# Patient Record
Sex: FEMALE | Race: WHITE | Employment: UNEMPLOYED | URBAN - METROPOLITAN AREA
[De-identification: names, ages, dates, MRNs, and addresses within clinical notes are randomized per-mention and may not be internally consistent; named-entity substitution may affect disease eponyms.]

---

## 2018-07-19 ENCOUNTER — HOSPITAL ENCOUNTER (OUTPATIENT)
Dept: MRI IMAGING | Age: 58
Discharge: HOME | End: 2018-07-19
Payer: COMMERCIAL

## 2018-07-19 DIAGNOSIS — M53.3 PAIN IN THE COCCYX: ICD-10-CM

## 2018-07-19 DIAGNOSIS — M54.42 ACUTE BACK PAIN WITH SCIATICA, LEFT: ICD-10-CM

## 2018-07-19 PROCEDURE — 72148 MRI LUMBAR SPINE W/O DYE: CPT

## 2018-07-23 ENCOUNTER — HOSPITAL ENCOUNTER (OUTPATIENT)
Dept: NUCLEAR MEDICINE | Age: 58
Discharge: HOME | End: 2018-07-23
Attending: INTERNAL MEDICINE
Payer: COMMERCIAL

## 2018-07-23 DIAGNOSIS — C79.51 METASTASIS TO BONE (HCC): ICD-10-CM

## 2018-07-23 DIAGNOSIS — Z85.3 HX: BREAST CANCER: ICD-10-CM

## 2018-07-23 PROCEDURE — A9503 TC99M MEDRONATE: HCPCS

## 2018-08-06 ENCOUNTER — APPOINTMENT (OUTPATIENT)
Dept: GENERAL RADIOLOGY | Age: 58
End: 2018-08-06
Attending: EMERGENCY MEDICINE
Payer: COMMERCIAL

## 2018-08-06 ENCOUNTER — ANESTHESIA EVENT (OUTPATIENT)
Dept: CT IMAGING | Age: 58
End: 2018-08-06
Payer: COMMERCIAL

## 2018-08-06 ENCOUNTER — APPOINTMENT (OUTPATIENT)
Dept: CT IMAGING | Age: 58
End: 2018-08-06
Attending: EMERGENCY MEDICINE
Payer: COMMERCIAL

## 2018-08-06 ENCOUNTER — HOSPITAL ENCOUNTER (EMERGENCY)
Age: 58
Discharge: HOME | End: 2018-08-06
Attending: EMERGENCY MEDICINE
Payer: COMMERCIAL

## 2018-08-06 ENCOUNTER — ANESTHESIA (OUTPATIENT)
Dept: CT IMAGING | Age: 58
End: 2018-08-06
Payer: COMMERCIAL

## 2018-08-06 ENCOUNTER — HOSPITAL ENCOUNTER (OUTPATIENT)
Dept: CT IMAGING | Age: 58
Discharge: HOME | End: 2018-08-06
Attending: INTERNAL MEDICINE
Payer: COMMERCIAL

## 2018-08-06 VITALS
RESPIRATION RATE: 11 BRPM | OXYGEN SATURATION: 100 % | HEIGHT: 63 IN | HEART RATE: 65 BPM | DIASTOLIC BLOOD PRESSURE: 69 MMHG | TEMPERATURE: 98.4 F | SYSTOLIC BLOOD PRESSURE: 124 MMHG | WEIGHT: 145 LBS | BODY MASS INDEX: 25.69 KG/M2

## 2018-08-06 VITALS
SYSTOLIC BLOOD PRESSURE: 125 MMHG | WEIGHT: 140 LBS | TEMPERATURE: 98 F | HEART RATE: 74 BPM | RESPIRATION RATE: 16 BRPM | OXYGEN SATURATION: 100 % | BODY MASS INDEX: 24.8 KG/M2 | DIASTOLIC BLOOD PRESSURE: 70 MMHG | HEIGHT: 63 IN

## 2018-08-06 DIAGNOSIS — M79.652 ACUTE THIGH PAIN, LEFT: ICD-10-CM

## 2018-08-06 DIAGNOSIS — R10.2 PELVIC PAIN: Primary | ICD-10-CM

## 2018-08-06 DIAGNOSIS — Z85.3 PERSONAL HISTORY OF MALIGNANT NEOPLASM OF BREAST: ICD-10-CM

## 2018-08-06 LAB
ALBUMIN SERPL-MCNC: 3.7 G/DL (ref 3.5–5)
ALBUMIN/GLOB SERPL: 1.1 {RATIO} (ref 1.1–2.2)
ALP SERPL-CCNC: 106 U/L (ref 45–117)
ALT SERPL-CCNC: 32 U/L (ref 12–78)
ANION GAP SERPL CALC-SCNC: 14 MMOL/L (ref 5–15)
AST SERPL-CCNC: 52 U/L (ref 15–37)
BASOPHILS # BLD: 0.1 K/UL (ref 0–0.1)
BASOPHILS NFR BLD: 1 % (ref 0–1)
BILIRUB SERPL-MCNC: 0.4 MG/DL (ref 0.2–1)
BUN SERPL-MCNC: 18 MG/DL (ref 6–20)
BUN/CREAT SERPL: 18 (ref 12–20)
CALCIUM SERPL-MCNC: 8.7 MG/DL (ref 8.5–10.1)
CHLORIDE SERPL-SCNC: 104 MMOL/L (ref 97–108)
CO2 SERPL-SCNC: 21 MMOL/L (ref 21–32)
CREAT SERPL-MCNC: 0.99 MG/DL (ref 0.55–1.02)
DIFFERENTIAL METHOD BLD: ABNORMAL
EOSINOPHIL # BLD: 0 K/UL (ref 0–0.4)
EOSINOPHIL NFR BLD: 0 % (ref 0–7)
ERYTHROCYTE [DISTWIDTH] IN BLOOD BY AUTOMATED COUNT: 13 % (ref 11.5–14.5)
GLOBULIN SER CALC-MCNC: 3.3 G/DL (ref 2–4)
GLUCOSE SERPL-MCNC: 154 MG/DL (ref 65–100)
HCT VFR BLD AUTO: 40.8 % (ref 35–47)
HGB BLD-MCNC: 13 G/DL (ref 11.5–16)
IMM GRANULOCYTES # BLD: 0.1 K/UL (ref 0–0.04)
IMM GRANULOCYTES NFR BLD AUTO: 1 % (ref 0–0.5)
LYMPHOCYTES # BLD: 1.7 K/UL (ref 0.8–3.5)
LYMPHOCYTES NFR BLD: 14 % (ref 12–49)
MCH RBC QN AUTO: 29.5 PG (ref 26–34)
MCHC RBC AUTO-ENTMCNC: 31.9 G/DL (ref 30–36.5)
MCV RBC AUTO: 92.5 FL (ref 80–99)
MONOCYTES # BLD: 0.8 K/UL (ref 0–1)
MONOCYTES NFR BLD: 7 % (ref 5–13)
NEUTS SEG # BLD: 9.4 K/UL (ref 1.8–8)
NEUTS SEG NFR BLD: 77 % (ref 32–75)
NRBC # BLD: 0 K/UL (ref 0–0.01)
NRBC BLD-RTO: 0 PER 100 WBC
PLATELET # BLD AUTO: 262 K/UL (ref 150–400)
PMV BLD AUTO: 10.1 FL (ref 8.9–12.9)
POTASSIUM SERPL-SCNC: 4 MMOL/L (ref 3.5–5.1)
PROT SERPL-MCNC: 7 G/DL (ref 6.4–8.2)
RBC # BLD AUTO: 4.41 M/UL (ref 3.8–5.2)
RBC MORPH BLD: ABNORMAL
SODIUM SERPL-SCNC: 139 MMOL/L (ref 136–145)
WBC # BLD AUTO: 12.1 K/UL (ref 3.6–11)

## 2018-08-06 PROCEDURE — 74011250636 HC RX REV CODE- 250/636: Performed by: STUDENT IN AN ORGANIZED HEALTH CARE EDUCATION/TRAINING PROGRAM

## 2018-08-06 PROCEDURE — 88311 DECALCIFY TISSUE: CPT | Performed by: INTERNAL MEDICINE

## 2018-08-06 PROCEDURE — 77030003503 HC NDL BIOP TISS BD -B

## 2018-08-06 PROCEDURE — 88342 IMHCHEM/IMCYTCHM 1ST ANTB: CPT | Performed by: INTERNAL MEDICINE

## 2018-08-06 PROCEDURE — 76060000032 HC ANESTHESIA 0.5 TO 1 HR

## 2018-08-06 PROCEDURE — 73502 X-RAY EXAM HIP UNI 2-3 VIEWS: CPT

## 2018-08-06 PROCEDURE — 36415 COLL VENOUS BLD VENIPUNCTURE: CPT | Performed by: EMERGENCY MEDICINE

## 2018-08-06 PROCEDURE — 88173 CYTOPATH EVAL FNA REPORT: CPT | Performed by: INTERNAL MEDICINE

## 2018-08-06 PROCEDURE — 88305 TISSUE EXAM BY PATHOLOGIST: CPT | Performed by: INTERNAL MEDICINE

## 2018-08-06 PROCEDURE — 77030028872 HC BN BIOP NDL ON CNTRL TY TELE -C

## 2018-08-06 PROCEDURE — 72192 CT PELVIS W/O DYE: CPT

## 2018-08-06 PROCEDURE — 96372 THER/PROPH/DIAG INJ SC/IM: CPT

## 2018-08-06 PROCEDURE — 88341 IMHCHEM/IMCYTCHM EA ADD ANTB: CPT | Performed by: INTERNAL MEDICINE

## 2018-08-06 PROCEDURE — 74011250637 HC RX REV CODE- 250/637: Performed by: EMERGENCY MEDICINE

## 2018-08-06 PROCEDURE — 85025 COMPLETE CBC W/AUTO DIFF WBC: CPT | Performed by: EMERGENCY MEDICINE

## 2018-08-06 PROCEDURE — 20220 BONE BIOPSY TROCAR/NDL SUPFC: CPT

## 2018-08-06 PROCEDURE — 74011250636 HC RX REV CODE- 250/636

## 2018-08-06 PROCEDURE — 88360 TUMOR IMMUNOHISTOCHEM/MANUAL: CPT | Performed by: INTERNAL MEDICINE

## 2018-08-06 PROCEDURE — 80053 COMPREHEN METABOLIC PANEL: CPT | Performed by: EMERGENCY MEDICINE

## 2018-08-06 PROCEDURE — 99284 EMERGENCY DEPT VISIT MOD MDM: CPT

## 2018-08-06 PROCEDURE — 88307 TISSUE EXAM BY PATHOLOGIST: CPT | Performed by: INTERNAL MEDICINE

## 2018-08-06 PROCEDURE — 74011250636 HC RX REV CODE- 250/636: Performed by: EMERGENCY MEDICINE

## 2018-08-06 PROCEDURE — 74011000250 HC RX REV CODE- 250

## 2018-08-06 RX ORDER — KETOROLAC TROMETHAMINE 30 MG/ML
30 INJECTION, SOLUTION INTRAMUSCULAR; INTRAVENOUS
Status: COMPLETED | OUTPATIENT
Start: 2018-08-06 | End: 2018-08-06

## 2018-08-06 RX ORDER — CHROMIUM PICOLINATE 200 MCG
TABLET ORAL
COMMUNITY
End: 2019-10-24

## 2018-08-06 RX ORDER — ONDANSETRON 4 MG/1
4 TABLET, ORALLY DISINTEGRATING ORAL
Qty: 10 TAB | Refills: 0 | Status: SHIPPED | OUTPATIENT
Start: 2018-08-06

## 2018-08-06 RX ORDER — HYDROCODONE BITARTRATE AND ACETAMINOPHEN 7.5; 325 MG/1; MG/1
1 TABLET ORAL
Qty: 12 TAB | Refills: 0 | Status: SHIPPED | OUTPATIENT
Start: 2018-08-06 | End: 2019-10-24

## 2018-08-06 RX ORDER — ONDANSETRON 4 MG/1
4 TABLET, ORALLY DISINTEGRATING ORAL
Status: COMPLETED | OUTPATIENT
Start: 2018-08-06 | End: 2018-08-06

## 2018-08-06 RX ORDER — LIDOCAINE HYDROCHLORIDE 10 MG/ML
INJECTION, SOLUTION EPIDURAL; INFILTRATION; INTRACAUDAL; PERINEURAL
Status: COMPLETED
Start: 2018-08-06 | End: 2018-08-06

## 2018-08-06 RX ORDER — SODIUM CHLORIDE 0.9 % (FLUSH) 0.9 %
10 SYRINGE (ML) INJECTION
Status: DISPENSED | OUTPATIENT
Start: 2018-08-06 | End: 2018-08-06

## 2018-08-06 RX ORDER — LIDOCAINE HYDROCHLORIDE 20 MG/ML
INJECTION, SOLUTION EPIDURAL; INFILTRATION; INTRACAUDAL; PERINEURAL AS NEEDED
Status: DISCONTINUED | OUTPATIENT
Start: 2018-08-06 | End: 2018-08-06 | Stop reason: HOSPADM

## 2018-08-06 RX ORDER — KETOROLAC TROMETHAMINE 30 MG/ML
30 INJECTION, SOLUTION INTRAMUSCULAR; INTRAVENOUS AS NEEDED
Status: DISCONTINUED | OUTPATIENT
Start: 2018-08-06 | End: 2018-08-10 | Stop reason: HOSPADM

## 2018-08-06 RX ORDER — LEVOTHYROXINE SODIUM 88 UG/1
88 TABLET ORAL
COMMUNITY
End: 2022-01-01 | Stop reason: ALTCHOICE

## 2018-08-06 RX ORDER — ONDANSETRON 2 MG/ML
INJECTION INTRAMUSCULAR; INTRAVENOUS AS NEEDED
Status: DISCONTINUED | OUTPATIENT
Start: 2018-08-06 | End: 2018-08-06 | Stop reason: HOSPADM

## 2018-08-06 RX ORDER — MIDAZOLAM HYDROCHLORIDE 1 MG/ML
INJECTION, SOLUTION INTRAMUSCULAR; INTRAVENOUS AS NEEDED
Status: DISCONTINUED | OUTPATIENT
Start: 2018-08-06 | End: 2018-08-06 | Stop reason: HOSPADM

## 2018-08-06 RX ORDER — DEXMEDETOMIDINE HYDROCHLORIDE 4 UG/ML
INJECTION, SOLUTION INTRAVENOUS AS NEEDED
Status: DISCONTINUED | OUTPATIENT
Start: 2018-08-06 | End: 2018-08-06 | Stop reason: HOSPADM

## 2018-08-06 RX ORDER — CYCLOBENZAPRINE HCL 10 MG
10 TABLET ORAL
Status: COMPLETED | OUTPATIENT
Start: 2018-08-06 | End: 2018-08-06

## 2018-08-06 RX ORDER — RANITIDINE 150 MG/1
150 TABLET, FILM COATED ORAL DAILY
COMMUNITY
End: 2019-10-24

## 2018-08-06 RX ORDER — KETOROLAC TROMETHAMINE 30 MG/ML
30 INJECTION, SOLUTION INTRAMUSCULAR; INTRAVENOUS
Status: DISCONTINUED | OUTPATIENT
Start: 2018-08-06 | End: 2018-08-06

## 2018-08-06 RX ORDER — HYDROCODONE BITARTRATE AND ACETAMINOPHEN 7.5; 325 MG/1; MG/1
1 TABLET ORAL
Status: COMPLETED | OUTPATIENT
Start: 2018-08-06 | End: 2018-08-06

## 2018-08-06 RX ORDER — SODIUM CHLORIDE 9 MG/ML
INJECTION, SOLUTION INTRAVENOUS
Status: DISCONTINUED | OUTPATIENT
Start: 2018-08-06 | End: 2018-08-06 | Stop reason: HOSPADM

## 2018-08-06 RX ORDER — IBUPROFEN 200 MG
600 TABLET ORAL
COMMUNITY
End: 2019-10-24

## 2018-08-06 RX ORDER — HYDROCHLOROTHIAZIDE 25 MG/1
25 TABLET ORAL DAILY
COMMUNITY
End: 2022-01-01 | Stop reason: ALTCHOICE

## 2018-08-06 RX ORDER — PROPOFOL 10 MG/ML
INJECTION, EMULSION INTRAVENOUS
Status: DISCONTINUED | OUTPATIENT
Start: 2018-08-06 | End: 2018-08-06 | Stop reason: HOSPADM

## 2018-08-06 RX ORDER — CYCLOBENZAPRINE HCL 10 MG
10 TABLET ORAL
Qty: 10 TAB | Refills: 0 | Status: SHIPPED | OUTPATIENT
Start: 2018-08-06 | End: 2019-10-24

## 2018-08-06 RX ORDER — PROPOFOL 10 MG/ML
INJECTION, EMULSION INTRAVENOUS AS NEEDED
Status: DISCONTINUED | OUTPATIENT
Start: 2018-08-06 | End: 2018-08-06 | Stop reason: HOSPADM

## 2018-08-06 RX ORDER — SODIUM CHLORIDE 9 MG/ML
25 INJECTION, SOLUTION INTRAVENOUS ONCE
Status: COMPLETED | OUTPATIENT
Start: 2018-08-06 | End: 2018-08-06

## 2018-08-06 RX ADMIN — ONDANSETRON 4 MG: 2 INJECTION INTRAMUSCULAR; INTRAVENOUS at 12:47

## 2018-08-06 RX ADMIN — PROPOFOL 20 MG: 10 INJECTION, EMULSION INTRAVENOUS at 12:14

## 2018-08-06 RX ADMIN — PROPOFOL 75 MCG/KG/MIN: 10 INJECTION, EMULSION INTRAVENOUS at 12:14

## 2018-08-06 RX ADMIN — HYDROCODONE BITARTRATE AND ACETAMINOPHEN 1 TABLET: 7.5; 325 TABLET ORAL at 21:39

## 2018-08-06 RX ADMIN — ONDANSETRON 4 MG: 4 TABLET, ORALLY DISINTEGRATING ORAL at 21:39

## 2018-08-06 RX ADMIN — KETOROLAC TROMETHAMINE 30 MG: 30 INJECTION, SOLUTION INTRAMUSCULAR at 13:25

## 2018-08-06 RX ADMIN — SODIUM CHLORIDE 25 ML/HR: 900 INJECTION, SOLUTION INTRAVENOUS at 10:56

## 2018-08-06 RX ADMIN — LIDOCAINE HYDROCHLORIDE 50 MG: 20 INJECTION, SOLUTION EPIDURAL; INFILTRATION; INTRACAUDAL; PERINEURAL at 12:14

## 2018-08-06 RX ADMIN — DEXMEDETOMIDINE HYDROCHLORIDE 4 MCG: 4 INJECTION, SOLUTION INTRAVENOUS at 12:25

## 2018-08-06 RX ADMIN — KETOROLAC TROMETHAMINE 30 MG: 30 INJECTION, SOLUTION INTRAMUSCULAR at 19:36

## 2018-08-06 RX ADMIN — PROPOFOL 30 MG: 10 INJECTION, EMULSION INTRAVENOUS at 12:25

## 2018-08-06 RX ADMIN — SODIUM CHLORIDE: 9 INJECTION, SOLUTION INTRAVENOUS at 12:01

## 2018-08-06 RX ADMIN — CYCLOBENZAPRINE HYDROCHLORIDE 10 MG: 10 TABLET, FILM COATED ORAL at 19:39

## 2018-08-06 RX ADMIN — DEXMEDETOMIDINE HYDROCHLORIDE 4 MCG: 4 INJECTION, SOLUTION INTRAVENOUS at 12:29

## 2018-08-06 RX ADMIN — LIDOCAINE HYDROCHLORIDE 10 ML: 10 INJECTION, SOLUTION EPIDURAL; INFILTRATION; INTRACAUDAL; PERINEURAL at 12:33

## 2018-08-06 RX ADMIN — DEXMEDETOMIDINE HYDROCHLORIDE 4 MCG: 4 INJECTION, SOLUTION INTRAVENOUS at 12:33

## 2018-08-06 RX ADMIN — MIDAZOLAM HYDROCHLORIDE 2 MG: 1 INJECTION, SOLUTION INTRAMUSCULAR; INTRAVENOUS at 12:12

## 2018-08-06 RX ADMIN — DEXMEDETOMIDINE HYDROCHLORIDE 4 MCG: 4 INJECTION, SOLUTION INTRAVENOUS at 12:20

## 2018-08-06 NOTE — ED PROVIDER NOTES
HPI Comments: 66 y.o. female with past medical history significant for Breast Cancer and Hypertension who presents via EMS with chief complaint of hip pain. Patient underwent bone biopsy this morning at [facility] IR department, and upon awaking from surgery onset of left hip pain. Patient reports constant 8/10 left hip pain that radiates down to the posterior knee described as \"muscular cramping. \" Patient reports aggravation of left hip pain with positional movement and weight bearing. Per medical records, patient's procedure was CT guided left sacral bx with no complications. Patient denies recent injury to the area or GLF. Patient reports previous bilateral mastectomy \"awhile ago. \" Pt denies fever, chills, cough, congestion, shortness of breath, chest pain, abdominal pain, nausea, vomiting, diarrhea, difficulty with urination or dysuria. There are no other acute medical concerns at this time. PCP: Dinora Romeo NP    Note written by Pam Ceja, as dictated by Fabienne Suggs MD 4:55 PM      The history is provided by the patient and the spouse. Past Medical History:   Diagnosis Date    Cancer ([address]     breast, mets to bone    Hypertension        Past Surgical History:   Procedure Laterality Date    HX MASTECTOMY Bilateral          History reviewed. No pertinent family history. Social History     Social History    Marital status:      Spouse name: N/A    Number of children: N/A    Years of education: N/A     Occupational History    Not on file. Social History Main Topics    Smoking status: Never Smoker    Smokeless tobacco: Never Used    Alcohol use Yes      Comment: ocasionally    Drug use: Not on file    Sexual activity: Not on file     Other Topics Concern    Not on file     Social History Narrative         ALLERGIES: Codeine and Erythromycin    Review of Systems   Constitutional: Negative for appetite change, chills and fever.    HENT: Negative for congestion, rhinorrhea, sore throat and trouble swallowing. Eyes: Negative for photophobia. Respiratory: Negative for cough and shortness of breath. Cardiovascular: Negative for chest pain and palpitations. Gastrointestinal: Negative for abdominal pain, diarrhea, nausea and vomiting. Genitourinary: Negative for difficulty urinating, dysuria, frequency and hematuria. Musculoskeletal: Negative for arthralgias. Hip pain   Neurological: Negative for dizziness, syncope and weakness. Psychiatric/Behavioral: Negative for behavioral problems. The patient is not nervous/anxious. All other systems reviewed and are negative. Vitals:    08/06/18 1632   BP: 130/72   Pulse: 81   Resp: 16   Temp: 98.2 °F (36.8 °C)   SpO2: 100%   Weight: 63.5 kg (140 lb)   Height: 5' 3\" (1.6 m)            Physical Exam   Constitutional: She appears well-developed and well-nourished. Laying in right lateral decubitus fetal position   HENT:   Head: Normocephalic and atraumatic. Mouth/Throat: Oropharynx is clear and moist.   Eyes: EOM are normal. Pupils are equal, round, and reactive to light. Neck: Normal range of motion. Neck supple. Cardiovascular: Normal rate, regular rhythm, normal heart sounds and intact distal pulses. Exam reveals no gallop and no friction rub. No murmur heard. Pulmonary/Chest: Effort normal. No respiratory distress. She has no wheezes. She has no rales. Abdominal: Soft. There is no tenderness. There is no rebound. Musculoskeletal: Normal range of motion. She exhibits no tenderness. Neurological: She is alert. No cranial nerve deficit. Motor; symmetric   Skin: No erythema. Bandaid over left posterior pelvis adjacent to sacrum. Psychiatric: She has a normal mood and affect. Her behavior is normal.   Nursing note and vitals reviewed.   Note written by Pam Gordon, as dictated by Sarah Juares MD 5:07 PM      [facility]      ED Course       Procedures  PROGRESS NOTE:  5:29 PM Patient had Toradol and Ibuprofen which provided slight relief; however, patient does not like to take Tylenol because it \"does not work\" and does not like taking opiates because it makes her vomit. Patient does not want pain medication at this time. CONSULT NOTE:  8:26 PM Alexey Smith MD spoke with Dr. Andrey Yadav for Interventional Radiology. Discussed available diagnostic tests and clinical findings. Dr. Kingsley Kamara recommends to get CT and admit for possible radiation treatment and OT/PT. PROGRESS NOTE:  8:29 PM Provider with patient at bedside reviewing care plan with patient. Patient expressed understanding and agrees with care plan.

## 2018-08-06 NOTE — ED TRIAGE NOTES
Triage note: EMS states pt had a bone biopsy this morning and taken from the left hip/pevis region and this afternoon she got up from the couch to walk and could not walk due to the pain. Rates pain a 7/10. States she cannot put any pressure on the left leg.

## 2018-08-06 NOTE — ED NOTES
Bedside and Verbal shift change report given to [facility] (oncoming nurse) by Mango Edwards (offgoing nurse). Report included the following information SBAR, ED Summary, Intake/Output, MAR, Recent Results.

## 2018-08-06 NOTE — DISCHARGE INSTRUCTIONS
BONE  BIOPSY/ASPIRATION DISCHARGE INSTRUCTIONS  A bone marrow aspiration is a procedure that takes out a small amount of bone marrow fluid through a needle. A bone marrow biopsy uses a needle to take out a small amount of bone with the marrow inside it. These samples are then checked under a microscope. The hip bone is the most commonly used area for these procedures. Home Care Instructions: You may resume your regular diet and medication regimen. Do not drink alcohol, drive, or make any important legal decisions in the next 24 hours. Do not lift anything heavier than a gallon of milk until the soreness goes away. You may use over the counter acetaminophen or ibuprofen for the soreness. You may apply an ice pack to the affected area for 20-30 minutes at time for the first 24 hours. After that, you may apply a heat pack. You will have a bandaid over the area where the doctor put the needle. Keep this area clean and dry for the next 24 hours. Change the bandaid daily, or if it becomes wet, until the area has healed. Call if you have any bleeding other than a small spot on your bandaid. Call if you have any signs or symptoms of infection: fever, redness, or drainage from the puncture site or fever. Should you experience any significant changes, please call [phone] between the hours of 7:30 am and 10 pm or [phone] after hours.  After hours, ask the  to page the [address] [place] [address] Technologist, and describe the problem to the technologist.

## 2018-08-06 NOTE — ED NOTES
Attempted to ambulate patient with walker. Pt able to take 2-3 steps before yelling at this RN, \"It hurts, I can't do it. \" Pt assisted back into bed with 2 person assist. Pt agreeable to try pain medication. Dr. Karyle Hailey notified. 7:25 PM  Dr. Karyle Hailey at bedside.

## 2018-08-06 NOTE — ANESTHESIA PREPROCEDURE EVALUATION
Anesthetic History   No history of anesthetic complications            Review of Systems / Medical History  Patient summary reviewed, nursing notes reviewed and pertinent labs reviewed    Pulmonary  Within defined limits                 Neuro/Psych   Within defined limits           Cardiovascular  Within defined limits                     GI/Hepatic/Renal  Within defined limits              Endo/Other        Cancer     Other Findings            Physical Exam    Airway  Mallampati: II  TM Distance: > 6 cm  Neck ROM: normal range of motion   Mouth opening: Normal     Cardiovascular  Regular rate and rhythm,  S1 and S2 normal,  no murmur, click, rub, or gallop             Dental  No notable dental hx       Pulmonary  Breath sounds clear to auscultation               Abdominal  GI exam deferred       Other Findings            Anesthetic Plan    ASA: 2  Anesthesia type: MAC          Induction: Intravenous  Anesthetic plan and risks discussed with: Patient

## 2018-08-06 NOTE — ROUTINE PROCESS
Pt. Discharged to home and transported to d/c lot via w/c. Pt. And  verbalized understanding of d/c instructions.

## 2018-08-06 NOTE — PROGRESS NOTES
Received into main CT for CT guided left sacral bx. Anesthesia to assist with sedation and vital sign management d.t.  Poor reaction of nausea and vomiting \" for several days\" in the past

## 2018-08-06 NOTE — ANESTHESIA POSTPROCEDURE EVALUATION
Post-Anesthesia Evaluation and Assessment    Patient: Joel Wynn MRN: [id number]  SSN: [id number]    YOB: 1960  Age: 66 y.o. Sex: female       Cardiovascular Function/Vital Signs  Visit Vitals    /57 (BP 1 Location: Right arm, BP Patient Position: At rest)    Pulse (!) 58    Temp 36.9 °C (98.4 °F)    Resp 11    Ht 5' 3\" (1.6 m)    Wt 65.8 kg (145 lb)    SpO2 100%    BMI 25.69 kg/m2       Patient is status post MAC anesthesia for * No procedures listed *. Nausea/Vomiting: None    Postoperative hydration reviewed and adequate. Pain:  Pain Scale 1: Numeric (0 - 10) (08/06/18 1011)  Pain Intensity 1: 7 (08/06/18 1345)   Managed    Neurological Status: At baseline    Mental Status and Level of Consciousness: Arousable    Pulmonary Status:   O2 Device: Room air (08/06/18 1345)   Adequate oxygenation and airway patent    Complications related to anesthesia: None    Post-anesthesia assessment completed.  No concerns    Signed By: Halle Manley MD     August 6, 2018

## 2018-09-20 ENCOUNTER — HOSPITAL ENCOUNTER (OUTPATIENT)
Dept: NON INVASIVE DIAGNOSTICS | Age: 58
Discharge: HOME | End: 2018-09-20
Payer: COMMERCIAL

## 2018-09-20 DIAGNOSIS — Z85.3 HX: BREAST CANCER: ICD-10-CM

## 2018-09-20 PROCEDURE — 93005 ELECTROCARDIOGRAM TRACING: CPT

## 2018-09-21 LAB
ATRIAL RATE: 84 BPM
CALCULATED P AXIS, ECG09: 66 DEGREES
CALCULATED R AXIS, ECG10: 18 DEGREES
CALCULATED T AXIS, ECG11: 38 DEGREES
DIAGNOSIS, 93000: NORMAL
P-R INTERVAL, ECG05: 206 MS
Q-T INTERVAL, ECG07: 370 MS
QRS DURATION, ECG06: 82 MS
QTC CALCULATION (BEZET), ECG08: 437 MS
VENTRICULAR RATE, ECG03: 84 BPM

## 2019-05-22 ENCOUNTER — HOSPITAL ENCOUNTER (OUTPATIENT)
Dept: MRI IMAGING | Age: 59
Discharge: HOME | End: 2019-05-22
Attending: INTERNAL MEDICINE
Payer: COMMERCIAL

## 2019-05-22 VITALS — BODY MASS INDEX: 26.57 KG/M2 | WEIGHT: 150 LBS

## 2019-05-22 DIAGNOSIS — C50.612 MALIGNANT NEOPLASM OF AXILLARY TAIL OF LEFT FEMALE BREAST (HCC): ICD-10-CM

## 2019-05-22 DIAGNOSIS — G44.209 TENSION HEADACHE: ICD-10-CM

## 2019-05-22 PROCEDURE — 74011250636 HC RX REV CODE- 250/636: Performed by: INTERNAL MEDICINE

## 2019-05-22 PROCEDURE — A9575 INJ GADOTERATE MEGLUMI 0.1ML: HCPCS | Performed by: INTERNAL MEDICINE

## 2019-05-22 PROCEDURE — 70553 MRI BRAIN STEM W/O & W/DYE: CPT

## 2019-05-22 RX ORDER — GADOTERATE MEGLUMINE 376.9 MG/ML
13 INJECTION INTRAVENOUS
Status: COMPLETED | OUTPATIENT
Start: 2019-05-22 | End: 2019-05-22

## 2019-05-22 RX ADMIN — GADOTERATE MEGLUMINE 13 ML: 376.9 INJECTION INTRAVENOUS at 10:14

## 2019-10-24 ENCOUNTER — APPOINTMENT (OUTPATIENT)
Dept: CT IMAGING | Age: 59
DRG: 392 | End: 2019-10-24
Attending: EMERGENCY MEDICINE
Payer: COMMERCIAL

## 2019-10-24 ENCOUNTER — HOSPITAL ENCOUNTER (INPATIENT)
Age: 59
LOS: 4 days | Discharge: HOME | DRG: 392 | End: 2019-10-28
Attending: EMERGENCY MEDICINE | Admitting: INTERNAL MEDICINE
Payer: COMMERCIAL

## 2019-10-24 DIAGNOSIS — E86.0 DEHYDRATION: ICD-10-CM

## 2019-10-24 DIAGNOSIS — E87.6 HYPOKALEMIA: ICD-10-CM

## 2019-10-24 DIAGNOSIS — K52.9 NONINFECTIOUS GASTROENTERITIS, UNSPECIFIED TYPE: ICD-10-CM

## 2019-10-24 DIAGNOSIS — R11.2 NAUSEA AND VOMITING, INTRACTABILITY OF VOMITING NOT SPECIFIED, UNSPECIFIED VOMITING TYPE: ICD-10-CM

## 2019-10-24 DIAGNOSIS — R19.7 BLOODY DIARRHEA: Primary | ICD-10-CM

## 2019-10-24 LAB
ALBUMIN SERPL-MCNC: 4.2 G/DL (ref 3.5–5)
ALBUMIN/GLOB SERPL: 1.2 {RATIO} (ref 1.1–2.2)
ALP SERPL-CCNC: 65 U/L (ref 45–117)
ALT SERPL-CCNC: 30 U/L (ref 12–78)
ANION GAP SERPL CALC-SCNC: 12 MMOL/L (ref 5–15)
APPEARANCE UR: CLEAR
AST SERPL-CCNC: 22 U/L (ref 15–37)
BACTERIA URNS QL MICRO: NEGATIVE /HPF
BASOPHILS # BLD: 0.1 K/UL (ref 0–0.1)
BASOPHILS NFR BLD: 1 % (ref 0–1)
BILIRUB SERPL-MCNC: 0.5 MG/DL (ref 0.2–1)
BILIRUB UR QL: NEGATIVE
BUN SERPL-MCNC: 19 MG/DL (ref 6–20)
BUN/CREAT SERPL: 16 (ref 12–20)
CALCIUM SERPL-MCNC: 9.7 MG/DL (ref 8.5–10.1)
CHLORIDE SERPL-SCNC: 94 MMOL/L (ref 97–108)
CO2 SERPL-SCNC: 28 MMOL/L (ref 21–32)
COLOR UR: ABNORMAL
CREAT SERPL-MCNC: 1.21 MG/DL (ref 0.55–1.02)
DIFFERENTIAL METHOD BLD: ABNORMAL
EOSINOPHIL # BLD: 0 K/UL (ref 0–0.4)
EOSINOPHIL NFR BLD: 0 % (ref 0–7)
EPITH CASTS URNS QL MICRO: ABNORMAL /LPF
ERYTHROCYTE [DISTWIDTH] IN BLOOD BY AUTOMATED COUNT: 14.3 % (ref 11.5–14.5)
GLOBULIN SER CALC-MCNC: 3.5 G/DL (ref 2–4)
GLUCOSE SERPL-MCNC: 182 MG/DL (ref 65–100)
GLUCOSE UR STRIP.AUTO-MCNC: NEGATIVE MG/DL
HCT VFR BLD AUTO: 43.1 % (ref 35–47)
HEMOCCULT STL QL: POSITIVE
HGB BLD-MCNC: 14 G/DL (ref 11.5–16)
HGB UR QL STRIP: NEGATIVE
IMM GRANULOCYTES # BLD AUTO: 0.1 K/UL (ref 0–0.04)
IMM GRANULOCYTES NFR BLD AUTO: 1 % (ref 0–0.5)
KETONES UR QL STRIP.AUTO: >80 MG/DL
LEUKOCYTE ESTERASE UR QL STRIP.AUTO: NEGATIVE
LIPASE SERPL-CCNC: 132 U/L (ref 73–393)
LYMPHOCYTES # BLD: 1.6 K/UL (ref 0.8–3.5)
LYMPHOCYTES NFR BLD: 8 % (ref 12–49)
MAGNESIUM SERPL-MCNC: 2.1 MG/DL (ref 1.6–2.4)
MCH RBC QN AUTO: 30.5 PG (ref 26–34)
MCHC RBC AUTO-ENTMCNC: 32.5 G/DL (ref 30–36.5)
MCV RBC AUTO: 93.9 FL (ref 80–99)
MONOCYTES # BLD: 0.6 K/UL (ref 0–1)
MONOCYTES NFR BLD: 3 % (ref 5–13)
NEUTS SEG # BLD: 16.1 K/UL (ref 1.8–8)
NEUTS SEG NFR BLD: 87 % (ref 32–75)
NITRITE UR QL STRIP.AUTO: NEGATIVE
NRBC # BLD: 0 K/UL (ref 0–0.01)
NRBC BLD-RTO: 0 PER 100 WBC
PH UR STRIP: 5 [PH] (ref 5–8)
PLATELET # BLD AUTO: 272 K/UL (ref 150–400)
PMV BLD AUTO: 9.4 FL (ref 8.9–12.9)
POTASSIUM SERPL-SCNC: 2.5 MMOL/L (ref 3.5–5.1)
PROT SERPL-MCNC: 7.7 G/DL (ref 6.4–8.2)
PROT UR STRIP-MCNC: NEGATIVE MG/DL
RBC # BLD AUTO: 4.59 M/UL (ref 3.8–5.2)
RBC #/AREA URNS HPF: ABNORMAL /HPF (ref 0–5)
SODIUM SERPL-SCNC: 134 MMOL/L (ref 136–145)
SP GR UR REFRACTOMETRY: 1.02 (ref 1–1.03)
UA: UC IF INDICATED,UAUC: ABNORMAL
UROBILINOGEN UR QL STRIP.AUTO: 0.2 EU/DL (ref 0.2–1)
WBC # BLD AUTO: 18.5 K/UL (ref 3.6–11)
WBC URNS QL MICRO: ABNORMAL /HPF (ref 0–4)
YEAST BUDDING URNS QL: PRESENT

## 2019-10-24 PROCEDURE — 74177 CT ABD & PELVIS W/CONTRAST: CPT

## 2019-10-24 PROCEDURE — 94640 AIRWAY INHALATION TREATMENT: CPT

## 2019-10-24 PROCEDURE — 83735 ASSAY OF MAGNESIUM: CPT

## 2019-10-24 PROCEDURE — 74011000258 HC RX REV CODE- 258: Performed by: EMERGENCY MEDICINE

## 2019-10-24 PROCEDURE — 99285 EMERGENCY DEPT VISIT HI MDM: CPT

## 2019-10-24 PROCEDURE — 93005 ELECTROCARDIOGRAM TRACING: CPT

## 2019-10-24 PROCEDURE — 74011000250 HC RX REV CODE- 250: Performed by: EMERGENCY MEDICINE

## 2019-10-24 PROCEDURE — 74011250636 HC RX REV CODE- 250/636: Performed by: EMERGENCY MEDICINE

## 2019-10-24 PROCEDURE — 81001 URINALYSIS AUTO W/SCOPE: CPT

## 2019-10-24 PROCEDURE — 82272 OCCULT BLD FECES 1-3 TESTS: CPT

## 2019-10-24 PROCEDURE — 74011636320 HC RX REV CODE- 636/320: Performed by: EMERGENCY MEDICINE

## 2019-10-24 PROCEDURE — 83690 ASSAY OF LIPASE: CPT

## 2019-10-24 PROCEDURE — 65660000000 HC RM CCU STEPDOWN

## 2019-10-24 PROCEDURE — 96375 TX/PRO/DX INJ NEW DRUG ADDON: CPT

## 2019-10-24 PROCEDURE — 80053 COMPREHEN METABOLIC PANEL: CPT

## 2019-10-24 PROCEDURE — 36415 COLL VENOUS BLD VENIPUNCTURE: CPT

## 2019-10-24 PROCEDURE — 96361 HYDRATE IV INFUSION ADD-ON: CPT

## 2019-10-24 PROCEDURE — 85025 COMPLETE CBC W/AUTO DIFF WBC: CPT

## 2019-10-24 PROCEDURE — 96365 THER/PROPH/DIAG IV INF INIT: CPT

## 2019-10-24 RX ORDER — POTASSIUM CHLORIDE 750 MG/1
30 TABLET, FILM COATED, EXTENDED RELEASE ORAL
Status: DISCONTINUED | OUTPATIENT
Start: 2019-10-24 | End: 2019-10-24

## 2019-10-24 RX ORDER — POTASSIUM CHLORIDE 14.9 MG/ML
10 INJECTION INTRAVENOUS
Status: COMPLETED | OUTPATIENT
Start: 2019-10-24 | End: 2019-10-25

## 2019-10-24 RX ORDER — PROCHLORPERAZINE MALEATE 5 MG
5 TABLET ORAL
Status: ON HOLD | COMMUNITY
End: 2019-10-28 | Stop reason: SDUPTHER

## 2019-10-24 RX ORDER — FAMOTIDINE 10 MG/1
20 TABLET ORAL DAILY
COMMUNITY
Start: 2022-01-01 | End: 2022-01-01 | Stop reason: ALTCHOICE

## 2019-10-24 RX ORDER — SODIUM CHLORIDE, SODIUM LACTATE, POTASSIUM CHLORIDE, CALCIUM CHLORIDE 600; 310; 30; 20 MG/100ML; MG/100ML; MG/100ML; MG/100ML
125 INJECTION, SOLUTION INTRAVENOUS CONTINUOUS
Status: DISCONTINUED | OUTPATIENT
Start: 2019-10-24 | End: 2019-10-25

## 2019-10-24 RX ORDER — SODIUM CHLORIDE 9 MG/ML
50 INJECTION, SOLUTION INTRAVENOUS
Status: COMPLETED | OUTPATIENT
Start: 2019-10-24 | End: 2019-10-25

## 2019-10-24 RX ORDER — LORATADINE 10 MG/1
10 TABLET ORAL DAILY
COMMUNITY
End: 2022-01-01 | Stop reason: ALTCHOICE

## 2019-10-24 RX ORDER — NALOXONE HYDROCHLORIDE 0.4 MG/ML
0.4 INJECTION, SOLUTION INTRAMUSCULAR; INTRAVENOUS; SUBCUTANEOUS AS NEEDED
Status: DISCONTINUED | OUTPATIENT
Start: 2019-10-24 | End: 2019-10-28 | Stop reason: HOSPADM

## 2019-10-24 RX ORDER — POTASSIUM CHLORIDE 14.9 MG/ML
10 INJECTION INTRAVENOUS
Status: COMPLETED | OUTPATIENT
Start: 2019-10-24 | End: 2019-10-24

## 2019-10-24 RX ORDER — ACETAMINOPHEN 325 MG/1
650 TABLET ORAL
Status: DISCONTINUED | OUTPATIENT
Start: 2019-10-24 | End: 2019-10-28 | Stop reason: HOSPADM

## 2019-10-24 RX ORDER — SODIUM CHLORIDE 9 MG/ML
10 INJECTION INTRAMUSCULAR; INTRAVENOUS; SUBCUTANEOUS ONCE
Status: COMPLETED | OUTPATIENT
Start: 2019-10-24 | End: 2019-10-24

## 2019-10-24 RX ORDER — SODIUM CHLORIDE 0.9 % (FLUSH) 0.9 %
5-40 SYRINGE (ML) INJECTION AS NEEDED
Status: DISCONTINUED | OUTPATIENT
Start: 2019-10-24 | End: 2019-10-28 | Stop reason: HOSPADM

## 2019-10-24 RX ORDER — METFORMIN HYDROCHLORIDE 500 MG/1
500 TABLET ORAL DAILY
COMMUNITY
End: 2022-01-01 | Stop reason: ALTCHOICE

## 2019-10-24 RX ORDER — SODIUM CHLORIDE 0.9 % (FLUSH) 0.9 %
5-40 SYRINGE (ML) INJECTION EVERY 8 HOURS
Status: DISCONTINUED | OUTPATIENT
Start: 2019-10-24 | End: 2019-10-28 | Stop reason: HOSPADM

## 2019-10-24 RX ORDER — ONDANSETRON 2 MG/ML
4 INJECTION INTRAMUSCULAR; INTRAVENOUS
Status: COMPLETED | OUTPATIENT
Start: 2019-10-24 | End: 2019-10-24

## 2019-10-24 RX ORDER — MORPHINE SULFATE 2 MG/ML
2 INJECTION, SOLUTION INTRAMUSCULAR; INTRAVENOUS
Status: DISCONTINUED | OUTPATIENT
Start: 2019-10-24 | End: 2019-10-28 | Stop reason: HOSPADM

## 2019-10-24 RX ADMIN — SODIUM CHLORIDE 10 MG: 9 INJECTION INTRAMUSCULAR; INTRAVENOUS; SUBCUTANEOUS at 22:16

## 2019-10-24 RX ADMIN — SODIUM CHLORIDE 3.38 G: 900 INJECTION, SOLUTION INTRAVENOUS at 23:14

## 2019-10-24 RX ADMIN — SODIUM CHLORIDE 10 ML: 9 INJECTION INTRAMUSCULAR; INTRAVENOUS; SUBCUTANEOUS at 22:08

## 2019-10-24 RX ADMIN — IOPAMIDOL 100 ML: 755 INJECTION, SOLUTION INTRAVENOUS at 22:08

## 2019-10-24 RX ADMIN — SODIUM CHLORIDE 1000 ML: 900 INJECTION, SOLUTION INTRAVENOUS at 22:16

## 2019-10-24 RX ADMIN — SODIUM CHLORIDE 50 ML/HR: 900 INJECTION, SOLUTION INTRAVENOUS at 22:08

## 2019-10-24 RX ADMIN — ONDANSETRON 4 MG: 2 INJECTION INTRAMUSCULAR; INTRAVENOUS at 21:07

## 2019-10-24 RX ADMIN — POTASSIUM CHLORIDE 10 MEQ: 200 INJECTION, SOLUTION INTRAVENOUS at 22:23

## 2019-10-24 RX ADMIN — SODIUM CHLORIDE 1000 ML: 900 INJECTION, SOLUTION INTRAVENOUS at 21:07

## 2019-10-24 RX ADMIN — POTASSIUM CHLORIDE 10 MEQ: 200 INJECTION, SOLUTION INTRAVENOUS at 23:38

## 2019-10-25 ENCOUNTER — APPOINTMENT (OUTPATIENT)
Dept: GENERAL RADIOLOGY | Age: 59
DRG: 392 | End: 2019-10-25
Attending: INTERNAL MEDICINE
Payer: COMMERCIAL

## 2019-10-25 LAB
ALBUMIN SERPL-MCNC: 3.7 G/DL (ref 3.5–5)
ALBUMIN/GLOB SERPL: 1.1 {RATIO} (ref 1.1–2.2)
ALP SERPL-CCNC: 56 U/L (ref 45–117)
ALT SERPL-CCNC: 26 U/L (ref 12–78)
ANION GAP SERPL CALC-SCNC: 9 MMOL/L (ref 5–15)
APPEARANCE UR: CLEAR
AST SERPL-CCNC: 17 U/L (ref 15–37)
ATRIAL RATE: 101 BPM
ATRIAL RATE: 94 BPM
BACTERIA URNS QL MICRO: NEGATIVE /HPF
BASOPHILS # BLD: 0 K/UL (ref 0–0.1)
BASOPHILS NFR BLD: 0 % (ref 0–1)
BILIRUB SERPL-MCNC: 0.6 MG/DL (ref 0.2–1)
BILIRUB UR QL: NEGATIVE
BUN SERPL-MCNC: 10 MG/DL (ref 6–20)
BUN/CREAT SERPL: 10 (ref 12–20)
CALCIUM SERPL-MCNC: 8.7 MG/DL (ref 8.5–10.1)
CALCULATED P AXIS, ECG09: 60 DEGREES
CALCULATED P AXIS, ECG09: 67 DEGREES
CALCULATED R AXIS, ECG10: 1 DEGREES
CALCULATED R AXIS, ECG10: 21 DEGREES
CALCULATED T AXIS, ECG11: 24 DEGREES
CALCULATED T AXIS, ECG11: 48 DEGREES
CHLORIDE SERPL-SCNC: 99 MMOL/L (ref 97–108)
CHOLEST SERPL-MCNC: 259 MG/DL
CO2 SERPL-SCNC: 28 MMOL/L (ref 21–32)
COLOR UR: ABNORMAL
CREAT SERPL-MCNC: 0.97 MG/DL (ref 0.55–1.02)
DIAGNOSIS, 93000: NORMAL
DIAGNOSIS, 93000: NORMAL
DIFFERENTIAL METHOD BLD: ABNORMAL
EOSINOPHIL # BLD: 0 K/UL (ref 0–0.4)
EOSINOPHIL NFR BLD: 0 % (ref 0–7)
EPITH CASTS URNS QL MICRO: ABNORMAL /LPF
ERYTHROCYTE [DISTWIDTH] IN BLOOD BY AUTOMATED COUNT: 14.3 % (ref 11.5–14.5)
EST. AVERAGE GLUCOSE BLD GHB EST-MCNC: 171 MG/DL
GLOBULIN SER CALC-MCNC: 3.4 G/DL (ref 2–4)
GLUCOSE BLD STRIP.AUTO-MCNC: 113 MG/DL (ref 65–100)
GLUCOSE BLD STRIP.AUTO-MCNC: 134 MG/DL (ref 65–100)
GLUCOSE BLD STRIP.AUTO-MCNC: 146 MG/DL (ref 65–100)
GLUCOSE BLD STRIP.AUTO-MCNC: 155 MG/DL (ref 65–100)
GLUCOSE SERPL-MCNC: 154 MG/DL (ref 65–100)
GLUCOSE UR STRIP.AUTO-MCNC: NEGATIVE MG/DL
HBA1C MFR BLD: 7.6 % (ref 4.2–6.3)
HCT VFR BLD AUTO: 41.9 % (ref 35–47)
HDLC SERPL-MCNC: 99 MG/DL
HDLC SERPL: 2.6 {RATIO} (ref 0–5)
HGB BLD-MCNC: 13.3 G/DL (ref 11.5–16)
HGB UR QL STRIP: ABNORMAL
HYALINE CASTS URNS QL MICRO: ABNORMAL /LPF (ref 0–5)
IMM GRANULOCYTES # BLD AUTO: 0.2 K/UL (ref 0–0.04)
IMM GRANULOCYTES NFR BLD AUTO: 1 % (ref 0–0.5)
KETONES UR QL STRIP.AUTO: 15 MG/DL
LDLC SERPL CALC-MCNC: 145.6 MG/DL (ref 0–100)
LEUKOCYTE ESTERASE UR QL STRIP.AUTO: NEGATIVE
LIPASE SERPL-CCNC: 99 U/L (ref 73–393)
LIPID PROFILE,FLP: ABNORMAL
LYMPHOCYTES # BLD: 0.8 K/UL (ref 0.8–3.5)
LYMPHOCYTES NFR BLD: 5 % (ref 12–49)
MAGNESIUM SERPL-MCNC: 2 MG/DL (ref 1.6–2.4)
MCH RBC QN AUTO: 30.4 PG (ref 26–34)
MCHC RBC AUTO-ENTMCNC: 31.7 G/DL (ref 30–36.5)
MCV RBC AUTO: 95.9 FL (ref 80–99)
MONOCYTES # BLD: 0.7 K/UL (ref 0–1)
MONOCYTES NFR BLD: 4 % (ref 5–13)
NEUTS SEG # BLD: 15.1 K/UL (ref 1.8–8)
NEUTS SEG NFR BLD: 90 % (ref 32–75)
NITRITE UR QL STRIP.AUTO: NEGATIVE
NRBC # BLD: 0 K/UL (ref 0–0.01)
NRBC BLD-RTO: 0 PER 100 WBC
P-R INTERVAL, ECG05: 174 MS
P-R INTERVAL, ECG05: 200 MS
PH UR STRIP: 5 [PH] (ref 5–8)
PHOSPHATE SERPL-MCNC: 3.2 MG/DL (ref 2.6–4.7)
PLATELET # BLD AUTO: 269 K/UL (ref 150–400)
PMV BLD AUTO: 10.1 FL (ref 8.9–12.9)
POTASSIUM SERPL-SCNC: 2.8 MMOL/L (ref 3.5–5.1)
PROT SERPL-MCNC: 7.1 G/DL (ref 6.4–8.2)
PROT UR STRIP-MCNC: NEGATIVE MG/DL
Q-T INTERVAL, ECG07: 364 MS
Q-T INTERVAL, ECG07: 376 MS
QRS DURATION, ECG06: 80 MS
QRS DURATION, ECG06: 84 MS
QTC CALCULATION (BEZET), ECG08: 455 MS
QTC CALCULATION (BEZET), ECG08: 487 MS
RBC # BLD AUTO: 4.37 M/UL (ref 3.8–5.2)
RBC #/AREA URNS HPF: ABNORMAL /HPF (ref 0–5)
RBC MORPH BLD: ABNORMAL
SERVICE CMNT-IMP: ABNORMAL
SODIUM SERPL-SCNC: 136 MMOL/L (ref 136–145)
SP GR UR REFRACTOMETRY: 1.02 (ref 1–1.03)
TRIGL SERPL-MCNC: 72 MG/DL (ref ?–150)
TSH SERPL DL<=0.05 MIU/L-ACNC: 2.05 UIU/ML (ref 0.36–3.74)
UROBILINOGEN UR QL STRIP.AUTO: 0.2 EU/DL (ref 0.2–1)
VENTRICULAR RATE, ECG03: 101 BPM
VENTRICULAR RATE, ECG03: 94 BPM
VLDLC SERPL CALC-MCNC: 14.4 MG/DL
WBC # BLD AUTO: 16.8 K/UL (ref 3.6–11)
WBC URNS QL MICRO: ABNORMAL /HPF (ref 0–4)

## 2019-10-25 PROCEDURE — 83690 ASSAY OF LIPASE: CPT

## 2019-10-25 PROCEDURE — 84100 ASSAY OF PHOSPHORUS: CPT

## 2019-10-25 PROCEDURE — 93005 ELECTROCARDIOGRAM TRACING: CPT

## 2019-10-25 PROCEDURE — 74011250637 HC RX REV CODE- 250/637: Performed by: INTERNAL MEDICINE

## 2019-10-25 PROCEDURE — 74011250636 HC RX REV CODE- 250/636: Performed by: INTERNAL MEDICINE

## 2019-10-25 PROCEDURE — 74011250636 HC RX REV CODE- 250/636: Performed by: HOSPITALIST

## 2019-10-25 PROCEDURE — 89055 LEUKOCYTE ASSESSMENT FECAL: CPT

## 2019-10-25 PROCEDURE — 80053 COMPREHEN METABOLIC PANEL: CPT

## 2019-10-25 PROCEDURE — 80061 LIPID PANEL: CPT

## 2019-10-25 PROCEDURE — 36415 COLL VENOUS BLD VENIPUNCTURE: CPT

## 2019-10-25 PROCEDURE — 85025 COMPLETE CBC W/AUTO DIFF WBC: CPT

## 2019-10-25 PROCEDURE — 82962 GLUCOSE BLOOD TEST: CPT

## 2019-10-25 PROCEDURE — 84443 ASSAY THYROID STIM HORMONE: CPT

## 2019-10-25 PROCEDURE — 87177 OVA AND PARASITES SMEARS: CPT

## 2019-10-25 PROCEDURE — 83036 HEMOGLOBIN GLYCOSYLATED A1C: CPT

## 2019-10-25 PROCEDURE — 0107U C DIFF TOX AG DETCJ IA STOOL: CPT

## 2019-10-25 PROCEDURE — 81001 URINALYSIS AUTO W/SCOPE: CPT

## 2019-10-25 PROCEDURE — 74011250636 HC RX REV CODE- 250/636

## 2019-10-25 PROCEDURE — 71045 X-RAY EXAM CHEST 1 VIEW: CPT

## 2019-10-25 PROCEDURE — 74011000250 HC RX REV CODE- 250: Performed by: HOSPITALIST

## 2019-10-25 PROCEDURE — 87506 IADNA-DNA/RNA PROBE TQ 6-11: CPT

## 2019-10-25 PROCEDURE — 83735 ASSAY OF MAGNESIUM: CPT

## 2019-10-25 PROCEDURE — 65660000000 HC RM CCU STEPDOWN

## 2019-10-25 RX ORDER — ONDANSETRON 2 MG/ML
4 INJECTION INTRAMUSCULAR; INTRAVENOUS
Status: DISCONTINUED | OUTPATIENT
Start: 2019-10-25 | End: 2019-10-28 | Stop reason: HOSPADM

## 2019-10-25 RX ORDER — LEVOFLOXACIN 5 MG/ML
750 INJECTION, SOLUTION INTRAVENOUS EVERY 24 HOURS
Status: DISCONTINUED | OUTPATIENT
Start: 2019-10-26 | End: 2019-10-28 | Stop reason: HOSPADM

## 2019-10-25 RX ORDER — METRONIDAZOLE 500 MG/100ML
500 INJECTION, SOLUTION INTRAVENOUS EVERY 12 HOURS
Status: DISCONTINUED | OUTPATIENT
Start: 2019-10-25 | End: 2019-10-28 | Stop reason: HOSPADM

## 2019-10-25 RX ORDER — DEXTROSE MONOHYDRATE 100 MG/ML
0-250 INJECTION, SOLUTION INTRAVENOUS AS NEEDED
Status: DISCONTINUED | OUTPATIENT
Start: 2019-10-25 | End: 2019-10-28 | Stop reason: HOSPADM

## 2019-10-25 RX ORDER — HYDROCHLOROTHIAZIDE 25 MG/1
25 TABLET ORAL DAILY
Status: DISCONTINUED | OUTPATIENT
Start: 2019-10-25 | End: 2019-10-25

## 2019-10-25 RX ORDER — LEVOFLOXACIN 5 MG/ML
500 INJECTION, SOLUTION INTRAVENOUS ONCE
Status: COMPLETED | OUTPATIENT
Start: 2019-10-25 | End: 2019-10-25

## 2019-10-25 RX ORDER — LEVOTHYROXINE SODIUM 88 UG/1
88 TABLET ORAL
Status: DISCONTINUED | OUTPATIENT
Start: 2019-10-25 | End: 2019-10-28 | Stop reason: HOSPADM

## 2019-10-25 RX ORDER — SODIUM CHLORIDE 0.9 % (FLUSH) 0.9 %
5-40 SYRINGE (ML) INJECTION EVERY 8 HOURS
Status: DISCONTINUED | OUTPATIENT
Start: 2019-10-25 | End: 2019-10-28 | Stop reason: HOSPADM

## 2019-10-25 RX ORDER — INSULIN LISPRO 100 [IU]/ML
INJECTION, SOLUTION INTRAVENOUS; SUBCUTANEOUS
Status: DISCONTINUED | OUTPATIENT
Start: 2019-10-25 | End: 2019-10-28 | Stop reason: HOSPADM

## 2019-10-25 RX ORDER — LEVOFLOXACIN 5 MG/ML
250 INJECTION, SOLUTION INTRAVENOUS EVERY 24 HOURS
Status: DISCONTINUED | OUTPATIENT
Start: 2019-10-26 | End: 2019-10-25

## 2019-10-25 RX ORDER — SODIUM CHLORIDE 0.9 % (FLUSH) 0.9 %
5-40 SYRINGE (ML) INJECTION AS NEEDED
Status: DISCONTINUED | OUTPATIENT
Start: 2019-10-25 | End: 2019-10-28 | Stop reason: HOSPADM

## 2019-10-25 RX ORDER — POTASSIUM CHLORIDE AND SODIUM CHLORIDE 900; 300 MG/100ML; MG/100ML
INJECTION, SOLUTION INTRAVENOUS CONTINUOUS
Status: DISCONTINUED | OUTPATIENT
Start: 2019-10-25 | End: 2019-10-28

## 2019-10-25 RX ORDER — POTASSIUM CHLORIDE 14.9 MG/ML
INJECTION INTRAVENOUS
Status: COMPLETED
Start: 2019-10-25 | End: 2019-10-25

## 2019-10-25 RX ORDER — LORATADINE 10 MG/1
10 TABLET ORAL DAILY
Status: DISCONTINUED | OUTPATIENT
Start: 2019-10-25 | End: 2019-10-28 | Stop reason: HOSPADM

## 2019-10-25 RX ORDER — FAMOTIDINE 20 MG/1
20 TABLET, FILM COATED ORAL DAILY
Status: DISCONTINUED | OUTPATIENT
Start: 2019-10-25 | End: 2019-10-28 | Stop reason: HOSPADM

## 2019-10-25 RX ORDER — MAGNESIUM SULFATE 100 %
4 CRYSTALS MISCELLANEOUS AS NEEDED
Status: DISCONTINUED | OUTPATIENT
Start: 2019-10-25 | End: 2019-10-28 | Stop reason: HOSPADM

## 2019-10-25 RX ADMIN — FAMOTIDINE 20 MG: 20 TABLET ORAL at 09:03

## 2019-10-25 RX ADMIN — POTASSIUM CHLORIDE 10 MEQ: 200 INJECTION, SOLUTION INTRAVENOUS at 01:55

## 2019-10-25 RX ADMIN — LEVOTHYROXINE SODIUM 88 MCG: 88 TABLET ORAL at 06:35

## 2019-10-25 RX ADMIN — Medication 10 ML: at 16:57

## 2019-10-25 RX ADMIN — Medication 10 ML: at 23:52

## 2019-10-25 RX ADMIN — POTASSIUM CHLORIDE AND SODIUM CHLORIDE: 900; 300 INJECTION, SOLUTION INTRAVENOUS at 10:56

## 2019-10-25 RX ADMIN — LORATADINE 10 MG: 10 TABLET ORAL at 09:05

## 2019-10-25 RX ADMIN — METRONIDAZOLE 500 MG: 500 INJECTION, SOLUTION INTRAVENOUS at 16:57

## 2019-10-25 RX ADMIN — Medication 10 ML: at 05:00

## 2019-10-25 RX ADMIN — LEVOFLOXACIN 500 MG: 5 INJECTION, SOLUTION INTRAVENOUS at 04:58

## 2019-10-25 RX ADMIN — SODIUM CHLORIDE, SODIUM LACTATE, POTASSIUM CHLORIDE, AND CALCIUM CHLORIDE 125 ML/HR: 600; 310; 30; 20 INJECTION, SOLUTION INTRAVENOUS at 01:56

## 2019-10-25 RX ADMIN — METRONIDAZOLE 500 MG: 500 INJECTION, SOLUTION INTRAVENOUS at 03:50

## 2019-10-25 RX ADMIN — POTASSIUM CHLORIDE 10 MEQ: 14.9 INJECTION INTRAVENOUS at 01:55

## 2019-10-25 RX ADMIN — Medication 10 ML: at 01:56

## 2019-10-25 RX ADMIN — SODIUM CHLORIDE 5 MG: 9 INJECTION INTRAMUSCULAR; INTRAVENOUS; SUBCUTANEOUS at 09:05

## 2019-10-25 NOTE — H&P
[address]  HISTORY AND PHYSICAL    Name:  Chaim Infante  MR#:  [id number]  :  1960  ACCOUNT #:  [de-identified]  ADMIT DATE:  10/24/2019      Admission order was placed while the patient was still at [facility] Emergency Room at [facility] on 10/24/2019, but the patient did not arrive at [facility] until 10/25/2019. The patient was seen, evaluated, and admitted by me on 10/25/2019. PRIMARY CARE PHYSICIAN:  Jaime Coats MD    SOURCE OF INFORMATION:  The patient. CHIEF COMPLAINT:  Abdominal pain. HISTORY OF PRESENT ILLNESS:  This is a 22-year-old woman with past medical history significant for left metastatic breast cancer, type 2 diabetes, hypertension, hypothyroidism, who was in her usual state of health until the day of presentation at the emergency room when the patient developed abdominal pain. The abdominal pain is located at the lower abdomen. The pain is constant, sharp pain, no radiation associated with nausea, vomiting as well as diarrhea. The patient described the diarrhea as bloody diarrhea. No sick contacts. The patient did not eat any unusual food. The patient is on [address] chemotherapy for metastatic breast cancer. According to the patient, this medication caused her to have a type 2 diabetes and was recently started on Glucophage. The patient was taken to [facility] Emergency Room at [facility]. When the patient arrived at the emergency room, CT scan of the abdomen and pelvis shows evidence of colitis. The patient was referred to the hospitalist service for evaluation for admission. The patient has no recent exposure to antibiotics. No history of C. difficile associated diarrhea. PAST MEDICAL HISTORY:  Type 2 diabetes, metastatic breast cancer, hypertension, hypothyroidism. ALLERGIES:  THE PATIENT IS ALLERGIC TO CODEINE AND ERYTHROMYCIN. MEDICATIONS:  1. Piqray 300 mg daily. 2.  Pepcid 20 mg daily.   3. Hydrochlorothiazide 25 mg daily. 4.  Synthroid 88 mcg daily. 5.  Claritin 10 mg daily. 6.  Glucophage 500 mg daily. 7.  Zofran 4 mg every 8 hours as needed for nausea. 8.  Compazine 5 mg every 6 hours as needed for nausea. FAMILY HISTORY:  This was reviewed. Mother had hypertension. PAST SURGICAL HISTORY:  This is significant for bilateral mastectomy. SOCIAL HISTORY:  No history of alcohol or tobacco abuse. REVIEW OF SYSTEMS:  HEAD, EYES, EARS, NOSE, AND THROAT:  No headache, no dizziness, no blurring of vision, no photophobia. RESPIRATORY SYSTEM:  No cough, no shortness of breath, no hemoptysis. CARDIOVASCULAR SYSTEM:  No chest pain, no orthopnea, no palpitation. GASTROINTESTINAL SYSTEM:  This is positive for abdominal pain, nausea and vomiting, and diarrhea. No constipation. GENITOURINARY SYSTEM:  No dysuria, no urgency, and no frequency. All other systems are reviewed and they are negative. PHYSICAL EXAMINATION:  GENERAL APPEARANCE:  The patient appeared ill in moderate distress. VITAL SIGNS:  On arrival at the emergency room, temperature 97.8, pulse of 85, respiratory rate 14, blood pressure 133/107, oxygen saturation 100% on room air. HEENT:  Head:  Normocephalic, atraumatic. Eyes:  Normal eye movements. No redness, no drainage, no discharge. Ears:  Normal external ears with no evidence of drainage. Nose:  No deformity and no drainage. Mouth and Throat:  No visible oral lesion. NECK:  Neck is supple. No JVD, no thyromegaly. CHEST:  Clear breath sounds. No wheezing, no crackles. HEART:  Normal S1 and S2, regular. No clinically appreciable murmur. ABDOMEN:  Soft, diffuse tenderness. No rebound tenderness. No guarding. Normal bowel sounds. CNS:  Alert and oriented x3. No gross focal neurological deficit. EXTREMITIES:  No edema. Pulses 2+ bilaterally. MUSCULOSKELETAL SYSTEM:  No obvious joint deformity or swelling.   SKIN:  No active skin lesions seen in the exposed part of the body. PSYCHIATRY:  Normal mood and affect. LYMPHATIC SYSTEM:  No cervical lymphadenopathy. DIAGNOSTIC DATA:  CT scan of the abdomen and pelvis shows findings consistent with a moderate-to-severe colitis, predominantly affecting the descending and transverse colon with pericolonic inflammatory stranding. There is no abscess or drainable fluid collection. EKG shows normal sinus rhythm. No significant ST or T-waves abnormalities. LABORATORY DATA:  Hematology:  WBC 18.5, hemoglobin at 14.0, hematocrit 43.1, platelets 164. Lipase level 132. Stool occult blood positive. Chemistry:  Sodium 134, potassium 2.5, chloride 94, CO2 28, glucose 182, BUN 19, creatinine 1.21, calcium 9.7, total bilirubin 0.5, ALT 30, AST 22, alkaline phosphatase 65, total protein at 7.7, albumin level 4.2, globulin at 3.5. Urinalysis: This is significant for negative nitrite, negative leukocyte esterase, and negative bacteria. ASSESSMENT:  1. Acute colitis. 2.  Hypokalemia. 3.  Type 2 diabetes. 4.  Metastatic breast cancer. 5.  Dehydration. 6.  Hypertension. 7.  Hypothyroidism. PLAN:  1. Acute colitis. We will admit the patient for further evaluation and treatment. We will start the patient on Flagyl and Levaquin for suspected acute colitis. We will carry out pain control, fluid therapy. Gastroenterology consult will be requested to assist in further evaluation and treatment of acute colitis. 2.  Hypokalemia. This is most likely as a result of the nausea and vomiting and diarrhea. We will replace potassium. We will check a potassium level. We will also check magnesium level. 3.  Type 2 diabetes. This is medication induced according to the patient. The patient will be placed on sliding scale with insulin coverage. We will check hemoglobin A1c level. We will hold oral agents till the time of discharge from the hospital.  4.  Metastatic left breast cancer.   Oncology consult will be requested for continuation of care. 5.  Dehydration. We will carry out fluid therapy. 6.  Hypertension. We will resume preadmission medication and monitor the patient's blood pressure closely. 7.  Hypothyroidism. We will continue with Synthroid. We will check a TSH level. 8.  Other Issues:  Code Status: The patient is a full code. We will request SCD for DVT prophylaxis. FUNCTIONAL STATUS PRIOR TO ADMISSION:  The patient is ambulatory without assistant or device. Tania Cuellar MD      [email or web address]  D:  10/25/2019 7:17  T:  10/25/2019 8:56  JOB #:  [id number]  CC:   Cruzito Medina MD

## 2019-10-25 NOTE — CONSULTS
[address] [facility]    [address] [address]        GASTROENTEROLOGY CONSULTATION NOTE  Will Nick Gonzalez  [phone] office  [phone] NP/PA in-hospital cell phone M-F until 4:30PM  After 5PM or on weekends, please call  for physician on call        NAME:  Natasha Saez   :   1960   MRN:   [id number]       Referring Physician: Dr. Abi Bueno Date: 10/25/2019 9:31 AM    Chief Complaint: abdominal pain and bloody diarrhea     History of Present Illness:  Patient is a 64 y.o. who is seen in consultation at the request of Dr. Glory Harada for acute colitis. Patient has a past medical history significant for left metastatic breast cancer, diabetes mellitus type II, hypertension, and hypothyroidism. Patient presented to the ED with complaints of abdominal pain. In the ED, CT abdomen/pelvis showed findings consistent with colitis. She was seen in the ED at [facility] and transferred to [facility] for admission on 10/24/19. Patient complains of a sudden onset of nausea, vomiting, abdominal pain, and bloody diarrhea yesterday afternoon around 5:00PM.  She reports vomiting at the onset followed by diarrhea and then blood. No hematemesis. She reports generalized abdominal pain with concentration to the left side. Patient reports multiple episodes of watery diarrhea followed by blood. No melena. No fevers. No history of similar symptoms. Patient reports that she was started on Metformin 2 days ago. No recent antibiotic use, travel outside of the [place] known sick contacts, or others with similar symptoms after having eaten the same meal.  Prior to the onset, patient was having a bowel movement every 1-2 days that was formed, brown, and easy to pass. No NSAID use. No anticoagulation. No alcohol or tobacco use. Colonoscopy in  by Dr. Howard Mabry was normal to cecum. A repeat colonoscopy was recommended in 10 years.      I have reviewed the emergency room note, hospital admission note, notes by all other clinicians who have seen the patient during this hospitalization to date. I have reviewed the problem list and the reason for this hospitalization. I have reviewed the allergies and the medications the patient was taking at home prior to this hospitalization. PMH:  Past Medical History:   Diagnosis Date    Cancer ([address]     breast, mets to bone    Hypertension        PSH:  Past Surgical History:   Procedure Laterality Date    HX MASTECTOMY Bilateral        Allergies: Allergies   Allergen Reactions    Codeine Nausea and Vomiting    Erythromycin Nausea and Vomiting       Home Medications:  Prior to Admission Medications   Prescriptions Last Dose Informant Patient Reported? Taking? alpelisib (PIQRAY) 300 mg/day (150 mg x 2) tab   Yes Yes   Sig: Take 300 mg by mouth daily. estradiol (ESTRING) 2 mg (7.5 mcg /24 hour) vaginal ring   Yes Yes   Sig: Insert 2 mg into vagina now. follow package directions   famotidine (PEPCID) 10 mg tablet   Yes Yes   Sig: Take 20 mg by mouth daily. hydroCHLOROthiazide (HYDRODIURIL) 25 mg tablet   Yes Yes   Sig: Take 25 mg by mouth daily. levothyroxine (SYNTHROID) 88 mcg tablet   Yes Yes   Sig: Take 88 mcg by mouth Daily (before breakfast). loratadine (CLARITIN) 10 mg tablet   Yes Yes   Sig: Take 10 mg by mouth daily. metFORMIN (GLUCOPHAGE) 500 mg tablet   Yes Yes   Sig: Take 500 mg by mouth daily. ondansetron (ZOFRAN ODT) 4 mg disintegrating tablet   No Yes   Sig: Take 1 Tab by mouth every eight (8) hours as needed for Nausea. Patient taking differently: Take 8 mg by mouth every eight (8) hours as needed for Nausea. prochlorperazine (COMPAZINE) 5 mg tablet   Yes Yes   Sig: Take 5 mg by mouth every six (6) hours as needed for Nausea.       Facility-Administered Medications: None       Hospital Medications:  Current Facility-Administered Medications   Medication Dose Route Frequency    famotidine (PEPCID) tablet 20 mg  20 mg Oral DAILY    hydroCHLOROthiazide (HYDRODIURIL) tablet 25 mg  25 mg Oral DAILY    levothyroxine (SYNTHROID) tablet 88 mcg  88 mcg Oral ACB    loratadine (CLARITIN) tablet 10 mg  10 mg Oral DAILY    . PHARMACY TO SUBSTITUTE PER PROTOCOL (Reordered from: alpelisib (PIQRAY) 300 mg/day (150 mg x 2) tab)    Per Protocol    sodium chloride (NS) flush 5-40 mL  5-40 mL IntraVENous Q8H    sodium chloride (NS) flush 5-40 mL  5-40 mL IntraVENous PRN    ondansetron (ZOFRAN) injection 4 mg  4 mg IntraVENous Q4H PRN    insulin lispro (HUMALOG) injection   SubCUTAneous AC&HS    glucose chewable tablet 16 g  4 Tab Oral PRN    glucagon (GLUCAGEN) injection 1 mg  1 mg IntraMUSCular PRN    metroNIDAZOLE (FLAGYL) IVPB premix 500 mg  500 mg IntraVENous Q12H    dextrose 10% infusion 0-250 mL  0-250 mL IntraVENous PRN    [START ON 10/26/2019] levoFLOXacin (LEVAQUIN) 250 mg in D5W IVPB  250 mg IntraVENous Q24H    0.9% sodium chloride with KCl 40 mEq/L infusion   IntraVENous CONTINUOUS    prochlorperazine (COMPAZINE) with saline injection 5 mg  5 mg IntraVENous Q6H PRN    acetaminophen (TYLENOL) tablet 650 mg  650 mg Oral Q4H PRN    morphine injection 2 mg  2 mg IntraVENous Q4H PRN    sodium chloride (NS) flush 5-40 mL  5-40 mL IntraVENous Q8H    sodium chloride (NS) flush 5-40 mL  5-40 mL IntraVENous PRN    naloxone (NARCAN) injection 0.4 mg  0.4 mg IntraVENous PRN       Social History:  Social History     Tobacco Use    Smoking status: Never Smoker    Smokeless tobacco: Never Used   Substance Use Topics    Alcohol use: Yes     Comment: ocasionally       Family History:  History reviewed. No pertinent family history.     Review of Systems:  Constitutional: negative fever, negative chills, negative weight loss  Eyes:   negative visual changes  ENT:   negative sore throat, tongue or lip swelling  Respiratory:  negative cough, negative dyspnea  Cards:  negative for chest pain, palpitations, lower extremity edema  GI:   See HPI  :  negative for frequency, dysuria  Integument:  negative for rash and pruritus  Heme:  negative for easy bruising and gum/nose bleeding  Musculoskeletal:negative for myalgias, back pain and muscle weakness  Neuro:    negative for headaches, dizziness  Psych: negative for feelings of anxiety, depression     Objective:     Patient Vitals for the past 8 hrs:   BP Temp Pulse Resp SpO2 Weight   10/25/19 0706 120/40 98.1 °F (36.7 °C) 92 16 96 % --   10/25/19 0342 145/65 98.9 °F (37.2 °C) (!) 102 16 100 % 63 kg (138 lb 14.2 oz)   10/25/19 0144 136/64 98.2 °F (36.8 °C) 97 16 96 % --     No intake/output data recorded. 10/23 1901 - 10/25 0700  In: 520.8 [I.V.:520.8]  Out: 250 [Urine:250]    EXAM:     CONST:  Pleasant female lying in bed, no acute distress   NEURO:  Alert and oriented x 3   HEENT: EOMI, no scleral icterus   LUNGS: CTA bilaterally anteriorly   CARD:  S1 S2   ABD:  Soft, left sided tenderness, no rebound, no guarding. + Bowel sounds. EXT:  Warm   PSYCH: Not anxious or agitate     Data Review     Recent Labs     10/25/19  0511 10/24/19  2102   WBC 16.8* 18.5*   HGB 13.3 14.0   HCT 41.9 43.1    272     Recent Labs     10/25/19  0511 10/24/19  2102    134*   K 2.8* 2.5*   CL 99 94*   CO2 28 28   BUN 10 19   CREA 0.97 1.21*   * 182*   PHOS 3.2  --    CA 8.7 9.7     Recent Labs     10/25/19  0511 10/24/19  2102   SGOT 17 22   AP 56 65   TP 7.1 7.7   ALB 3.7 4.2   GLOB 3.4 3.5   LPSE 99 132     No results for input(s): INR, PTP, APTT, INREXT in the last 72 hours. 10/24/19  EXAM: CT ABD PELV W CONT  Clinical history: Breast cancer, hematochezia  INDICATION: blood in stool, h/o breast cancer, abd pain     COMPARISON: 8/6/2018      CONTRAST: 100 mL of Isovue-370.     TECHNIQUE:   Following the uneventful intravenous administration of contrast, thin axial  images were obtained through the abdomen and pelvis.  Coronal and sagittal  reconstructions were generated. Oral contrast was administered. CT dose  reduction was achieved through use of a standardized protocol tailored for this  examination and automatic exposure control for dose modulation.     FINDINGS:   LUNG BASES: Clear. INCIDENTALLY IMAGED HEART AND MEDIASTINUM: Unremarkable. LIVER: There is a degree of hepatic steatosis which limits evaluation for the  presence of small hepatic metastases. No definite hepatic metastatic focus is  identified. GALLBLADDER: Unremarkable. SPLEEN: No mass. PANCREAS: No mass or ductal dilatation. ADRENALS: Unremarkable. KIDNEYS: No mass, calculus, or hydronephrosis. STOMACH: Unremarkable. SMALL BOWEL: No dilatation or wall thickening. Minimal hyperemia of terminal  ileal mucosa COLON: There is colonic wall thickening noted in the descending  colon and proximal sigmoid colon with pericolonic inflammatory stranding most  predominant adjacent to the descending colon. APPENDIX: Not clearly visualized. PERITONEUM: No ascites or pneumoperitoneum. RETROPERITONEUM: No lymphadenopathy or aortic aneurysm. REPRODUCTIVE ORGANS: Unremarkable. URINARY BLADDER: Nondistended. BONES: Multiple foci of osseous metastatic has progressed since 8/6/2018 exam.  ADDITIONAL COMMENTS: N/A     IMPRESSION  IMPRESSION:  Imaging findings consistent with a moderate to severe colitis predominantly  affecting the descending and transverse colon with pericolonic inflammatory  stranding. There is no abscess or drainable fluid collection.     Interval significant progression of osseous metastatic disease when compared to  the CT of the pelvis performed 8/6/2018. Assessment:   · Nausea, vomiting, abdominal pain, and bloody diarrhea: WBC 16.8, Hgb 13.3, normal LFTs, normal lipase.  CT abdomen/pelvis with IV and oral contrast (10/24/19): findings consistent with moderate to severe colitis predominantly affecting the descending and transverse colon with pericolonic inflammatory stranding, no abscess or drainable fluid collection; minimal hyperemia of terminal ileum. · Acute colitis  · Metastatic breast cancer  · Type II diabetes mellitus  · Hypertension  · Hypothyroidism     Patient Active Problem List   Diagnosis Code    Acute colitis K52.9     Plan:     · Check stool studies (WBC, stool culture, C. difficile, and ova & parasites)  · Continue supportive measures  · Continue antibiotics  · Next consider colonoscopy inpatient versus outpatient pending stool studies and clinical course  · Patient was discussed with and will be seen by Dr. Amilcar Pepe  · Thank you for allowing me to participate in care of Kenn Larkin     Signed By: Rebecca Lizarraga     10/25/2019  9:31 AM         Gastroenterology Attending Physician attestation statement and comments. This patient was seen and examined by me in a face-to-face visit today. I reviewed the medical record including lab work, imaging and other provider notes. I confirmed the history as described above. I spoke to the patient, reviewed the medical record including lab work, imaging and other provider notes. I discussed this case in detail with Rebecca He. I formulated an  assessment of this patient and developed a treatment plan. I agree with the above consultation note. I agree with the history, exam and assessment and plan as outlined in the note. I would like to add the following: Acute onset of diarrhea and rectal bleeding with imaging consistent with colitis. Differential diagnosis includes infectious colitis, IBD, less likely ischemic colitis. Will await results of stool studies. Continue antibiotics. If no improvement, we can consider diagnostic colonoscopy next. Weekend team to follow. Chuck Pepe MD

## 2019-10-25 NOTE — ED TRIAGE NOTES
Has been taking metformin x 2 days d/t new chemo drug raising bs. Drug Piqray, just increased from 150 to 300mg yesterday and metformin started at that time. +N/v/d.  +abd pain in llq. C/o bright red blood in diarrhea.

## 2019-10-25 NOTE — ED PROVIDER NOTES
68-year-old white female with history of metastatic breast cancer, presents to the emergency department with vomiting, diarrhea, blood in her stool, abdominal pain. Patient reports she is on a medicine called Bird N Hetal for breast cancer. This causes hyperglycemia so she was just started on metformin recently. She started with vomiting and diarrhea yesterday. She reports today she has had multiple episodes of bloody bowel movements. She reports diffuse abdominal pain. The abdominal pain is not worsened or made better by anything. She reports that the blood in her stool is dark red. She also has vomited multiple times a day. She has tried Zofran and Compazine without much relief today. Patient denies chest pain or shortness of breath. No fevers. No dysuria or hematuria. Past Medical History:   Diagnosis Date    Cancer ([address]     breast, mets to bone    Hypertension        Past Surgical History:   Procedure Laterality Date    HX MASTECTOMY Bilateral          History reviewed. No pertinent family history.     Social History     Socioeconomic History    Marital status:      Spouse name: Not on file    Number of children: Not on file    Years of education: Not on file    Highest education level: Not on file   Occupational History    Not on file   Social Needs    Financial resource strain: Not on file    Food insecurity:     Worry: Not on file     Inability: Not on file    Transportation needs:     Medical: Not on file     Non-medical: Not on file   Tobacco Use    Smoking status: Never Smoker    Smokeless tobacco: Never Used   Substance and Sexual Activity    Alcohol use: Yes     Comment: ocasionally    Drug use: Not on file    Sexual activity: Not on file   Lifestyle    Physical activity:     Days per week: Not on file     Minutes per session: Not on file    Stress: Not on file   Relationships    Social connections:     Talks on phone: Not on file     Gets together: Not on file Attends [employer] service: Not on file     Active member of club or organization: Not on file     Attends meetings of clubs or organizations: Not on file     Relationship status: Not on file    Intimate partner violence:     Fear of current or ex partner: Not on file     Emotionally abused: Not on file     Physically abused: Not on file     Forced sexual activity: Not on file   Other Topics Concern    Not on file   Social History Narrative    Not on file         ALLERGIES: Codeine and Erythromycin    Review of Systems   Constitutional: Negative for fever. HENT: Negative for facial swelling. Eyes: Negative for pain. Respiratory: Negative for cough, chest tightness and shortness of breath. Cardiovascular: Negative for chest pain and leg swelling. Gastrointestinal: Positive for abdominal pain, blood in stool, diarrhea and vomiting. Endocrine: Negative for polyuria. Genitourinary: Negative for difficulty urinating and flank pain. Musculoskeletal: Negative for arthralgias and back pain. Skin: Negative for color change. Allergic/Immunologic: Negative for immunocompromised state. Neurological: Negative for dizziness and headaches. Hematological: Does not bruise/bleed easily. Psychiatric/Behavioral: Negative for confusion. All other systems reviewed and are negative. There were no vitals filed for this visit. Physical Exam   Constitutional: She is oriented to person, place, and time. She appears well-developed and well-nourished. Chronically ill-appearing, no distress   HENT:   Head: Normocephalic and atraumatic. Right Ear: External ear normal.   Left Ear: External ear normal.   Nose: Nose normal.   Mouth/Throat: Oropharynx is clear and moist.   Eyes: Pupils are equal, round, and reactive to light. EOM are normal. No scleral icterus. Neck: Normal range of motion. Neck supple. No JVD present. No tracheal deviation present. No thyromegaly present.    Cardiovascular: Normal rate, regular rhythm, normal heart sounds and intact distal pulses. Exam reveals no friction rub. No murmur heard. Pulmonary/Chest: Effort normal and breath sounds normal. No stridor. No respiratory distress. She has no wheezes. She has no rales. She exhibits no tenderness. Abdominal: Soft. Bowel sounds are normal. She exhibits no distension. There is tenderness. There is no rebound and no guarding. Diffuse abdominal pain to palpation   Genitourinary:   Genitourinary Comments: Stool is dark red   Musculoskeletal: Normal range of motion. She exhibits no edema or tenderness. Lymphadenopathy:     She has no cervical adenopathy. Neurological: She is alert and oriented to person, place, and time. She has normal reflexes. No cranial nerve deficit. Coordination normal.   Skin: Skin is warm and dry. No rash noted. No erythema. Psychiatric: She has a normal mood and affect. Her behavior is normal. Judgment and thought content normal.   Nursing note and vitals reviewed. MDM  Number of Diagnoses or Management Options  Bloody diarrhea:   Dehydration:   Hypokalemia:   Nausea and vomiting, intractability of vomiting not specified, unspecified vomiting type:   Noninfectious gastroenteritis, unspecified type:   Diagnosis management comments: Patient has bloody stools and abdominal pain with vomiting and diarrhea. Will check blood work and urinalysis. Will check CT abdomen. Will give IV fluids and Zofran. Will send stool sample. Will reassess.        Amount and/or Complexity of Data Reviewed  Clinical lab tests: ordered and reviewed  Tests in the radiology section of CPT®: ordered and reviewed  Tests in the medicine section of CPT®: reviewed and ordered  Discussion of test results with the performing providers: yes  Decide to obtain previous medical records or to obtain history from someone other than the patient: yes  Obtain history from someone other than the patient: yes  Review and summarize past medical records: yes  Discuss the patient with other providers: yes  Independent visualization of images, tracings, or specimens: yes    Risk of Complications, Morbidity, and/or Mortality  Presenting problems: high  Diagnostic procedures: high  Management options: high    Critical Care  Total time providing critical care: 30-74 minutes (Total critical care time spent exclusive of procedures:  30 min)         Procedures  Pt w/ blood in stools  Hemacult of stools was positive  Pt appears dehydrated clinically  Abd pain as well      9:38 PM  K is 2.5  Will replace with KCL and IVF  Pt refuses PO KCL. Will give runs of KCL  EKG pending    9:47 PM  EKG: Sinus tachycardia, , normal axis, no ST elevations or depressions  Toñito Julien MD      CT shows colitis    Will give Zosyn and admit    Hospitalist Chelsey for Admission  10:26 PM    ED Room Number: SER05/05  Patient Name and age:  Mariah Garcia 64 y.o.  female  Working Diagnosis:   1. Bloody diarrhea    2. Dehydration    3. Hypokalemia    4. Nausea and vomiting, intractability of vomiting not specified, unspecified vomiting type    5.  Noninfectious gastroenteritis, unspecified type      Readmission: no  Isolation Requirements:  no  Recommended Level of Care:  telemetry  Code Status:  Full Code  Department:[place] [facility] - [phone]  Other:      10:37 PM  I tiger texted Dr Boris Siu who will admit

## 2019-10-25 NOTE — ED NOTES
Pt placed on CCM for IV potassium. Pt denies any complaints at this time. Assessment unchanged. No vomiting or diarrhea since arrival to ed.

## 2019-10-25 NOTE — PROGRESS NOTES
Clinical Pharmacy Note: Metronidazole Dosing    Please note that the metronidazole dose for Russ Flowers has been changed to 500 mg IV q12h per [facility]-approved protocol. Please contact the pharmacy with any questions.     Yokasta Montoya

## 2019-10-25 NOTE — ROUTINE PROCESS
TRANSFER - OUT REPORT:    Verbal report given to He Nettles RN(name) on [facility]  being transferred to PBSU(unit) for routine progression of care       Report consisted of patients Situation, Background, Assessment and   Recommendations(SBAR). Information from the following report(s) SBAR was reviewed with the receiving nurse. Lines:   Peripheral IV 10/24/19 Right Antecubital (Active)   Site Assessment Clean, dry, & intact 10/24/2019  9:07 PM   Phlebitis Assessment 0 10/24/2019  9:07 PM   Infiltration Assessment 0 10/24/2019  9:07 PM   Dressing Status Clean, dry, & intact 10/24/2019  9:07 PM   Hub Color/Line Status Pink;Patent 10/24/2019  9:07 PM   Action Taken Blood drawn 10/24/2019  9:07 PM       Peripheral IV 10/24/19 Right Hand (Active)   Site Assessment Clean, dry, & intact 10/24/2019 11:04 PM   Phlebitis Assessment 0 10/24/2019 11:04 PM   Infiltration Assessment 0 10/24/2019 11:04 PM   Dressing Status Clean, dry, & intact 10/24/2019 11:04 PM        Opportunity for questions and clarification was provided.       Patient transported with:   Monitor  Tech

## 2019-10-25 NOTE — PROGRESS NOTES
Consult received   This is a former patient of Dr. Natalie Spain [facility]  Please consult [facility]    Thank you

## 2019-10-25 NOTE — PROGRESS NOTES
Day #1 of Levaquin  Indication:  acute colitis  Current regimen:  500mg IV q 24 h  Abx regimen: flagyl + levaquin  Recent Labs     10/24/19  2102   WBC 18.5*   CREA 1.21*   BUN 19     Est CrCl: 46.5 ml/min; UO: - ml/kg/hr  Temp (24hrs), Av.1 °F (36.7 °C), Min:97.8 °F (36.6 °C), Max:98.4 °F (36.9 °C)    Cultures: no micro pending    Plan: Change to 500 mg x 1, then 250mg x q 24 h per [facility] Protocol with respect to renal function. Pharmacy will continue to monitor patient daily and will make dosage adjustments based upon changing renal function.

## 2019-10-25 NOTE — PROGRESS NOTES
Problem: Falls - Risk of  Goal: *Absence of Falls  Description  Document Parker Cirilo Fall Risk and appropriate interventions in the flowsheet.   Outcome: Progressing Towards Goal  Note:   Fall Risk Interventions:    Medication Interventions: Patient to call before getting OOB, Teach patient to arise slowly

## 2019-10-25 NOTE — PROGRESS NOTES
DARIUS PLAN:    1. Patient will be discharged home in care of her     2. Hematology/Oncology consult for metastatic breast cancer treatment    3. Patient's  will provide transportation home. Reason for Admission: Acute Colitis                     RRAT Score:   0                  Plan for utilizing home health: Not indicated                         Current Advanced Directive/Advance Care Plan: No ACP in the chart                         Transition of Care Plan:  CM met with patient to discuss discharge planning. Patient lives with her  in their private residence. She is independent without any assistive devices. Patient takes [address] for her metastatic breast cancer and receives the medication from a [facility]. Patient says her insurance pays for it and did not have any issues with coverage. Patient also said she is no longer employed by choice and voiced that she did not have any financial stressors as a result. Her oncologist is Dr. Ward George and she sees him every 2 weeks. Patient's  will provide transportation home and she did not voice any discharge barriers. Katie Donis MSA, RN, CRM. Care Management Interventions  PCP Verified by CM: Yes  Palliative Care Criteria Met (RRAT>21 & CHF Dx)?: No  Mode of Transport at Discharge:  Other (see comment)(Private car)  Transition of Care Consult (CM Consult): Discharge Planning  MyChart Signup: No  Discharge Durable Medical Equipment: No  Health Maintenance Reviewed: Yes  Physical Therapy Consult: No  Occupational Therapy Consult: No  Speech Therapy Consult: No  Current Support Network: Lives with Spouse  Confirm Follow Up Transport: Family  Plan discussed with Pt/Family/Caregiver: Yes  Discharge Location  Discharge Placement: Home with family assistance

## 2019-10-25 NOTE — ED NOTES
Pt sleeping on stretcher, wakes easily. Pt remains on ccm and sp02 on room air. No acute distress noted. Pt assessment unchanged at this time. No nausea/vomiting/diarrhea. Call light within reach. Significant other remains at bedside. Updated on POC and agreeable.

## 2019-10-25 NOTE — PROGRESS NOTES
Hospitalist Progress Note  Diana Roy MD  Answering service: [phone] OR [phone] from in house phone      Date of Service:  10/25/2019  NAME:  Maria Elena Vega                                                         :  1960                                               MRN:  [id number]    Admission summary   Maria Elena Vega is a 64year old female presented to the [place] ED for bloody diarrhea and colitis. CT abdomen showed evidence of colitis. She is current on chemotherapy for metastatic breast cancer. Subjective/interval history    F/u for Colitis  -abdominal pain better,no nausea or vomiting. Assessment and plan  Acute likely infectious colitis   -CT abdomen and pelvis showed colonic wall thickening noted in the descending colon and proximal sigmoid colon with pericolonic inflammatory stranding most predominant adjacent to the descending colon. There is no abscess or drainable fluid collection   -Continue supportive therapy. IV antibiotics. C diff ordered,no specimen sent yet  -She denied recent exposure to antibiotics or hospitalization     Moderate hypokalemia due to GI loss: continue replacement     Type II DM. A1C 7. 6. She attributes to chemotherapy side effect. She was just started on metformin She is refusing correction insulin       Metastatic breat ca,on piqray        Diet:clear liquid diet  Code status:full  SCD  Disposition:home   Plan of care discussed with:patient. Current facility administered and prior to admit medications reviewed. x         Review of Systems:  A comprehensive review of systems was negative except for that written in the HPI.         PHYSICAL EXAM:  O:  Visit Vitals  /55 (BP 1 Location: Right arm, BP Patient Position: At rest)   Pulse (!) 104   Temp 98 °F (36.7 °C)   Resp 16   Ht 5' 3\" (1.6 m)   Wt 63 kg (138 lb 14.2 oz)   SpO2 96%   BMI 24.60 kg/m²     GENERAL:  Alert, oriented, cooperative, no apparent distress  HEENT: Normocephalic, atraumatic, non icteric sclerae, non pallor conjuctivae, EOMs intact, PERRLA. NECK: Supple, trachea midline, no adenopathy, no thyromegally or tenderness, no carotid bruit and no JVD. LUNGS:   Vesicular breath sounds bilaterally, no added sounds. HEART:   S1 and S2 well heard,RRR,  no murmur, click, rub or gallop. ABDOMEB:   Normoactive. LLQ tenderness w/o rebound or guarding. EXTREMETIES:  Atraumatic, acyanotic, no edema  PULSES: 2+ and symmetric all extremities. SKIN:  No rashes or lesions  NEUROLOGY: Alert and oriented to PPT, CNII-XII intact. Motor and sensory exam grossly intact.        Recent labs & imaging reviewed:    Problem List as of 10/25/2019 Date Reviewed: 10/25/2019          Codes Class Noted - Resolved    * (Principal) Acute colitis ICD-10-CM: K52.9  ICD-9-CM: 558.9  10/24/2019 - Present                Romeo Kelly MD  Internal Medicine  Date of Service: 10/25/2019

## 2019-10-26 LAB
ALBUMIN SERPL-MCNC: 2.7 G/DL (ref 3.5–5)
ALBUMIN/GLOB SERPL: 0.9 {RATIO} (ref 1.1–2.2)
ALP SERPL-CCNC: 46 U/L (ref 45–117)
ALT SERPL-CCNC: 24 U/L (ref 12–78)
ANION GAP SERPL CALC-SCNC: 8 MMOL/L (ref 5–15)
AST SERPL-CCNC: 14 U/L (ref 15–37)
BASOPHILS # BLD: 0 K/UL (ref 0–0.1)
BASOPHILS NFR BLD: 0 % (ref 0–1)
BILIRUB SERPL-MCNC: 0.6 MG/DL (ref 0.2–1)
BUN SERPL-MCNC: 3 MG/DL (ref 6–20)
BUN/CREAT SERPL: 5 (ref 12–20)
C DIFF GDH STL QL: NEGATIVE
C DIFF TOX A+B STL QL IA: NEGATIVE
CALCIUM SERPL-MCNC: 7.8 MG/DL (ref 8.5–10.1)
CAMPYLOBACTER SPECIES, DNA: NEGATIVE
CHLORIDE SERPL-SCNC: 105 MMOL/L (ref 97–108)
CO2 SERPL-SCNC: 25 MMOL/L (ref 21–32)
CREAT SERPL-MCNC: 0.66 MG/DL (ref 0.55–1.02)
DIFFERENTIAL METHOD BLD: ABNORMAL
ENTEROTOXIGEN E COLI, DNA: NEGATIVE
EOSINOPHIL # BLD: 0 K/UL (ref 0–0.4)
EOSINOPHIL NFR BLD: 0 % (ref 0–7)
ERYTHROCYTE [DISTWIDTH] IN BLOOD BY AUTOMATED COUNT: 13.9 % (ref 11.5–14.5)
GLOBULIN SER CALC-MCNC: 2.9 G/DL (ref 2–4)
GLUCOSE BLD STRIP.AUTO-MCNC: 104 MG/DL (ref 65–100)
GLUCOSE BLD STRIP.AUTO-MCNC: 122 MG/DL (ref 65–100)
GLUCOSE BLD STRIP.AUTO-MCNC: 123 MG/DL (ref 65–100)
GLUCOSE BLD STRIP.AUTO-MCNC: 150 MG/DL (ref 65–100)
GLUCOSE SERPL-MCNC: 109 MG/DL (ref 65–100)
HCT VFR BLD AUTO: 37.1 % (ref 35–47)
HGB BLD-MCNC: 12.1 G/DL (ref 11.5–16)
IMM GRANULOCYTES # BLD AUTO: 0.1 K/UL (ref 0–0.04)
IMM GRANULOCYTES NFR BLD AUTO: 1 % (ref 0–0.5)
INTERPRETATION: NORMAL
LYMPHOCYTES # BLD: 1.3 K/UL (ref 0.8–3.5)
LYMPHOCYTES NFR BLD: 8 % (ref 12–49)
MCH RBC QN AUTO: 30.6 PG (ref 26–34)
MCHC RBC AUTO-ENTMCNC: 32.6 G/DL (ref 30–36.5)
MCV RBC AUTO: 93.9 FL (ref 80–99)
MONOCYTES # BLD: 1.1 K/UL (ref 0–1)
MONOCYTES NFR BLD: 7 % (ref 5–13)
NEUTS SEG # BLD: 13.4 K/UL (ref 1.8–8)
NEUTS SEG NFR BLD: 84 % (ref 32–75)
NRBC # BLD: 0 K/UL (ref 0–0.01)
NRBC BLD-RTO: 0 PER 100 WBC
P SHIGELLOIDES DNA STL QL NAA+PROBE: NEGATIVE
PLATELET # BLD AUTO: 236 K/UL (ref 150–400)
PMV BLD AUTO: 10.1 FL (ref 8.9–12.9)
POTASSIUM SERPL-SCNC: 3 MMOL/L (ref 3.5–5.1)
PROT SERPL-MCNC: 5.6 G/DL (ref 6.4–8.2)
RBC # BLD AUTO: 3.95 M/UL (ref 3.8–5.2)
SALMONELLA SPECIES, DNA: NEGATIVE
SERVICE CMNT-IMP: ABNORMAL
SHIGA TOXIN PRODUCING, DNA: NEGATIVE
SHIGELLA SP+EIEC IPAH STL QL NAA+PROBE: NEGATIVE
SODIUM SERPL-SCNC: 138 MMOL/L (ref 136–145)
VIBRIO SPECIES, DNA: NEGATIVE
WBC # BLD AUTO: 15.8 K/UL (ref 3.6–11)
WBC #/AREA STL HPF: >20 /HPF (ref 0–4)
Y. ENTEROCOLITICA, DNA: NEGATIVE

## 2019-10-26 PROCEDURE — 82962 GLUCOSE BLOOD TEST: CPT

## 2019-10-26 PROCEDURE — 65660000000 HC RM CCU STEPDOWN

## 2019-10-26 PROCEDURE — 74011250637 HC RX REV CODE- 250/637: Performed by: INTERNAL MEDICINE

## 2019-10-26 PROCEDURE — 80053 COMPREHEN METABOLIC PANEL: CPT

## 2019-10-26 PROCEDURE — 74011250636 HC RX REV CODE- 250/636: Performed by: INTERNAL MEDICINE

## 2019-10-26 PROCEDURE — 36415 COLL VENOUS BLD VENIPUNCTURE: CPT

## 2019-10-26 PROCEDURE — 74011250637 HC RX REV CODE- 250/637: Performed by: HOSPITALIST

## 2019-10-26 PROCEDURE — 85025 COMPLETE CBC W/AUTO DIFF WBC: CPT

## 2019-10-26 PROCEDURE — 74011250636 HC RX REV CODE- 250/636: Performed by: HOSPITALIST

## 2019-10-26 RX ORDER — POTASSIUM CHLORIDE 750 MG/1
20 TABLET, FILM COATED, EXTENDED RELEASE ORAL
Status: COMPLETED | OUTPATIENT
Start: 2019-10-26 | End: 2019-10-26

## 2019-10-26 RX ADMIN — LEVOTHYROXINE SODIUM 88 MCG: 88 TABLET ORAL at 07:56

## 2019-10-26 RX ADMIN — METRONIDAZOLE 500 MG: 500 INJECTION, SOLUTION INTRAVENOUS at 15:26

## 2019-10-26 RX ADMIN — Medication 10 ML: at 15:27

## 2019-10-26 RX ADMIN — POTASSIUM CHLORIDE 20 MEQ: 750 TABLET, EXTENDED RELEASE ORAL at 13:46

## 2019-10-26 RX ADMIN — METRONIDAZOLE 500 MG: 500 INJECTION, SOLUTION INTRAVENOUS at 04:11

## 2019-10-26 RX ADMIN — LEVOFLOXACIN 750 MG: 5 INJECTION, SOLUTION INTRAVENOUS at 04:11

## 2019-10-26 RX ADMIN — POTASSIUM CHLORIDE AND SODIUM CHLORIDE: 900; 300 INJECTION, SOLUTION INTRAVENOUS at 18:21

## 2019-10-26 RX ADMIN — FAMOTIDINE 20 MG: 20 TABLET ORAL at 09:30

## 2019-10-26 RX ADMIN — ONDANSETRON 4 MG: 2 INJECTION INTRAMUSCULAR; INTRAVENOUS at 18:18

## 2019-10-26 RX ADMIN — POTASSIUM CHLORIDE AND SODIUM CHLORIDE: 900; 300 INJECTION, SOLUTION INTRAVENOUS at 03:15

## 2019-10-26 RX ADMIN — Medication 10 ML: at 08:07

## 2019-10-26 RX ADMIN — Medication 10 ML: at 03:16

## 2019-10-26 RX ADMIN — LORATADINE 10 MG: 10 TABLET ORAL at 09:30

## 2019-10-26 NOTE — PROGRESS NOTES
[address], [address] [place] [address]    GI PROGRESS NOTE    NAME: Mariah Garcia   :  1960   MRN:  [id number]       Subjective:     DOING BETTER, NO MORE BLEEDING  Review of Systems    Constitutional: negative fever, negative chills, negative weight loss  Eyes:   negative visual changes  ENT:   negative sore throat, tongue or lip swelling  Respiratory:  negative cough, negative dyspnea  Cards:  negative for chest pain, palpitations, lower extremity edema  GI:   See HPI  :  negative for frequency, dysuria  Integument:  negative for rash and pruritus  Heme:  negative for easy bruising and gum/nose bleeding  Musculoskel: negative for myalgias,  back pain and muscle weakness  Neuro: negative for headaches, dizziness, vertigo  Psych:  negative for feelings of anxiety, depression         Objective:     VITALS:   Last 24hrs VS reviewed since prior progress note. Most recent are:  Visit Vitals  /57 (BP 1 Location: Right arm, BP Patient Position: At rest)   Pulse (!) 103   Temp 99 °F (37.2 °C)   Resp 18   Ht 5' 3\" (1.6 m)   Wt 66.5 kg (146 lb 9.7 oz)   SpO2 99%   BMI 25.97 kg/m²       Intake/Output Summary (Last 24 hours) at 10/26/2019 1716  Last data filed at 10/26/2019 0434  Gross per 24 hour   Intake 1166.66 ml   Output 950 ml   Net 216.66 ml     PHYSICAL EXAM:  General: WD, WN. Alert, cooperative, no acute distress    HEENT: NC, Atraumatic. PERRLA, EOMI. Anicteric sclerae. Lungs:  CTA Bilaterally. No Wheezing/Rhonchi/Rales. Heart:  Regular  rhythm,  No murmur (), No Rubs, No Gallops  Abdomen: Soft, Non distended, Non tender.  +Bowel sounds, no HSM  Extremities: No c/c/e  Neurologic:  CN 2-12 gi, Alert and oriented X 3. No acute neurological distress   Psych:   Good insight. Not anxious nor agitated.     Lab Data Reviewed:   Recent Labs     10/26/19  0408 10/25/19  0511   WBC 15.8* 16.8*   HGB 12.1 13.3   HCT 37.1 41.9    269     Recent Labs     10/26/19  0408 10/25/19  0511    136   K 3.0* 2.8*    99   CO2 25 28   BUN 3* 10   CREA 0.66 0.97   * 154*   PHOS  --  3.2   CA 7.8* 8.7     Recent Labs     10/26/19  0408 10/25/19  0511 10/24/19  2102   SGOT 14* 17 22   AP 46 56 65   TP 5.6* 7.1 7.7   ALB 2.7* 3.7 4.2   GLOB 2.9 3.4 3.5   LPSE  --  99 132       ________________________________________________________________________       Assessment:   · Colitis suspect infectious  · Diarrhea, improving     Patient Active Problem List   Diagnosis Code    Acute colitis K52.9     Plan:   · Continue on supportive care  · Follow stool studies  · Will follow     Signed By: Ninoska Borjas MD     10/26/2019  5:16 PM

## 2019-10-26 NOTE — ROUTINE PROCESS
Bedside and Verbal shift change report given to Lisa (oncoming nurse) by Jennifer Florez (offgoing nurse).  Report included the following information SBAR, Kardex, Intake/Output, MAR and Cardiac Rhythm SR.

## 2019-10-26 NOTE — PROGRESS NOTES
Hospitalist Progress Note  Brooke Amezquita MD  Answering service: [phone] OR [phone] from in house phone      Date of Service:  10/26/2019  NAME:  Partha Díaz                                                         :  1960                                               MRN:  [id number]    Admission summary   Partha Díaz is a 64year old female presented to the [facility] ED for bloody diarrhea and colitis. CT abdomen showed evidence of colitis. She is current on chemotherapy for metastatic breast cancer. Subjective/interval history    F/u for Colitis  -Still with frequent diarrhea. Abdominal better though. No nausea or vomiting. Assessment and plan  Acute likely infectious colitis   -CT abdomen and pelvis showed colonic wall thickening noted in the descending colon and proximal sigmoid colon with pericolonic inflammatory stranding most predominant adjacent to the descending colon. There is no abscess or drainable fluid collection   -Continue supportive therapy. IV antibiotics. C diff negative  -She denied recent exposure to antibiotics or hospitalization   -GI follwoing    Moderate hypokalemia due to GI loss: continue replacement with iv  -she could not tolerate oral potassium     Type II DM. A1C 7. 6. She attributes to chemotherapy side effect. She was just started on metformin She is refusing correction insulin       Metastatic breat ca,on piqray        Diet:clear liquid diet  Code status:full  SCD  Disposition:home   Plan of care discussed with:patient. Current facility administered and prior to admit medications reviewed. x         Review of Systems:  A comprehensive review of systems was negative except for that written in the HPI.         PHYSICAL EXAM:  O:  Visit Vitals  /57 (BP 1 Location: Right arm, BP Patient Position: At rest)   Pulse (!) 103   Temp 99 °F (37.2 °C)   Resp 18   Ht 5' 3\" (1.6 m)   Wt 66.5 kg (146 lb 9.7 oz)   SpO2 99%   BMI 25.97 kg/m² GENERAL:  Alert, oriented, cooperative, no apparent distress  HEENT:  Normocephalic, atraumatic, non icteric sclerae, non pallor conjuctivae, EOMs intact, PERRLA. NECK: Supple, trachea midline, no adenopathy, no thyromegally or tenderness, no carotid bruit and no JVD. LUNGS:   Vesicular breath sounds bilaterally, no added sounds. HEART:   S1 and S2 well heard,RRR,  no murmur, click, rub or gallop. ABDOMEB:   Normoactive. LLQ tenderness w/o rebound or guarding,improved. EXTREMETIES:  Atraumatic, acyanotic, no edema  PULSES: 2+ and symmetric all extremities. SKIN:  No rashes or lesions  NEUROLOGY: Alert and oriented to PPT, CNII-XII intact. Motor and sensory exam grossly intact.        Recent labs & imaging reviewed:    Problem List as of 10/26/2019 Date Reviewed: 10/25/2019          Codes Class Noted - Resolved    * (Principal) Acute colitis ICD-10-CM: K52.9  ICD-9-CM: 558.9  10/24/2019 - Present                Waleska Restrepo MD  Internal Medicine  Date of Service: 10/26/2019

## 2019-10-26 NOTE — PROGRESS NOTES
Problem: Risk for Spread of Infection  Goal: Prevent transmission of infectious organism to others  Description  Prevent the transmission of infectious organisms to other patients, staff members, and visitors. Outcome: Progressing Towards Goal     Problem: Falls - Risk of  Goal: *Absence of Falls  Description  Document Donata Carrel Fall Risk and appropriate interventions in the flowsheet.   Outcome: Progressing Towards Goal  Note:   Fall Risk Interventions:            Medication Interventions: Patient to call before getting OOB, Teach patient to arise slowly         History of Falls Interventions: Room close to nurse's station, Door open when patient unattended

## 2019-10-26 NOTE — PROGRESS NOTES
6051 Pt voiced her  talked with her oncologist's nurse and Dr. Araceli Ashby, oncologist doesn't want pt to take insulin, metformin and chemo meds during her hospitalization and pt said this is not the first time not taking these meds. 1300 Pt's bowl color changed from bloody to brown. 200 Pt wants heating pad for abdominal pain so I put it on her.

## 2019-10-27 LAB
ANION GAP SERPL CALC-SCNC: 7 MMOL/L (ref 5–15)
BASOPHILS # BLD: 0 K/UL (ref 0–0.1)
BASOPHILS NFR BLD: 0 % (ref 0–1)
BUN SERPL-MCNC: 1 MG/DL (ref 6–20)
BUN/CREAT SERPL: 2 (ref 12–20)
CALCIUM SERPL-MCNC: 7.6 MG/DL (ref 8.5–10.1)
CHLORIDE SERPL-SCNC: 109 MMOL/L (ref 97–108)
CO2 SERPL-SCNC: 24 MMOL/L (ref 21–32)
CREAT SERPL-MCNC: 0.61 MG/DL (ref 0.55–1.02)
DIFFERENTIAL METHOD BLD: ABNORMAL
EOSINOPHIL # BLD: 0 K/UL (ref 0–0.4)
EOSINOPHIL NFR BLD: 0 % (ref 0–7)
ERYTHROCYTE [DISTWIDTH] IN BLOOD BY AUTOMATED COUNT: 13.8 % (ref 11.5–14.5)
GLUCOSE BLD STRIP.AUTO-MCNC: 101 MG/DL (ref 65–100)
GLUCOSE BLD STRIP.AUTO-MCNC: 109 MG/DL (ref 65–100)
GLUCOSE BLD STRIP.AUTO-MCNC: 112 MG/DL (ref 65–100)
GLUCOSE SERPL-MCNC: 113 MG/DL (ref 65–100)
HCT VFR BLD AUTO: 33.3 % (ref 35–47)
HGB BLD-MCNC: 10.8 G/DL (ref 11.5–16)
IMM GRANULOCYTES # BLD AUTO: 0.2 K/UL (ref 0–0.04)
IMM GRANULOCYTES NFR BLD AUTO: 1 % (ref 0–0.5)
LYMPHOCYTES # BLD: 1.4 K/UL (ref 0.8–3.5)
LYMPHOCYTES NFR BLD: 9 % (ref 12–49)
MAGNESIUM SERPL-MCNC: 1.8 MG/DL (ref 1.6–2.4)
MCH RBC QN AUTO: 30.5 PG (ref 26–34)
MCHC RBC AUTO-ENTMCNC: 32.4 G/DL (ref 30–36.5)
MCV RBC AUTO: 94.1 FL (ref 80–99)
MONOCYTES # BLD: 0.8 K/UL (ref 0–1)
MONOCYTES NFR BLD: 6 % (ref 5–13)
NEUTS SEG # BLD: 12.3 K/UL (ref 1.8–8)
NEUTS SEG NFR BLD: 84 % (ref 32–75)
NRBC # BLD: 0 K/UL (ref 0–0.01)
NRBC BLD-RTO: 0 PER 100 WBC
PLATELET # BLD AUTO: 207 K/UL (ref 150–400)
PMV BLD AUTO: 9.8 FL (ref 8.9–12.9)
POTASSIUM SERPL-SCNC: 3.2 MMOL/L (ref 3.5–5.1)
RBC # BLD AUTO: 3.54 M/UL (ref 3.8–5.2)
SERVICE CMNT-IMP: ABNORMAL
SODIUM SERPL-SCNC: 140 MMOL/L (ref 136–145)
WBC # BLD AUTO: 14.7 K/UL (ref 3.6–11)

## 2019-10-27 PROCEDURE — 74011250637 HC RX REV CODE- 250/637: Performed by: INTERNAL MEDICINE

## 2019-10-27 PROCEDURE — 85025 COMPLETE CBC W/AUTO DIFF WBC: CPT

## 2019-10-27 PROCEDURE — 36415 COLL VENOUS BLD VENIPUNCTURE: CPT

## 2019-10-27 PROCEDURE — 82962 GLUCOSE BLOOD TEST: CPT

## 2019-10-27 PROCEDURE — 74011250636 HC RX REV CODE- 250/636: Performed by: HOSPITALIST

## 2019-10-27 PROCEDURE — 83735 ASSAY OF MAGNESIUM: CPT

## 2019-10-27 PROCEDURE — 74011250636 HC RX REV CODE- 250/636: Performed by: INTERNAL MEDICINE

## 2019-10-27 PROCEDURE — 65660000000 HC RM CCU STEPDOWN

## 2019-10-27 PROCEDURE — 80048 BASIC METABOLIC PNL TOTAL CA: CPT

## 2019-10-27 RX ORDER — POTASSIUM CHLORIDE 7.45 MG/ML
10 INJECTION INTRAVENOUS
Status: COMPLETED | OUTPATIENT
Start: 2019-10-27 | End: 2019-10-27

## 2019-10-27 RX ADMIN — POTASSIUM CHLORIDE AND SODIUM CHLORIDE: 900; 300 INJECTION, SOLUTION INTRAVENOUS at 10:58

## 2019-10-27 RX ADMIN — POTASSIUM CHLORIDE 10 MEQ: 10 INJECTION, SOLUTION INTRAVENOUS at 09:19

## 2019-10-27 RX ADMIN — LEVOFLOXACIN 750 MG: 5 INJECTION, SOLUTION INTRAVENOUS at 04:18

## 2019-10-27 RX ADMIN — METRONIDAZOLE 500 MG: 500 INJECTION, SOLUTION INTRAVENOUS at 04:18

## 2019-10-27 RX ADMIN — FAMOTIDINE 20 MG: 20 TABLET ORAL at 09:19

## 2019-10-27 RX ADMIN — Medication 10 ML: at 06:37

## 2019-10-27 RX ADMIN — LORATADINE 10 MG: 10 TABLET ORAL at 09:18

## 2019-10-27 RX ADMIN — POTASSIUM CHLORIDE AND SODIUM CHLORIDE: 900; 300 INJECTION, SOLUTION INTRAVENOUS at 21:10

## 2019-10-27 RX ADMIN — POTASSIUM CHLORIDE 10 MEQ: 10 INJECTION, SOLUTION INTRAVENOUS at 10:00

## 2019-10-27 RX ADMIN — LEVOTHYROXINE SODIUM 88 MCG: 88 TABLET ORAL at 07:10

## 2019-10-27 RX ADMIN — POTASSIUM CHLORIDE 10 MEQ: 10 INJECTION, SOLUTION INTRAVENOUS at 10:58

## 2019-10-27 RX ADMIN — METRONIDAZOLE 500 MG: 500 INJECTION, SOLUTION INTRAVENOUS at 19:42

## 2019-10-27 RX ADMIN — POTASSIUM CHLORIDE 10 MEQ: 10 INJECTION, SOLUTION INTRAVENOUS at 11:00

## 2019-10-27 NOTE — PROGRESS NOTES
Bedside and Verbal shift change report given to Lisa (oncoming nurse) by Devorah Stack (offgoing nurse).  Report included the following information SBAR, Kardex, Intake/Output, MAR and Cardiac Rhythm SR.

## 2019-10-27 NOTE — PROGRESS NOTES
[address], [address] [place] [address]    GI PROGRESS NOTE    NAME: Marlin Dave   :  1960   MRN:  [id number]       Subjective:     DOING BETTER, NO MORE BLEEDING  Review of Systems    Constitutional: negative fever, negative chills, negative weight loss  Eyes:   negative visual changes  ENT:   negative sore throat, tongue or lip swelling  Respiratory:  negative cough, negative dyspnea  Cards:  negative for chest pain, palpitations, lower extremity edema  GI:   See HPI  :  negative for frequency, dysuria  Integument:  negative for rash and pruritus  Heme:  negative for easy bruising and gum/nose bleeding  Musculoskel: negative for myalgias,  back pain and muscle weakness  Neuro: negative for headaches, dizziness, vertigo  Psych:  negative for feelings of anxiety, depression         Objective:     VITALS:   Last 24hrs VS reviewed since prior progress note. Most recent are:  Visit Vitals  /90 (BP 1 Location: Right arm, BP Patient Position: At rest)   Pulse 92   Temp 97.9 °F (36.6 °C)   Resp 18   Ht 5' 3\" (1.6 m)   Wt 66.8 kg (147 lb 4.3 oz)   SpO2 99%   Breastfeeding? No   BMI 26.09 kg/m²       Intake/Output Summary (Last 24 hours) at 10/27/2019 1530  Last data filed at 10/27/2019 1130  Gross per 24 hour   Intake 5284.59 ml   Output 900 ml   Net 4384.59 ml     PHYSICAL EXAM:  General: WD, WN. Alert, cooperative, no acute distress    HEENT: NC, Atraumatic. PERRLA, EOMI. Anicteric sclerae. Lungs:  CTA Bilaterally. No Wheezing/Rhonchi/Rales. Heart:  Regular  rhythm,  No murmur (), No Rubs, No Gallops  Abdomen: Soft, Non distended, Non tender.  +Bowel sounds, no HSM  Extremities: No c/c/e  Neurologic:  CN 2-12 gi, Alert and oriented X 3. No acute neurological distress   Psych:   Good insight. Not anxious nor agitated.     Lab Data Reviewed:   Recent Labs     10/27/19  0353 10/26/19  0408   WBC 14.7* 15.8*   HGB 10.8* 12.1   HCT 33.3* 37.1    236     Recent Labs 10/27/19  0353 10/26/19  0408 10/25/19  0511    138 136   K 3.2* 3.0* 2.8*   * 105 99   CO2 24 25 28   BUN 1* 3* 10   CREA 0.61 0.66 0.97   * 109* 154*   PHOS  --   --  3.2   CA 7.6* 7.8* 8.7     Recent Labs     10/26/19  0408 10/25/19  0511 10/24/19  2102   SGOT 14* 17 22   AP 46 56 65   TP 5.6* 7.1 7.7   ALB 2.7* 3.7 4.2   GLOB 2.9 3.4 3.5   LPSE  --  99 132       ________________________________________________________________________       Assessment:   · Colitis suspect infectious  · Diarrhea, improving     Patient Active Problem List   Diagnosis Code    Acute colitis K52.9     Plan:   · Continue on supportive care  · Follow stool studies  · Dr Herrmann Reasons Will follow     Signed By: Mahogany Solomon MD     10/27/2019

## 2019-10-27 NOTE — PROGRESS NOTES
Hospitalist Progress Note  Emmy Recio MD  Answering service: [phone] OR [phone] from in house phone      Date of Service:  10/27/2019  NAME:  Don Moses                                                         :  1960                                               MRN:  [id number]    Admission summary   Don Moses is a 64year old female presented to the [place] ED for bloody diarrhea and colitis. CT abdomen showed evidence of colitis. She is current on chemotherapy for metastatic breast cancer. Subjective/interval history    F/u for Colitis  -Still with frequent diarrhea,otherwise abdominal is improving. No fever. Tolerating full liquid diet. Assessment and plan  Acute likely infectious colitis,clinically improving except for the persisting frequent  Diarrhea   -CT abdomen and pelvis showed colonic wall thickening noted in the descending colon and proximal sigmoid colon with pericolonic inflammatory stranding most predominant adjacent to the descending colon. There is no abscess or drainable fluid collection   -Continue supportive therapy. IV antibiotics. C diff negative  -She denied recent exposure to antibiotics or hospitalization   -GI follwoing    Moderate hypokalemia due to GI loss: continue replacement with iv  -she could not tolerate oral potassium     Type II DM. A1C 7. 6. She attributes to chemotherapy side effect. She was just started on metformin She is refusing correction insulin       Metastatic breat ca,on piqray        Diet:clear liquid diet  Code status:full  SCD  Disposition:home   Plan of care discussed with:patient. Current facility administered and prior to admit medications reviewed. x         Review of Systems:  A comprehensive review of systems was negative except for that written in the HPI.         PHYSICAL EXAM:  O:  Visit Vitals  /88 (BP 1 Location: Right arm, BP Patient Position: Sitting)   Pulse 99   Temp 97.5 °F (36.4 °C) Resp 16   Ht 5' 3\" (1.6 m)   Wt 66.8 kg (147 lb 4.3 oz)   SpO2 100%   Breastfeeding? No   BMI 26.09 kg/m²     GENERAL:  Alert, oriented, cooperative, no apparent distress  HEENT:  Normocephalic, atraumatic, non icteric sclerae, non pallor conjuctivae, EOMs intact, PERRLA. NECK: Supple, trachea midline, no adenopathy, no thyromegally or tenderness, no carotid bruit and no JVD. LUNGS:   Vesicular breath sounds bilaterally, no added sounds. HEART:   S1 and S2 well heard,RRR,  no murmur, click, rub or gallop. ABDOMEB:   Normoactive. LLQ tenderness w/o rebound or guarding,improved. EXTREMETIES:  Atraumatic, acyanotic, no edema  PULSES: 2+ and symmetric all extremities. SKIN:  No rashes or lesions  NEUROLOGY: Alert and oriented to PPT, CNII-XII intact. Motor and sensory exam grossly intact.        Recent labs & imaging reviewed:    Problem List as of 10/27/2019 Date Reviewed: 10/25/2019          Codes Class Noted - Resolved    * (Principal) Acute colitis ICD-10-CM: K52.9  ICD-9-CM: 558.9  10/24/2019 - Present                Mai Cruz MD  Internal Medicine  Date of Service: 10/27/2019

## 2019-10-27 NOTE — ROUTINE PROCESS
Bedside and Verbal shift change report given to Lisa (oncoming nurse) by Shreya Nichols (offgoing nurse). Report included the following information SBAR, Kardex, Recent Results and Cardiac Rhythm NSR/ST.

## 2019-10-27 NOTE — PROGRESS NOTES
discussed moving around in room and madera, getting out of bed. Need for IV potassium.     Using call light

## 2019-10-27 NOTE — ROUTINE PROCESS
Bedside and Verbal shift change report given to Tommy (oncoming nurse) by Shawna Kelly (offgoing nurse). Report included the following information SBAR, Kardex, Intake/Output, Recent Results and Cardiac Rhythm NSR.

## 2019-10-28 VITALS
HEART RATE: 96 BPM | RESPIRATION RATE: 18 BRPM | OXYGEN SATURATION: 100 % | DIASTOLIC BLOOD PRESSURE: 75 MMHG | BODY MASS INDEX: 25.98 KG/M2 | SYSTOLIC BLOOD PRESSURE: 135 MMHG | HEIGHT: 63 IN | WEIGHT: 146.61 LBS | TEMPERATURE: 98.1 F

## 2019-10-28 LAB
ANION GAP SERPL CALC-SCNC: 3 MMOL/L (ref 5–15)
BASOPHILS # BLD: 0 K/UL (ref 0–0.1)
BASOPHILS # BLD: 0 K/UL (ref 0–0.1)
BASOPHILS NFR BLD: 0 % (ref 0–1)
BASOPHILS NFR BLD: 0 % (ref 0–1)
BUN SERPL-MCNC: 2 MG/DL (ref 6–20)
BUN/CREAT SERPL: 3 (ref 12–20)
CALCIUM SERPL-MCNC: 8 MG/DL (ref 8.5–10.1)
CHLORIDE SERPL-SCNC: 112 MMOL/L (ref 97–108)
CO2 SERPL-SCNC: 25 MMOL/L (ref 21–32)
CREAT SERPL-MCNC: 0.65 MG/DL (ref 0.55–1.02)
DIFFERENTIAL METHOD BLD: ABNORMAL
DIFFERENTIAL METHOD BLD: ABNORMAL
EOSINOPHIL # BLD: 0 K/UL (ref 0–0.4)
EOSINOPHIL # BLD: 0 K/UL (ref 0–0.4)
EOSINOPHIL NFR BLD: 0 % (ref 0–7)
EOSINOPHIL NFR BLD: 0 % (ref 0–7)
ERYTHROCYTE [DISTWIDTH] IN BLOOD BY AUTOMATED COUNT: 14.2 % (ref 11.5–14.5)
ERYTHROCYTE [DISTWIDTH] IN BLOOD BY AUTOMATED COUNT: 14.3 % (ref 11.5–14.5)
GLUCOSE BLD STRIP.AUTO-MCNC: 111 MG/DL (ref 65–100)
GLUCOSE BLD STRIP.AUTO-MCNC: 94 MG/DL (ref 65–100)
GLUCOSE SERPL-MCNC: 136 MG/DL (ref 65–100)
HCT VFR BLD AUTO: 33.3 % (ref 35–47)
HCT VFR BLD AUTO: 34.8 % (ref 35–47)
HGB BLD-MCNC: 10.8 G/DL (ref 11.5–16)
HGB BLD-MCNC: 11.1 G/DL (ref 11.5–16)
IMM GRANULOCYTES # BLD AUTO: 0.1 K/UL (ref 0–0.04)
IMM GRANULOCYTES # BLD AUTO: 0.2 K/UL (ref 0–0.04)
IMM GRANULOCYTES NFR BLD AUTO: 1 % (ref 0–0.5)
IMM GRANULOCYTES NFR BLD AUTO: 1 % (ref 0–0.5)
LYMPHOCYTES # BLD: 1.2 K/UL (ref 0.8–3.5)
LYMPHOCYTES # BLD: 1.4 K/UL (ref 0.8–3.5)
LYMPHOCYTES NFR BLD: 11 % (ref 12–49)
LYMPHOCYTES NFR BLD: 12 % (ref 12–49)
MCH RBC QN AUTO: 30.2 PG (ref 26–34)
MCH RBC QN AUTO: 30.6 PG (ref 26–34)
MCHC RBC AUTO-ENTMCNC: 31.9 G/DL (ref 30–36.5)
MCHC RBC AUTO-ENTMCNC: 32.4 G/DL (ref 30–36.5)
MCV RBC AUTO: 94.3 FL (ref 80–99)
MCV RBC AUTO: 94.8 FL (ref 80–99)
MONOCYTES # BLD: 0.7 K/UL (ref 0–1)
MONOCYTES # BLD: 0.7 K/UL (ref 0–1)
MONOCYTES NFR BLD: 6 % (ref 5–13)
MONOCYTES NFR BLD: 6 % (ref 5–13)
NEUTS SEG # BLD: 10 K/UL (ref 1.8–8)
NEUTS SEG # BLD: 9.1 K/UL (ref 1.8–8)
NEUTS SEG NFR BLD: 81 % (ref 32–75)
NEUTS SEG NFR BLD: 82 % (ref 32–75)
NRBC # BLD: 0 K/UL (ref 0–0.01)
NRBC # BLD: 0 K/UL (ref 0–0.01)
NRBC BLD-RTO: 0 PER 100 WBC
NRBC BLD-RTO: 0 PER 100 WBC
PLATELET # BLD AUTO: 238 K/UL (ref 150–400)
PLATELET # BLD AUTO: 257 K/UL (ref 150–400)
PMV BLD AUTO: 9.5 FL (ref 8.9–12.9)
PMV BLD AUTO: 9.6 FL (ref 8.9–12.9)
POTASSIUM SERPL-SCNC: 4.2 MMOL/L (ref 3.5–5.1)
RBC # BLD AUTO: 3.53 M/UL (ref 3.8–5.2)
RBC # BLD AUTO: 3.67 M/UL (ref 3.8–5.2)
SERVICE CMNT-IMP: ABNORMAL
SERVICE CMNT-IMP: NORMAL
SODIUM SERPL-SCNC: 140 MMOL/L (ref 136–145)
WBC # BLD AUTO: 11.1 K/UL (ref 3.6–11)
WBC # BLD AUTO: 12.3 K/UL (ref 3.6–11)

## 2019-10-28 PROCEDURE — 82962 GLUCOSE BLOOD TEST: CPT

## 2019-10-28 PROCEDURE — 80048 BASIC METABOLIC PNL TOTAL CA: CPT

## 2019-10-28 PROCEDURE — 74011250636 HC RX REV CODE- 250/636: Performed by: INTERNAL MEDICINE

## 2019-10-28 PROCEDURE — 36415 COLL VENOUS BLD VENIPUNCTURE: CPT

## 2019-10-28 PROCEDURE — 74011250637 HC RX REV CODE- 250/637: Performed by: INTERNAL MEDICINE

## 2019-10-28 PROCEDURE — 74011250636 HC RX REV CODE- 250/636: Performed by: HOSPITALIST

## 2019-10-28 PROCEDURE — 85025 COMPLETE CBC W/AUTO DIFF WBC: CPT

## 2019-10-28 RX ORDER — SODIUM CHLORIDE, SODIUM LACTATE, POTASSIUM CHLORIDE, CALCIUM CHLORIDE 600; 310; 30; 20 MG/100ML; MG/100ML; MG/100ML; MG/100ML
75 INJECTION, SOLUTION INTRAVENOUS CONTINUOUS
Status: DISCONTINUED | OUTPATIENT
Start: 2019-10-28 | End: 2019-10-28 | Stop reason: HOSPADM

## 2019-10-28 RX ORDER — PROCHLORPERAZINE MALEATE 5 MG
5 TABLET ORAL
Qty: 20 TAB | Refills: 0 | Status: ON HOLD | OUTPATIENT
Start: 2019-10-28 | End: 2022-01-01

## 2019-10-28 RX ORDER — LEVOFLOXACIN 500 MG/1
500 TABLET, FILM COATED ORAL DAILY
Qty: 7 TAB | Refills: 0 | Status: SHIPPED | OUTPATIENT
Start: 2019-10-28 | End: 2019-11-04

## 2019-10-28 RX ORDER — METRONIDAZOLE 500 MG/1
500 TABLET ORAL 2 TIMES DAILY
Qty: 14 TAB | Refills: 0 | Status: SHIPPED | OUTPATIENT
Start: 2019-10-28 | End: 2019-10-28 | Stop reason: SDUPTHER

## 2019-10-28 RX ORDER — METRONIDAZOLE 500 MG/1
500 TABLET ORAL 2 TIMES DAILY
Qty: 14 TAB | Refills: 0 | Status: SHIPPED | OUTPATIENT
Start: 2019-10-28 | End: 2019-11-04

## 2019-10-28 RX ADMIN — Medication 10 ML: at 14:00

## 2019-10-28 RX ADMIN — POTASSIUM CHLORIDE AND SODIUM CHLORIDE: 900; 300 INJECTION, SOLUTION INTRAVENOUS at 04:58

## 2019-10-28 RX ADMIN — METRONIDAZOLE 500 MG: 500 INJECTION, SOLUTION INTRAVENOUS at 08:57

## 2019-10-28 RX ADMIN — FAMOTIDINE 20 MG: 20 TABLET ORAL at 08:57

## 2019-10-28 RX ADMIN — LEVOFLOXACIN 750 MG: 5 INJECTION, SOLUTION INTRAVENOUS at 04:45

## 2019-10-28 RX ADMIN — LEVOTHYROXINE SODIUM 88 MCG: 88 TABLET ORAL at 08:01

## 2019-10-28 RX ADMIN — LORATADINE 10 MG: 10 TABLET ORAL at 08:57

## 2019-10-28 NOTE — PROGRESS NOTES
[address]  [address] [facility], [address] [facility]       GI PROGRESS NOTE  Will Codey Hanson  [phone] office  [phone] NP/PA in-hospital cell phone M-F until 4:30PM  After 5PM or on weekends, please call  for physician on call      NAME: Radha Ferrari   :  1960   MRN:  [id number]       Subjective:   Patient is sitting in the chair.  is at the bedside. She reports improvement in diarrhea and abdominal pain. No further bleeding. Objective:     VITALS:   Last 24hrs VS reviewed since prior progress note. Most recent are:  Visit Vitals  /75 (BP 1 Location: Right arm, BP Patient Position: At rest)   Pulse 96   Temp 98.1 °F (36.7 °C)   Resp 18   Ht 5' 3\" (1.6 m)   Wt 66.5 kg (146 lb 9.7 oz)   SpO2 100%   Breastfeeding? No   BMI 25.97 kg/m²       PHYSICAL EXAM:  General: Cooperative, no acute distress    Neurologic:  Alert and oriented  HEENT: EOMI, no scleral icterus   Lungs:  CTA bilaterally anteriorly  Heart:  S1 S2  Abdomen: Soft, non-distended, no tenderness, no guarding, no rebound. +Bowel sounds.    Extremities: Warm  Psych:   Not anxious or agitated    Lab Data Reviewed:     Recent Results (from the past 24 hour(s))   GLUCOSE, POC    Collection Time: 10/27/19  4:52 PM   Result Value Ref Range    Glucose (POC) 112 (H) 65 - 100 mg/dL    Performed by Dennis Gilford S (CON)    GLUCOSE, POC    Collection Time: 10/27/19 10:02 PM   Result Value Ref Range    Glucose (POC) 101 (H) 65 - 100 mg/dL    Performed by Mario Smith    CBC WITH AUTOMATED DIFF    Collection Time: 10/28/19  4:54 AM   Result Value Ref Range    WBC 12.3 (H) 3.6 - 11.0 K/uL    RBC 3.53 (L) 3.80 - 5.20 M/uL    HGB 10.8 (L) 11.5 - 16.0 g/dL    HCT 33.3 (L) 35.0 - 47.0 %    MCV 94.3 80.0 - 99.0 FL    MCH 30.6 26.0 - 34.0 PG    MCHC 32.4 30.0 - 36.5 g/dL    RDW 14.3 11.5 - 14.5 %    PLATELET 306 286 - 405 K/uL    MPV 9.5 8.9 - 12.9 FL    NRBC 0.0 0  WBC    ABSOLUTE NRBC 0.00 0.00 - 0.01 K/uL    NEUTROPHILS 81 (H) 32 - 75 %    LYMPHOCYTES 12 12 - 49 %    MONOCYTES 6 5 - 13 %    EOSINOPHILS 0 0 - 7 %    BASOPHILS 0 0 - 1 %    IMMATURE GRANULOCYTES 1 (H) 0.0 - 0.5 %    ABS. NEUTROPHILS 10.0 (H) 1.8 - 8.0 K/UL    ABS. LYMPHOCYTES 1.4 0.8 - 3.5 K/UL    ABS. MONOCYTES 0.7 0.0 - 1.0 K/UL    ABS. EOSINOPHILS 0.0 0.0 - 0.4 K/UL    ABS. BASOPHILS 0.0 0.0 - 0.1 K/UL    ABS. IMM. GRANS. 0.2 (H) 0.00 - 0.04 K/UL    DF AUTOMATED     GLUCOSE, POC    Collection Time: 10/28/19  5:09 AM   Result Value Ref Range    Glucose (POC) 94 65 - 100 mg/dL    Performed by Diana Alvarez    CBC WITH AUTOMATED DIFF    Collection Time: 10/28/19 10:47 AM   Result Value Ref Range    WBC 11.1 (H) 3.6 - 11.0 K/uL    RBC 3.67 (L) 3.80 - 5.20 M/uL    HGB 11.1 (L) 11.5 - 16.0 g/dL    HCT 34.8 (L) 35.0 - 47.0 %    MCV 94.8 80.0 - 99.0 FL    MCH 30.2 26.0 - 34.0 PG    MCHC 31.9 30.0 - 36.5 g/dL    RDW 14.2 11.5 - 14.5 %    PLATELET 805 706 - 194 K/uL    MPV 9.6 8.9 - 12.9 FL    NRBC 0.0 0  WBC    ABSOLUTE NRBC 0.00 0.00 - 0.01 K/uL    NEUTROPHILS 82 (H) 32 - 75 %    LYMPHOCYTES 11 (L) 12 - 49 %    MONOCYTES 6 5 - 13 %    EOSINOPHILS 0 0 - 7 %    BASOPHILS 0 0 - 1 %    IMMATURE GRANULOCYTES 1 (H) 0.0 - 0.5 %    ABS. NEUTROPHILS 9.1 (H) 1.8 - 8.0 K/UL    ABS. LYMPHOCYTES 1.2 0.8 - 3.5 K/UL    ABS. MONOCYTES 0.7 0.0 - 1.0 K/UL    ABS. EOSINOPHILS 0.0 0.0 - 0.4 K/UL    ABS. BASOPHILS 0.0 0.0 - 0.1 K/UL    ABS. IMM.  GRANS. 0.1 (H) 0.00 - 0.04 K/UL    DF AUTOMATED     METABOLIC PANEL, BASIC    Collection Time: 10/28/19 10:47 AM   Result Value Ref Range    Sodium 140 136 - 145 mmol/L    Potassium 4.2 3.5 - 5.1 mmol/L    Chloride 112 (H) 97 - 108 mmol/L    CO2 25 21 - 32 mmol/L    Anion gap 3 (L) 5 - 15 mmol/L    Glucose 136 (H) 65 - 100 mg/dL    BUN 2 (L) 6 - 20 MG/DL    Creatinine 0.65 0.55 - 1.02 MG/DL    BUN/Creatinine ratio 3 (L) 12 - 20      GFR est AA >60 >60 ml/min/1.73m2    GFR est non-AA >60 >60 ml/min/1.73m2    Calcium 8.0 (L) 8.5 - 10.1 MG/DL   GLUCOSE, POC    Collection Time: 10/28/19 11:58 AM   Result Value Ref Range    Glucose (POC) 111 (H) 65 - 100 mg/dL    Performed by Elie Lott        Assessment:   · Diarrhea, improving: CT abdomen/pelvis with IV and oral contrast (10/24/19): findings consistent with moderate to severe colitis predominantly affecting the descending and transverse colon with pericolonic inflammatory stranding, no abscess or drainable fluid collection; minimal hyperemia of terminal ileum. Hemoccult stool positive. WBC stool > 20, stool panel negative, C. difficile negative, ova & parasites pending. · Acute colitis, suspected infectious     Patient Active Problem List   Diagnosis Code    Acute colitis K52.9     Plan:   · Continue antibiotics  · Plan for discharge today noted. Discussed with Dr. Bianka Price. · CT findings and stool studies were discussed in detail with the patient and her   · Follow up in the office in 2-3 weeks     Signed By: Rebecca Pina     10/28/2019  4:30 PM       GI Attending: Agree with above plan. Continue antibiotics upon discharged. Plan for office visit in 2-3 weeks. Chuck Torrez MD

## 2019-10-28 NOTE — PROGRESS NOTES
0800 Bedside and Verbal shift change report given to Ramakrishna (oncoming nurse) by Lily Villarreal (offgoing nurse). Report included the following information SBAR, Kardex, Intake/Output, MAR and Recent Results.

## 2019-10-28 NOTE — PROGRESS NOTES
Hospitalist Progress Note  Qian Shay MD  Answering service: [phone] OR [phone] from in house phone      Date of Service:  10/28/2019  NAME:  Barbie Harding                                                         :  1960                                               MRN:  [id number]    Admission summary   Barbie Harding is a 64year old female presented to the [place] ED for bloody diarrhea and colitis. CT abdomen showed evidence of colitis. She is current on chemotherapy for metastatic breast cancer. Subjective/interval history    F/u for Colitis  -Diarrhea slowed down. She had total of 4 BM since 6pm yesterday. No N/V,abdominal pain only when she presses on her abdomen. Assessment and plan  Acute likely infectious colitis,clinically improving except for the persisting frequent  Diarrhea   -CT abdomen and pelvis showed colonic wall thickening noted in the descending colon and proximal sigmoid colon with pericolonic inflammatory stranding most predominant adjacent to the descending colon. There is no abscess or drainable fluid collection   -She denied recent exposure to antibiotics or hospitalization   -Continue supportive therapy. IV antibiotics. C diff negative  -Diarrhea improved,wbc almost normal.Toleraing GI lite diet. -GI following,anticipate d/c once GI see her     Moderate hypokalemia due to GI loss: corrected. Type II DM. A1C 7. 6. She attributes to chemotherapy side effect. She was just started on metformin She is refusing correction insulin       Metastatic breat ca,on piqray        Diet:clear liquid diet  Code status:full  SCD  Disposition:home   Plan of care discussed with:patient. Current facility administered and prior to admit medications reviewed. x         Review of Systems:  A comprehensive review of systems was negative except for that written in the HPI.         PHYSICAL EXAM:  O:  Visit Vitals  /71 (BP 1 Location: Right arm, BP Patient Position: At rest)   Pulse 92   Temp 98.7 °F (37.1 °C)   Resp 18   Ht 5' 3\" (1.6 m)   Wt 66.5 kg (146 lb 9.7 oz)   SpO2 97%   Breastfeeding? No   BMI 25.97 kg/m²     GENERAL:  Alert, oriented, cooperative, no apparent distress  HEENT:  Normocephalic, atraumatic, non icteric sclerae, non pallor conjuctivae, EOMs intact, PERRLA. NECK: Supple, trachea midline, no adenopathy, no thyromegally or tenderness, no carotid bruit and no JVD. LUNGS:   Vesicular breath sounds bilaterally, no added sounds. HEART:   S1 and S2 well heard,RRR,  no murmur, click, rub or gallop. ABDOMEB:   Normoactive. Very minimal llq tenderness w/o rebound or guarding. EXTREMETIES:  Atraumatic, acyanotic, no edema  PULSES: 2+ and symmetric all extremities. SKIN:  No rashes or lesions  NEUROLOGY: Alert and oriented to PPT, CNII-XII intact. Motor and sensory exam grossly intact.        Recent labs & imaging reviewed:    Problem List as of 10/28/2019 Date Reviewed: 10/25/2019          Codes Class Noted - Resolved    * (Principal) Acute colitis ICD-10-CM: K52.9  ICD-9-CM: 558.9  10/24/2019 - Present                Migdalia Ramos MD  Internal Medicine  Date of Service: 10/28/2019

## 2019-10-28 NOTE — DISCHARGE SUMMARY
Discharge Summary       PATIENT ID: Mihaela Cui  MRN: [id number]   YOB: 1960    DATE OF ADMISSION: 10/24/2019  8:51 PM    DATE OF DISCHARGE: 10/28/2019  PRIMARY CARE PROVIDER: Matt Paul NP     ATTENDING PHYSICIAN: Tiff Paz MD  DISCHARGING PROVIDER: Tiff Paz MD    To contact this individual call [phone] and ask the  to page. If unavailable ask to be transferred the Adult Hospitalist Department. CONSULTATIONS: IP CONSULT TO GASTROENTEROLOGY  IP CONSULT TO HEMATOLOGY    PROCEDURES/SURGERIES: * No surgery found *    ADMITTING DIAGNOSES & HOSPITAL COURSE:   Acute likely infectious colitis,clinically improved. C diff negative. Diarrhea significant better,4 bm since 6 pm yesterday. WBC normalized. Afebrile. Toleraitng Gi lite diet. Discharged home on Levaquin and flagyl. -CT abdomen and pelvis showed colonic wall thickening noted in the descending colon and proximal sigmoid colon with pericolonic inflammatory stranding most predominant adjacent to the descending colon.      Moderate hypokalemia due to GI loss: corrected.     Type II DM. A1C 7. 6. She attributes to chemotherapy side effect. She was just started on metformin She is refusing correction insulin         Metastatic breat ca,on piqray                    DISCHARGE MEDICATIONS:  Current Discharge Medication List      START taking these medications    Details   metroNIDAZOLE (FLAGYL) 500 mg tablet Take 1 Tab by mouth two (2) times a day for 7 days. Qty: 14 Tab, Refills: 0      levoFLOXacin (LEVAQUIN) 500 mg tablet Take 1 Tab by mouth daily for 7 days. Qty: 7 Tab, Refills: 0         CONTINUE these medications which have CHANGED    Details   prochlorperazine (COMPAZINE) 5 mg tablet Take 1 Tab by mouth every six (6) hours as needed for Nausea. Qty: 20 Tab, Refills: 0         CONTINUE these medications which have NOT CHANGED    Details   metFORMIN (GLUCOPHAGE) 500 mg tablet Take 500 mg by mouth daily.       alpelisib (PIQRAY) 300 mg/day (150 mg x 2) tab Take 300 mg by mouth daily. loratadine (CLARITIN) 10 mg tablet Take 10 mg by mouth daily. famotidine (PEPCID) 10 mg tablet Take 20 mg by mouth daily. estradiol (ESTRING) 2 mg (7.5 mcg /24 hour) vaginal ring Insert 2 mg into vagina now. follow package directions      hydroCHLOROthiazide (HYDRODIURIL) 25 mg tablet Take 25 mg by mouth daily. levothyroxine (SYNTHROID) 88 mcg tablet Take 88 mcg by mouth Daily (before breakfast). ondansetron (ZOFRAN ODT) 4 mg disintegrating tablet Take 1 Tab by mouth every eight (8) hours as needed for Nausea. Qty: 10 Tab, Refills: 0             PENDING TEST RESULTS:   At the time of discharge the following test results are still pending: none    FOLLOW UP APPOINTMENTS:    Follow-up Information     Follow up With Specialties Details Why Contact Info    Patience Morgan NP Nurse Practitioner   [address] [place] [address]      Hina Lester MD Gastroenterology  Please call and schedule appointment  [address] [facility]  [phone]             ADDITIONAL CARE RECOMMENDATIONS:     DIET: Regular Diet, Cardiac Diet and Diabetic Diet    ACTIVITY: Activity as tolerated    WOUND CARE: NA    EQUIPMENT needed: NA          NOTIFY YOUR PHYSICIAN FOR ANY OF THE FOLLOWING:   Fever over 101 degrees for 24 hours. Chest pain, shortness of breath, fever, chills, nausea, vomiting, diarrhea, change in mentation, falling, weakness, bleeding. Severe pain or pain not relieved by medications. Or, any other signs or symptoms that you may have questions about.     DISPOSITION:   x Home With:   OT  PT  HH  RN       SNF(Name)    Inpatient Rehab(name)    Independent/assisted living(name)    Hospice    Other:       PATIENT CONDITION AT DISCHARGE:     Functional status        Poor     Deconditioned    x Independent    Cognition       x Lucid Forgetful    Dementia   Catheter/Lines(indications)     Peraza    PICC    PEG   x None   Code status     x Full    DNR       PHYSICAL EXAMINATION AT DISCHARGE:     Visit Vitals  /75 (BP 1 Location: Right arm, BP Patient Position: At rest)   Pulse 96   Temp 98.1 °F (36.7 °C)   Resp 18   Ht 5' 3\" (1.6 m)   Wt 66.5 kg (146 lb 9.7 oz)   SpO2 100%   Breastfeeding? No   BMI 25.97 kg/m²      O2 Device: Room air    Temp (24hrs), Av.4 °F (36.9 °C), Min:97.6 °F (36.4 °C), Max:99.3 °F (37.4 °C)    10/28 0701 - 10/28 190  In: 2941.7 [I.V.:2941.7]  Out: -    10/26 1901 - 10/28 0700  In: 3903.8 [P.O.:600; I.V.:3303.8]  Out: 1350 [Urine:1350]    GENERAL:  Alert, oriented, cooperative, no apparent distress  HEENT:  Normocephalic, atraumatic, non icteric sclerae, non pallor conjuctivae, EOMs intact, PERRLA. NECK: Supple, trachea midline, no adenopathy, no thyromegally or tenderness, no carotid bruit and no JVD. LUNGS:   Vesicular breath sounds bilaterally, no added sounds. HEART:   S1 and S2 well heard,RRR,  no murmur, click, rub or gallop. ABDOMEB:   Soft, non-tender. Normoactive bowel sounds. No masses,  No organomegaly. EXTREMETIES:  Atraumatic, acyanotic, no edema  PULSES: 2+ and symmetric all extremities. SKIN:  No rashes or lesions  NEUROLOGY: Alert and oriented to PPT, CNII-XII intact. Motor and sensory exam grossly intact. CHRONIC MEDICAL DIAGNOSES:  Problem List as of 10/28/2019 Date Reviewed: 10/25/2019          Codes Class Noted - Resolved    * (Principal) Acute colitis ICD-10-CM: K52.9  ICD-9-CM: 558.9  10/24/2019 - Present              Greater than 30 minutes were spent with the patient on counseling and coordination of care    Signed:    Siomara Cassidy MD  10/28/2019  4:07 PM

## 2019-10-28 NOTE — CONSULTS
[address]    Name:  José Luis Bright  MR#:  [id number]  :  1960  ACCOUNT #:  [de-identified]  DATE OF SERVICE:  10/28/2019      HISTORY OF PRESENT ILLNESS:  The patient is a 61-year-old woman with metastatic breast cancer who is cared for by Dr. Huong Chilel at [facility]. We are asked to see her for consultation following her admission for bloody diarrhea. This patient was admitted to [facility] on 10/25/19 with abdominal pain and bloody diarrhea. She was cultured and started on IV antibiotics and has had improvement in her symptoms. CT scan of the abdomen performed upon admission did show moderate-to-severe colitis in the descending and transverse colon as well as evidence for osseous metastatic disease compared to a study done in 2018. Her past medical history is significant for metastatic NE-positive breast cancer. She was diagnosed with metastatic disease in the skeleton in 2018, 12 years out from her original breast surgery. She had been treated initially with adjuvant hormonal treatment, and since her diagnosis of metastatic disease, was treated with letrozole between 2018 and 2019, and then on Kisqali 2018 through 2019. She is now on Piqray and has been on this drug since 2019. PAST MEDICAL HISTORY:  Also includes:  1. Fractured left sacrum. 2.  Hypothyroidism. PAST SURGICAL HISTORY:  Includes:  1. Bilateral mastectomy 2006. 2.  Tonsillectomy. FAMILY HISTORY:  She has two aunts on her mother's side who had breast and ovarian cancer and their daughters were BRCA2 positive. SOCIAL HISTORY:  She is . She has never smoked. Drinks alcohol occasionally. REVIEW OF SYSTEMS:  As noted above, otherwise noncontributory. PHYSICAL EXAMINATION  GENERAL:  She is a pleasant, well-developed, well-nourished female in no apparent distress.   VITAL SIGNS:  Her temperature is 98, pulse 92, /71, respirations 18, O2 sat 97% on room air. HEENT:  EOMI, PERRLA. Oropharynx without lesions. NECK:  Supple. LUNGS:  Clear. CARDIAC:  Regular rate and rhythm. No murmur. ABDOMEN:  Soft, nontender. EXTREMITIES:  No edema. NEUROLOGIC:  A and O x3. Grossly nonfocal.    IMPRESSION:  Metastatic breast cancer. This patient has a PIK3CA mutation with hormone receptor positive, HER-2 negative advanced cancer and is taking Piqray drug which was approved by the FDA in 05/2019. Since starting this medication, she has had no side effects that she can identify. She did initiate metformin recently and wonders if that medication may have contributed to the bloody diarrhea. Diarrhea is seen in 58% of patients with this medication, nausea in 45%, and stomatitis in 30%. It would make sense to hold [address] for the time being as we await further studies and treat her for possible infectious diarrhea. We will follow closely with you on behalf of [employer].       Carmen Kong MD      [id number]  D:  10/28/2019 13:05  T:  10/28/2019 18:11  JOB #:  [id number]

## 2019-10-28 NOTE — DISCHARGE INSTRUCTIONS
DISCHARGE SUMMARY from Nurse    PATIENT INSTRUCTIONS:    After general anesthesia or intravenous sedation, for 24 hours or while taking prescription Narcotics:  · Limit your activities  · Do not drive and operate hazardous machinery  · Do not make important personal or business decisions  · Do  not drink alcoholic beverages  · If you have not urinated within 8 hours after discharge, please contact your surgeon on call. Report the following to your surgeon:  · Excessive pain, swelling, redness or odor of or around the surgical area  · Temperature over 100.5  · Nausea and vomiting lasting longer than 4 hours or if unable to take medications  · Any signs of decreased circulation or nerve impairment to extremity: change in color, persistent  numbness, tingling, coldness or increase pain  · Any questions    *  Please give a list of your current medications to your Primary Care Provider. *  Please update this list whenever your medications are discontinued, doses are      changed, or new medications (including over-the-counter products) are added. *  Please carry medication information at all times in case of emergency situations. These are general instructions for a healthy lifestyle:    No smoking/ No tobacco products/ Avoid exposure to second hand smoke  Surgeon General's Warning:  Quitting smoking now greatly reduces serious risk to your health. Obesity, smoking, and sedentary lifestyle greatly increases your risk for illness    A healthy diet, regular physical exercise & weight monitoring are important for maintaining a healthy lifestyle    You may be retaining fluid if you have a history of heart failure or if you experience any of the following symptoms:  Weight gain of 3 pounds or more overnight or 5 pounds in a week, increased swelling in our hands or feet or shortness of breath while lying flat in bed.   Please call your doctor as soon as you notice any of these symptoms; do not wait until your next office visit. Dear Migel Sheffield,    Thank you for choosing [facility] for your healthcare needs. We strive to provide EXCELLENT care to you and your family. In an effort to explain clearly why you were here in the hospital, I've  written a very brief summary below. Other details including formal diagnosis, medication changes, and follow up appointment recommendations can also be found in this packet. You were admitted for colitis,likely infectious. You have improved with intravenous fluids and with administration of antibiotics,metronidazole and levofloxacin. You are prescribed same antibiotics to take for a week;electronic prescription is sent to your A pharmacy at [facility], [place], [address] [facility]. Patient Education        Oral Rehydration: Care Instruction  Dehydration occurs when your body loses too much water. This can happen if you do not drink enough fluids or lose a lot of fluid due to diarrhea, vomiting, or sweating. Being dehydrated can cause health problems and can even be life-threatening. To replace lost fluids, you need to drink liquid that contains special chemicals called electrolytes. Electrolytes keep your body working well. Plain water does not have electrolytes. You also need to rest to prevent more fluid loss. Replacing water and electrolytes (oral rehydration) completely takes about 36 hours. But you should feel better within a few hours. Follow-up care is a key part of your treatment and safety. Be sure to make and go to all appointments, and call your doctor if you are having problems. It's also a good idea to know your test results and keep a list of the medicines you take. How can you care for yourself at home? · Take frequent sips of a drink such as Gatorade, Powerade, or other rehydration drinks that your doctor suggests. These replace both fluid and important chemicals (electrolytes) you need for balance in your blood.   · Drink 2 quarts of cool liquid over 2 to 4 hours. You should have at least 10 glasses of liquid a day to replace lost fluid. If you have kidney, heart, or liver disease and have to limit fluids, talk with your doctor before you increase the amount of fluids you drink. · Make your own drink. Measure everything carefully. The drink may not work well or may even be harmful if the amounts are off. ? 1 quart water  ? ½ teaspoon salt  ? 6 teaspoons sugar  · Do not drink liquid with caffeine, such as coffee and michael. · Do not drink any alcohol. It can make you dehydrated. · Drink plenty of fluids, enough so that your urine is light yellow or clear like water. If you have kidney, heart, or liver disease and have to limit fluids, talk with your doctor before you increase the amount of fluids you drink. When should you call for help? Call 911 anytime you think you may need emergency care. For example, call if:    · You have signs of severe dehydration, such as:  ? You are confused or unable to stay awake.  ? You passed out (lost consciousness).    Call your doctor now or seek immediate medical care if:    · You still have signs of dehydration. You have sunken eyes and a dry mouth, and you pass only a little dark urine.     · You are dizzy or lightheaded, or you feel like you may faint.     · You are not able to keep down fluids.    Watch closely for changes in your health, and be sure to contact your doctor if:    · You do not get better as expected. Where can you learn more? Go to http://ej-rayo.info/. Enter I040 in the search box to learn more about \"Oral Rehydration: Care Instructions. \"  Current as of: June 26, 2019  Content Version: 12.2  © 6722-4706 [employer]. Care instructions adapted under license by [employer] (which disclaims liability or warranty for this information).  If you have questions about a medical condition or this instruction, always ask your healthcare professional. [employer] disclaims any warranty or liability for your use of this information. Symptom and signs to look for: recurrence and worsening of abdominal pain,abdominal distension,nausea,vomiitng,worsening of diarrhea and if you experience any of these ,go to the nearest emergency room. You also received care from specialist physicians in the following specialties:GASTROENTEROLOGY and HEMATOLOGY    Check with your oncologist when it is safe to go back on your chemoimmunotherapy      Patient Education      Metronidazole (Flagyl, Flagyl 375, Flagyl ER) - (By mouth)   Why this medicine is used:   Treats bacterial infections. Contact a nurse or doctor right away if you have:  · Confusion, drowsiness, clumsiness, trouble talking  · Seizures  · Fever, headache, loss of appetite, nausea or vomiting  · Dizziness, problems with muscle control, stiff neck or back, shakiness  · Numbness, tingling, or burning pain in your hands, arms, legs, or feet     Common side effects:  · Vaginal itching, vaginal yeast infection (women)  · Nausea, stomach discomfort, unusual or unpleasant taste in your mouth  · Headache, rash  © 2017 "[employer] [address] is for End User's use only and may not be sold, redistributed or otherwise used for commercial purposes. Patient Education      Levofloxacin (Levaquin, Levaquin Leva-ofelia) - (By mouth)   Why this medicine is used:   Treats infections.   Contact a nurse or doctor right away if you have:  · Blistering, peeling, red skin rash  · Fast, slow, or uneven heartbeat; lightheadedness or fainting  · Dark urine or pale stools, loss of appetite, stomach pain, yellow skin or eyes  · Severe or bloody diarrhea  · Pain, stiffness, swelling, or bruises around your ankle, leg, shoulder, or other joint     Common side effects:  · Mild nausea, vomiting, diarrhea  · Mild headache  © 2017 [employer] Information is for End User's use only and may not be sold, redistributed or otherwise used for commercial purposes. Remember that it is important for you to take your medications exactly as they are prescribed. It is helpful to keep a list of your medication with the names, dosages, and times to be taken in your wallet. Make sure to also see your primary care doctor for follow-up. Bring these papers with you and be sure to review your medication list with your doctor. I cannot stress the importance of follow up enough. I've included the information for your follow-up appointments below: Follow-up Information     Follow up With Specialties Details Why Contact Info    Alison Mcintyre NP Nurse Practitioner   [address] [address]      Haseeb Sherman MD Gastroenterology  Please call and schedule appointment  [facility] Walker [address] [place] [id number] [phone]            At this time, the following test results are still pending: none  Again, please follow-up these results with your primary care provider. Should you have any fever over 101 degrees for 24 hours, chest pain, shortness of breath, fever, chills, nausea, vomiting, diarrhea, change in mentation, falling, weakness, bleeding, or worsening pain, please seek medical attention immediately. If you have any questions, I can be reached at [phone].   Thank you so much again for allowing me to care for you at [address] [place] [address].    Respectfully yours,  Arvind Pacheco MD

## 2019-10-31 LAB
O+P SPEC MICRO: NORMAL
O+P STL CONC: NORMAL
SPECIMEN SOURCE: NORMAL

## 2021-04-07 ENCOUNTER — TRANSCRIBE ORDER (OUTPATIENT)
Dept: SCHEDULING | Age: 61
End: 2021-04-07

## 2021-04-07 DIAGNOSIS — R40.0 SLEEPINESS: ICD-10-CM

## 2021-04-07 DIAGNOSIS — C50.612 MALIGNANT NEOPLASM OF AXILLARY TAIL OF LEFT FEMALE BREAST (HCC): Primary | ICD-10-CM

## 2021-04-12 ENCOUNTER — HOSPITAL ENCOUNTER (OUTPATIENT)
Dept: MRI IMAGING | Age: 61
Discharge: HOME | End: 2021-04-12
Attending: INTERNAL MEDICINE
Payer: COMMERCIAL

## 2021-04-12 ENCOUNTER — TRANSCRIBE ORDER (OUTPATIENT)
Dept: SCHEDULING | Age: 61
End: 2021-04-12

## 2021-04-12 VITALS — BODY MASS INDEX: 20.73 KG/M2 | WEIGHT: 117 LBS

## 2021-04-12 DIAGNOSIS — R40.0 SLEEPINESS: ICD-10-CM

## 2021-04-12 DIAGNOSIS — C50.612 MALIGNANT NEOPLASM OF AXILLARY TAIL OF LEFT FEMALE BREAST (HCC): ICD-10-CM

## 2021-04-12 DIAGNOSIS — C50.612 MALIGNANT NEOPLASM OF AXILLARY TAIL OF LEFT FEMALE BREAST (HCC): Primary | ICD-10-CM

## 2021-04-12 PROCEDURE — 74011250636 HC RX REV CODE- 250/636: Performed by: INTERNAL MEDICINE

## 2021-04-12 PROCEDURE — 70553 MRI BRAIN STEM W/O & W/DYE: CPT

## 2021-04-12 PROCEDURE — A9575 INJ GADOTERATE MEGLUMI 0.1ML: HCPCS | Performed by: INTERNAL MEDICINE

## 2021-04-12 RX ORDER — GADOTERATE MEGLUMINE 376.9 MG/ML
10 INJECTION INTRAVENOUS ONCE
Status: COMPLETED | OUTPATIENT
Start: 2021-04-12 | End: 2021-04-12

## 2021-04-12 RX ADMIN — GADOTERATE MEGLUMINE 10 ML: 376.9 INJECTION INTRAVENOUS at 11:40

## 2022-01-01 ENCOUNTER — APPOINTMENT (OUTPATIENT)
Dept: ULTRASOUND IMAGING | Age: 62
DRG: 846 | End: 2022-01-01
Attending: INTERNAL MEDICINE
Payer: COMMERCIAL

## 2022-01-01 ENCOUNTER — HOME CARE VISIT (OUTPATIENT)
Dept: HOSPICE | Facility: HOSPICE | Age: 62
End: 2022-01-01
Payer: COMMERCIAL

## 2022-01-01 ENCOUNTER — HOME CARE VISIT (OUTPATIENT)
Dept: SCHEDULING | Facility: HOME HEALTH | Age: 62
End: 2022-01-01
Payer: COMMERCIAL

## 2022-01-01 ENCOUNTER — HOSPITAL ENCOUNTER (EMERGENCY)
Age: 62
Discharge: HOME | End: 2022-09-10
Attending: EMERGENCY MEDICINE | Admitting: EMERGENCY MEDICINE
Payer: COMMERCIAL

## 2022-01-01 ENCOUNTER — APPOINTMENT (OUTPATIENT)
Dept: GENERAL RADIOLOGY | Age: 62
DRG: 846 | End: 2022-01-01
Attending: INTERNAL MEDICINE
Payer: COMMERCIAL

## 2022-01-01 ENCOUNTER — OFFICE VISIT (OUTPATIENT)
Dept: ENDOCRINOLOGY | Age: 62
End: 2022-01-01
Payer: COMMERCIAL

## 2022-01-01 ENCOUNTER — HOSPICE ADMISSION (OUTPATIENT)
Dept: HOSPICE | Facility: HOSPICE | Age: 62
End: 2022-01-01
Payer: COMMERCIAL

## 2022-01-01 ENCOUNTER — HOSPITAL ENCOUNTER (INPATIENT)
Age: 62
LOS: 15 days | Discharge: HOSPICE | DRG: 846 | End: 2022-09-30
Attending: INTERNAL MEDICINE | Admitting: INTERNAL MEDICINE
Payer: COMMERCIAL

## 2022-01-01 ENCOUNTER — APPOINTMENT (OUTPATIENT)
Dept: CT IMAGING | Age: 62
End: 2022-01-01
Attending: EMERGENCY MEDICINE
Payer: COMMERCIAL

## 2022-01-01 ENCOUNTER — APPOINTMENT (OUTPATIENT)
Dept: INTERVENTIONAL RADIOLOGY/VASCULAR | Age: 62
DRG: 846 | End: 2022-01-01
Attending: INTERNAL MEDICINE
Payer: COMMERCIAL

## 2022-01-01 ENCOUNTER — HOSPITAL ENCOUNTER (INPATIENT)
Dept: INTERVENTIONAL RADIOLOGY/VASCULAR | Age: 62
DRG: 846 | End: 2022-01-01
Attending: FAMILY MEDICINE
Payer: COMMERCIAL

## 2022-01-01 ENCOUNTER — TRANSCRIBE ORDER (OUTPATIENT)
Dept: SCHEDULING | Age: 62
End: 2022-01-01

## 2022-01-01 ENCOUNTER — HOSPITAL ENCOUNTER (OUTPATIENT)
Dept: MRI IMAGING | Age: 62
Discharge: HOME | End: 2022-08-08
Attending: INTERNAL MEDICINE
Payer: COMMERCIAL

## 2022-01-01 VITALS
HEART RATE: 92 BPM | RESPIRATION RATE: 24 BRPM | TEMPERATURE: 99.4 F | SYSTOLIC BLOOD PRESSURE: 80 MMHG | OXYGEN SATURATION: 97 % | DIASTOLIC BLOOD PRESSURE: 52 MMHG

## 2022-01-01 VITALS
OXYGEN SATURATION: 95 % | TEMPERATURE: 97.8 F | DIASTOLIC BLOOD PRESSURE: 40 MMHG | SYSTOLIC BLOOD PRESSURE: 90 MMHG | HEART RATE: 126 BPM | RESPIRATION RATE: 14 BRPM

## 2022-01-01 VITALS
DIASTOLIC BLOOD PRESSURE: 66 MMHG | SYSTOLIC BLOOD PRESSURE: 118 MMHG | TEMPERATURE: 98.3 F | HEART RATE: 120 BPM | RESPIRATION RATE: 20 BRPM

## 2022-01-01 VITALS
HEART RATE: 70 BPM | RESPIRATION RATE: 16 BRPM | DIASTOLIC BLOOD PRESSURE: 70 MMHG | BODY MASS INDEX: 21.95 KG/M2 | HEIGHT: 63 IN | OXYGEN SATURATION: 99 % | TEMPERATURE: 98.5 F | WEIGHT: 123.9 LBS | SYSTOLIC BLOOD PRESSURE: 117 MMHG

## 2022-01-01 VITALS
SYSTOLIC BLOOD PRESSURE: 96 MMHG | HEART RATE: 64 BPM | DIASTOLIC BLOOD PRESSURE: 60 MMHG | RESPIRATION RATE: 20 BRPM | TEMPERATURE: 98.4 F

## 2022-01-01 VITALS
OXYGEN SATURATION: 99 % | DIASTOLIC BLOOD PRESSURE: 58 MMHG | SYSTOLIC BLOOD PRESSURE: 114 MMHG | HEART RATE: 83 BPM | RESPIRATION RATE: 16 BRPM | TEMPERATURE: 98.4 F

## 2022-01-01 VITALS — TEMPERATURE: 97.9 F | HEART RATE: 98 BPM | RESPIRATION RATE: 14 BRPM | OXYGEN SATURATION: 100 %

## 2022-01-01 VITALS
HEIGHT: 63 IN | HEART RATE: 65 BPM | WEIGHT: 132 LBS | DIASTOLIC BLOOD PRESSURE: 76 MMHG | BODY MASS INDEX: 23.39 KG/M2 | SYSTOLIC BLOOD PRESSURE: 156 MMHG

## 2022-01-01 VITALS — HEART RATE: 67 BPM | TEMPERATURE: 98.9 F | RESPIRATION RATE: 16 BRPM | OXYGEN SATURATION: 97 %

## 2022-01-01 VITALS — OXYGEN SATURATION: 94 % | HEART RATE: 139 BPM | RESPIRATION RATE: 18 BRPM | TEMPERATURE: 97.8 F

## 2022-01-01 VITALS
HEART RATE: 96 BPM | OXYGEN SATURATION: 96 % | DIASTOLIC BLOOD PRESSURE: 78 MMHG | SYSTOLIC BLOOD PRESSURE: 117 MMHG | TEMPERATURE: 99.2 F | RESPIRATION RATE: 20 BRPM

## 2022-01-01 VITALS — BODY MASS INDEX: 23.04 KG/M2 | HEIGHT: 63 IN | WEIGHT: 130 LBS

## 2022-01-01 VITALS — HEART RATE: 95 BPM | SYSTOLIC BLOOD PRESSURE: 110 MMHG | RESPIRATION RATE: 20 BRPM | DIASTOLIC BLOOD PRESSURE: 64 MMHG

## 2022-01-01 VITALS — HEART RATE: 68 BPM | SYSTOLIC BLOOD PRESSURE: 106 MMHG | RESPIRATION RATE: 16 BRPM | DIASTOLIC BLOOD PRESSURE: 65 MMHG

## 2022-01-01 VITALS — RESPIRATION RATE: 16 BRPM | HEART RATE: 65 BPM | TEMPERATURE: 97.8 F | OXYGEN SATURATION: 100 %

## 2022-01-01 VITALS — RESPIRATION RATE: 16 BRPM

## 2022-01-01 DIAGNOSIS — C50.612 MALIGNANT NEOPLASM OF AXILLARY TAIL OF LEFT FEMALE BREAST (HCC): Primary | ICD-10-CM

## 2022-01-01 DIAGNOSIS — E03.9 ACQUIRED HYPOTHYROIDISM: Primary | ICD-10-CM

## 2022-01-01 DIAGNOSIS — E04.2 MULTIPLE THYROID NODULES: ICD-10-CM

## 2022-01-01 DIAGNOSIS — C50.612 MALIGNANT NEOPLASM OF AXILLARY TAIL OF LEFT FEMALE BREAST (HCC): ICD-10-CM

## 2022-01-01 DIAGNOSIS — R11.2 NAUSEA WITH VOMITING: ICD-10-CM

## 2022-01-01 DIAGNOSIS — R11.2 NAUSEA AND VOMITING, UNSPECIFIED VOMITING TYPE: Primary | ICD-10-CM

## 2022-01-01 DIAGNOSIS — C50.919 METASTATIC BREAST CANCER (HCC): ICD-10-CM

## 2022-01-01 DIAGNOSIS — E86.0 DEHYDRATION: Primary | ICD-10-CM

## 2022-01-01 LAB
ALBUMIN SERPL-MCNC: 1.5 G/DL (ref 3.5–5)
ALBUMIN SERPL-MCNC: 1.6 G/DL (ref 3.5–5)
ALBUMIN SERPL-MCNC: 1.7 G/DL (ref 3.5–5)
ALBUMIN SERPL-MCNC: 1.8 G/DL (ref 3.5–5)
ALBUMIN SERPL-MCNC: 1.9 G/DL (ref 3.5–5)
ALBUMIN SERPL-MCNC: 1.9 G/DL (ref 3.5–5)
ALBUMIN SERPL-MCNC: 2 G/DL (ref 3.5–5)
ALBUMIN SERPL-MCNC: 2.1 G/DL (ref 3.5–5)
ALBUMIN SERPL-MCNC: 2.5 G/DL (ref 3.5–5)
ALBUMIN/GLOB SERPL: 0.5 {RATIO} (ref 1.1–2.2)
ALBUMIN/GLOB SERPL: 0.6 {RATIO} (ref 1.1–2.2)
ALBUMIN/GLOB SERPL: 0.7 {RATIO} (ref 1.1–2.2)
ALBUMIN/GLOB SERPL: 0.8 {RATIO} (ref 1.1–2.2)
ALBUMIN/GLOB SERPL: 0.9 {RATIO} (ref 1.1–2.2)
ALBUMIN/GLOB SERPL: 0.9 {RATIO} (ref 1.1–2.2)
ALBUMIN/GLOB SERPL: 1 {RATIO} (ref 1.1–2.2)
ALP SERPL-CCNC: 208 U/L (ref 45–117)
ALP SERPL-CCNC: 209 U/L (ref 45–117)
ALP SERPL-CCNC: 211 U/L (ref 45–117)
ALP SERPL-CCNC: 219 U/L (ref 45–117)
ALP SERPL-CCNC: 223 U/L (ref 45–117)
ALP SERPL-CCNC: 224 U/L (ref 45–117)
ALP SERPL-CCNC: 230 U/L (ref 45–117)
ALP SERPL-CCNC: 239 U/L (ref 45–117)
ALP SERPL-CCNC: 264 U/L (ref 45–117)
ALP SERPL-CCNC: 272 U/L (ref 45–117)
ALP SERPL-CCNC: 282 U/L (ref 45–117)
ALP SERPL-CCNC: 393 U/L (ref 45–117)
ALP SERPL-CCNC: 481 U/L (ref 45–117)
ALP SERPL-CCNC: 489 U/L (ref 45–117)
ALP SERPL-CCNC: 538 U/L (ref 45–117)
ALP SERPL-CCNC: 577 U/L (ref 45–117)
ALT SERPL-CCNC: 48 U/L (ref 12–78)
ALT SERPL-CCNC: 50 U/L (ref 12–78)
ALT SERPL-CCNC: 57 U/L (ref 12–78)
ALT SERPL-CCNC: 58 U/L (ref 12–78)
ALT SERPL-CCNC: 64 U/L (ref 12–78)
ALT SERPL-CCNC: 79 U/L (ref 12–78)
ALT SERPL-CCNC: 82 U/L (ref 12–78)
ALT SERPL-CCNC: 83 U/L (ref 12–78)
ALT SERPL-CCNC: 83 U/L (ref 12–78)
ALT SERPL-CCNC: 84 U/L (ref 12–78)
ALT SERPL-CCNC: 84 U/L (ref 12–78)
ALT SERPL-CCNC: 86 U/L (ref 12–78)
ALT SERPL-CCNC: 89 U/L (ref 12–78)
ALT SERPL-CCNC: 89 U/L (ref 12–78)
ALT SERPL-CCNC: 97 U/L (ref 12–78)
ALT SERPL-CCNC: 98 U/L (ref 12–78)
ANION GAP SERPL CALC-SCNC: 10 MMOL/L (ref 5–15)
ANION GAP SERPL CALC-SCNC: 10 MMOL/L (ref 5–15)
ANION GAP SERPL CALC-SCNC: 11 MMOL/L (ref 5–15)
ANION GAP SERPL CALC-SCNC: 12 MMOL/L (ref 5–15)
ANION GAP SERPL CALC-SCNC: 5 MMOL/L (ref 5–15)
ANION GAP SERPL CALC-SCNC: 6 MMOL/L (ref 5–15)
ANION GAP SERPL CALC-SCNC: 7 MMOL/L (ref 5–15)
ANION GAP SERPL CALC-SCNC: 9 MMOL/L (ref 5–15)
APPEARANCE FLD: ABNORMAL
APTT PPP: 69 SEC (ref 22.1–31)
AST SERPL-CCNC: 128 U/L (ref 15–37)
AST SERPL-CCNC: 145 U/L (ref 15–37)
AST SERPL-CCNC: 146 U/L (ref 15–37)
AST SERPL-CCNC: 150 U/L (ref 15–37)
AST SERPL-CCNC: 152 U/L (ref 15–37)
AST SERPL-CCNC: 169 U/L (ref 15–37)
AST SERPL-CCNC: 173 U/L (ref 15–37)
AST SERPL-CCNC: 198 U/L (ref 15–37)
AST SERPL-CCNC: 198 U/L (ref 15–37)
AST SERPL-CCNC: 222 U/L (ref 15–37)
AST SERPL-CCNC: 272 U/L (ref 15–37)
AST SERPL-CCNC: 314 U/L (ref 15–37)
AST SERPL-CCNC: 350 U/L (ref 15–37)
AST SERPL-CCNC: 376 U/L (ref 15–37)
AST SERPL-CCNC: 398 U/L (ref 15–37)
AST SERPL-CCNC: 471 U/L (ref 15–37)
BACTERIA SPEC CULT: NORMAL
BACTERIA SPEC CULT: NORMAL
BASOPHILS # BLD: 0 K/UL (ref 0–0.1)
BASOPHILS # BLD: 0.1 K/UL (ref 0–0.1)
BASOPHILS # BLD: 0.1 K/UL (ref 0–0.1)
BASOPHILS NFR BLD: 0 % (ref 0–1)
BASOPHILS NFR BLD: 1 % (ref 0–1)
BILIRUB DIRECT SERPL-MCNC: 4 MG/DL (ref 0–0.2)
BILIRUB INDIRECT SERPL-MCNC: 1 MG/DL (ref 0–1.1)
BILIRUB SERPL-MCNC: 2.8 MG/DL (ref 0.2–1)
BILIRUB SERPL-MCNC: 2.9 MG/DL (ref 0.2–1)
BILIRUB SERPL-MCNC: 3 MG/DL (ref 0.2–1)
BILIRUB SERPL-MCNC: 3.1 MG/DL (ref 0.2–1)
BILIRUB SERPL-MCNC: 3.4 MG/DL (ref 0.2–1)
BILIRUB SERPL-MCNC: 3.6 MG/DL (ref 0.2–1)
BILIRUB SERPL-MCNC: 4.9 MG/DL (ref 0.2–1)
BILIRUB SERPL-MCNC: 5 MG/DL (ref 0.2–1)
BILIRUB SERPL-MCNC: 5 MG/DL (ref 0.2–1)
BILIRUB SERPL-MCNC: 5.5 MG/DL (ref 0.2–1)
BILIRUB SERPL-MCNC: 5.6 MG/DL (ref 0.2–1)
BILIRUB SERPL-MCNC: 5.8 MG/DL (ref 0.2–1)
BILIRUB SERPL-MCNC: 6 MG/DL (ref 0.2–1)
BILIRUB SERPL-MCNC: 6.6 MG/DL (ref 0.2–1)
BUN SERPL-MCNC: 10 MG/DL (ref 6–20)
BUN SERPL-MCNC: 10 MG/DL (ref 6–20)
BUN SERPL-MCNC: 11 MG/DL (ref 6–20)
BUN SERPL-MCNC: 11 MG/DL (ref 6–20)
BUN SERPL-MCNC: 12 MG/DL (ref 6–20)
BUN SERPL-MCNC: 12 MG/DL (ref 6–20)
BUN SERPL-MCNC: 14 MG/DL (ref 6–20)
BUN SERPL-MCNC: 15 MG/DL (ref 6–20)
BUN SERPL-MCNC: 16 MG/DL (ref 6–20)
BUN SERPL-MCNC: 16 MG/DL (ref 6–20)
BUN SERPL-MCNC: 17 MG/DL (ref 6–20)
BUN SERPL-MCNC: 7 MG/DL (ref 6–20)
BUN SERPL-MCNC: 8 MG/DL (ref 6–20)
BUN SERPL-MCNC: 8 MG/DL (ref 6–20)
BUN SERPL-MCNC: 9 MG/DL (ref 6–20)
BUN SERPL-MCNC: 9 MG/DL (ref 6–20)
BUN/CREAT SERPL: 12 (ref 12–20)
BUN/CREAT SERPL: 15 (ref 12–20)
BUN/CREAT SERPL: 15 (ref 12–20)
BUN/CREAT SERPL: 18 (ref 12–20)
BUN/CREAT SERPL: 19 (ref 12–20)
BUN/CREAT SERPL: 20 (ref 12–20)
BUN/CREAT SERPL: 21 (ref 12–20)
BUN/CREAT SERPL: 21 (ref 12–20)
BUN/CREAT SERPL: 22 (ref 12–20)
BUN/CREAT SERPL: 25 (ref 12–20)
BUN/CREAT SERPL: 25 (ref 12–20)
BUN/CREAT SERPL: 26 (ref 12–20)
BUN/CREAT SERPL: 26 (ref 12–20)
BUN/CREAT SERPL: 28 (ref 12–20)
BUN/CREAT SERPL: 32 (ref 12–20)
BUN/CREAT SERPL: 40 (ref 12–20)
CALCIUM SERPL-MCNC: 5.9 MG/DL (ref 8.5–10.1)
CALCIUM SERPL-MCNC: 5.9 MG/DL (ref 8.5–10.1)
CALCIUM SERPL-MCNC: 6.1 MG/DL (ref 8.5–10.1)
CALCIUM SERPL-MCNC: 6.3 MG/DL (ref 8.5–10.1)
CALCIUM SERPL-MCNC: 6.3 MG/DL (ref 8.5–10.1)
CALCIUM SERPL-MCNC: 6.5 MG/DL (ref 8.5–10.1)
CALCIUM SERPL-MCNC: 6.5 MG/DL (ref 8.5–10.1)
CALCIUM SERPL-MCNC: 6.6 MG/DL (ref 8.5–10.1)
CALCIUM SERPL-MCNC: 6.7 MG/DL (ref 8.5–10.1)
CALCIUM SERPL-MCNC: 6.7 MG/DL (ref 8.5–10.1)
CALCIUM SERPL-MCNC: 6.8 MG/DL (ref 8.5–10.1)
CALCIUM SERPL-MCNC: 7 MG/DL (ref 8.5–10.1)
CALCIUM SERPL-MCNC: 7 MG/DL (ref 8.5–10.1)
CALCIUM SERPL-MCNC: 7.2 MG/DL (ref 8.5–10.1)
CALCIUM SERPL-MCNC: 7.4 MG/DL (ref 8.5–10.1)
CALCIUM SERPL-MCNC: 7.4 MG/DL (ref 8.5–10.1)
CANCER AG27-29 SERPL-ACNC: 330.2 U/ML (ref 0–38.6)
CHLORIDE SERPL-SCNC: 101 MMOL/L (ref 97–108)
CHLORIDE SERPL-SCNC: 102 MMOL/L (ref 97–108)
CHLORIDE SERPL-SCNC: 102 MMOL/L (ref 97–108)
CHLORIDE SERPL-SCNC: 103 MMOL/L (ref 97–108)
CHLORIDE SERPL-SCNC: 104 MMOL/L (ref 97–108)
CHLORIDE SERPL-SCNC: 105 MMOL/L (ref 97–108)
CHLORIDE SERPL-SCNC: 106 MMOL/L (ref 97–108)
CHLORIDE SERPL-SCNC: 107 MMOL/L (ref 97–108)
CO2 SERPL-SCNC: 21 MMOL/L (ref 21–32)
CO2 SERPL-SCNC: 22 MMOL/L (ref 21–32)
CO2 SERPL-SCNC: 22 MMOL/L (ref 21–32)
CO2 SERPL-SCNC: 23 MMOL/L (ref 21–32)
CO2 SERPL-SCNC: 24 MMOL/L (ref 21–32)
CO2 SERPL-SCNC: 25 MMOL/L (ref 21–32)
COLOR FLD: YELLOW
COMMENT, HOLDF: NORMAL
CREAT SERPL-MCNC: 0.4 MG/DL (ref 0.55–1.02)
CREAT SERPL-MCNC: 0.43 MG/DL (ref 0.55–1.02)
CREAT SERPL-MCNC: 0.44 MG/DL (ref 0.55–1.02)
CREAT SERPL-MCNC: 0.46 MG/DL (ref 0.55–1.02)
CREAT SERPL-MCNC: 0.46 MG/DL (ref 0.55–1.02)
CREAT SERPL-MCNC: 0.48 MG/DL (ref 0.55–1.02)
CREAT SERPL-MCNC: 0.48 MG/DL (ref 0.55–1.02)
CREAT SERPL-MCNC: 0.5 MG/DL (ref 0.55–1.02)
CREAT SERPL-MCNC: 0.51 MG/DL (ref 0.55–1.02)
CREAT SERPL-MCNC: 0.52 MG/DL (ref 0.55–1.02)
CREAT SERPL-MCNC: 0.53 MG/DL (ref 0.55–1.02)
CREAT SERPL-MCNC: 0.53 MG/DL (ref 0.55–1.02)
CREAT SERPL-MCNC: 0.54 MG/DL (ref 0.55–1.02)
CREAT SERPL-MCNC: 0.57 MG/DL (ref 0.55–1.02)
CREAT SERPL-MCNC: 0.6 MG/DL (ref 0.55–1.02)
CREAT SERPL-MCNC: 0.82 MG/DL (ref 0.55–1.02)
DIFFERENTIAL METHOD BLD: ABNORMAL
EOSINOPHIL # BLD: 0 K/UL (ref 0–0.4)
EOSINOPHIL NFR BLD: 0 % (ref 0–7)
ERYTHROCYTE [DISTWIDTH] IN BLOOD BY AUTOMATED COUNT: 18.9 % (ref 11.5–14.5)
ERYTHROCYTE [DISTWIDTH] IN BLOOD BY AUTOMATED COUNT: 19 % (ref 11.5–14.5)
ERYTHROCYTE [DISTWIDTH] IN BLOOD BY AUTOMATED COUNT: 19.1 % (ref 11.5–14.5)
ERYTHROCYTE [DISTWIDTH] IN BLOOD BY AUTOMATED COUNT: 19.1 % (ref 11.5–14.5)
ERYTHROCYTE [DISTWIDTH] IN BLOOD BY AUTOMATED COUNT: 19.2 % (ref 11.5–14.5)
ERYTHROCYTE [DISTWIDTH] IN BLOOD BY AUTOMATED COUNT: 19.3 % (ref 11.5–14.5)
ERYTHROCYTE [DISTWIDTH] IN BLOOD BY AUTOMATED COUNT: 19.3 % (ref 11.5–14.5)
ERYTHROCYTE [DISTWIDTH] IN BLOOD BY AUTOMATED COUNT: 19.5 % (ref 11.5–14.5)
ERYTHROCYTE [DISTWIDTH] IN BLOOD BY AUTOMATED COUNT: 19.7 % (ref 11.5–14.5)
ERYTHROCYTE [DISTWIDTH] IN BLOOD BY AUTOMATED COUNT: 19.9 % (ref 11.5–14.5)
ERYTHROCYTE [DISTWIDTH] IN BLOOD BY AUTOMATED COUNT: 20.3 % (ref 11.5–14.5)
ERYTHROCYTE [DISTWIDTH] IN BLOOD BY AUTOMATED COUNT: 21 % (ref 11.5–14.5)
ERYTHROCYTE [DISTWIDTH] IN BLOOD BY AUTOMATED COUNT: 21.4 % (ref 11.5–14.5)
GLOBULIN SER CALC-MCNC: 2.3 G/DL (ref 2–4)
GLOBULIN SER CALC-MCNC: 2.4 G/DL (ref 2–4)
GLOBULIN SER CALC-MCNC: 2.5 G/DL (ref 2–4)
GLOBULIN SER CALC-MCNC: 2.8 G/DL (ref 2–4)
GLOBULIN SER CALC-MCNC: 2.8 G/DL (ref 2–4)
GLOBULIN SER CALC-MCNC: 2.9 G/DL (ref 2–4)
GLOBULIN SER CALC-MCNC: 3.1 G/DL (ref 2–4)
GLUCOSE SERPL-MCNC: 145 MG/DL (ref 65–100)
GLUCOSE SERPL-MCNC: 146 MG/DL (ref 65–100)
GLUCOSE SERPL-MCNC: 152 MG/DL (ref 65–100)
GLUCOSE SERPL-MCNC: 158 MG/DL (ref 65–100)
GLUCOSE SERPL-MCNC: 168 MG/DL (ref 65–100)
GLUCOSE SERPL-MCNC: 170 MG/DL (ref 65–100)
GLUCOSE SERPL-MCNC: 181 MG/DL (ref 65–100)
GLUCOSE SERPL-MCNC: 191 MG/DL (ref 65–100)
GLUCOSE SERPL-MCNC: 199 MG/DL (ref 65–100)
GLUCOSE SERPL-MCNC: 201 MG/DL (ref 65–100)
GLUCOSE SERPL-MCNC: 204 MG/DL (ref 65–100)
GLUCOSE SERPL-MCNC: 211 MG/DL (ref 65–100)
GLUCOSE SERPL-MCNC: 239 MG/DL (ref 65–100)
GLUCOSE SERPL-MCNC: 69 MG/DL (ref 65–100)
GLUCOSE SERPL-MCNC: 77 MG/DL (ref 65–100)
GLUCOSE SERPL-MCNC: 97 MG/DL (ref 65–100)
GRAM STN SPEC: NORMAL
GRAM STN SPEC: NORMAL
HCT VFR BLD AUTO: 24 % (ref 35–47)
HCT VFR BLD AUTO: 24.5 % (ref 35–47)
HCT VFR BLD AUTO: 24.6 % (ref 35–47)
HCT VFR BLD AUTO: 25.1 % (ref 35–47)
HCT VFR BLD AUTO: 25.1 % (ref 35–47)
HCT VFR BLD AUTO: 25.2 % (ref 35–47)
HCT VFR BLD AUTO: 25.3 % (ref 35–47)
HCT VFR BLD AUTO: 25.4 % (ref 35–47)
HCT VFR BLD AUTO: 26 % (ref 35–47)
HCT VFR BLD AUTO: 26.4 % (ref 35–47)
HCT VFR BLD AUTO: 26.9 % (ref 35–47)
HCT VFR BLD AUTO: 27.2 % (ref 35–47)
HCT VFR BLD AUTO: 27.6 % (ref 35–47)
HCT VFR BLD AUTO: 28.6 % (ref 35–47)
HCT VFR BLD AUTO: 28.6 % (ref 35–47)
HCT VFR BLD AUTO: 31.1 % (ref 35–47)
HGB BLD-MCNC: 10.3 G/DL (ref 11.5–16)
HGB BLD-MCNC: 8.1 G/DL (ref 11.5–16)
HGB BLD-MCNC: 8.2 G/DL (ref 11.5–16)
HGB BLD-MCNC: 8.3 G/DL (ref 11.5–16)
HGB BLD-MCNC: 8.5 G/DL (ref 11.5–16)
HGB BLD-MCNC: 8.5 G/DL (ref 11.5–16)
HGB BLD-MCNC: 8.6 G/DL (ref 11.5–16)
HGB BLD-MCNC: 8.7 G/DL (ref 11.5–16)
HGB BLD-MCNC: 9 G/DL (ref 11.5–16)
HGB BLD-MCNC: 9 G/DL (ref 11.5–16)
HGB BLD-MCNC: 9.3 G/DL (ref 11.5–16)
HGB BLD-MCNC: 9.3 G/DL (ref 11.5–16)
HGB BLD-MCNC: 9.5 G/DL (ref 11.5–16)
IMM GRANULOCYTES # BLD AUTO: 0 K/UL
IMM GRANULOCYTES # BLD AUTO: 0 K/UL (ref 0–0.04)
IMM GRANULOCYTES # BLD AUTO: 0 K/UL (ref 0–0.04)
IMM GRANULOCYTES # BLD AUTO: 0.1 K/UL (ref 0–0.04)
IMM GRANULOCYTES NFR BLD AUTO: 0 %
IMM GRANULOCYTES NFR BLD AUTO: 1 % (ref 0–0.5)
IMM GRANULOCYTES NFR BLD AUTO: 1 % (ref 0–0.5)
IMM GRANULOCYTES NFR BLD AUTO: 2 % (ref 0–0.5)
INR PPP: 1.5 (ref 0.9–1.1)
LACTATE BLD-SCNC: 2 MMOL/L (ref 0.4–2)
LYMPHOCYTES # BLD: 0.2 K/UL (ref 0.8–3.5)
LYMPHOCYTES # BLD: 0.3 K/UL (ref 0.8–3.5)
LYMPHOCYTES # BLD: 0.4 K/UL (ref 0.8–3.5)
LYMPHOCYTES # BLD: 0.5 K/UL (ref 0.8–3.5)
LYMPHOCYTES # BLD: 0.5 K/UL (ref 0.8–3.5)
LYMPHOCYTES # BLD: 0.6 K/UL (ref 0.8–3.5)
LYMPHOCYTES # BLD: 0.9 K/UL (ref 0.8–3.5)
LYMPHOCYTES # BLD: 1.3 K/UL (ref 0.8–3.5)
LYMPHOCYTES # BLD: 1.4 K/UL (ref 0.8–3.5)
LYMPHOCYTES NFR BLD: 23 % (ref 12–49)
LYMPHOCYTES NFR BLD: 23 % (ref 12–49)
LYMPHOCYTES NFR BLD: 24 % (ref 12–49)
LYMPHOCYTES NFR BLD: 24 % (ref 12–49)
LYMPHOCYTES NFR BLD: 26 % (ref 12–49)
LYMPHOCYTES NFR BLD: 26 % (ref 12–49)
LYMPHOCYTES NFR BLD: 29 % (ref 12–49)
LYMPHOCYTES NFR BLD: 31 % (ref 12–49)
LYMPHOCYTES NFR BLD: 35 % (ref 12–49)
LYMPHOCYTES NFR BLD: 37 % (ref 12–49)
LYMPHOCYTES NFR BLD: 8 % (ref 12–49)
LYMPHOCYTES NFR BLD: 9 % (ref 12–49)
LYMPHOCYTES NFR FLD: 20 %
MAGNESIUM SERPL-MCNC: 2.2 MG/DL (ref 1.6–2.4)
MAGNESIUM SERPL-MCNC: 2.7 MG/DL (ref 1.6–2.4)
MCH RBC QN AUTO: 32.1 PG (ref 26–34)
MCH RBC QN AUTO: 32.4 PG (ref 26–34)
MCH RBC QN AUTO: 32.7 PG (ref 26–34)
MCH RBC QN AUTO: 33 PG (ref 26–34)
MCH RBC QN AUTO: 33 PG (ref 26–34)
MCH RBC QN AUTO: 33.1 PG (ref 26–34)
MCH RBC QN AUTO: 33.2 PG (ref 26–34)
MCH RBC QN AUTO: 33.3 PG (ref 26–34)
MCH RBC QN AUTO: 33.6 PG (ref 26–34)
MCHC RBC AUTO-ENTMCNC: 32 G/DL (ref 30–36.5)
MCHC RBC AUTO-ENTMCNC: 32.5 G/DL (ref 30–36.5)
MCHC RBC AUTO-ENTMCNC: 32.6 G/DL (ref 30–36.5)
MCHC RBC AUTO-ENTMCNC: 32.7 G/DL (ref 30–36.5)
MCHC RBC AUTO-ENTMCNC: 32.7 G/DL (ref 30–36.5)
MCHC RBC AUTO-ENTMCNC: 32.9 G/DL (ref 30–36.5)
MCHC RBC AUTO-ENTMCNC: 33 G/DL (ref 30–36.5)
MCHC RBC AUTO-ENTMCNC: 33.1 G/DL (ref 30–36.5)
MCHC RBC AUTO-ENTMCNC: 33.2 G/DL (ref 30–36.5)
MCHC RBC AUTO-ENTMCNC: 33.5 G/DL (ref 30–36.5)
MCHC RBC AUTO-ENTMCNC: 33.5 G/DL (ref 30–36.5)
MCHC RBC AUTO-ENTMCNC: 33.8 G/DL (ref 30–36.5)
MCHC RBC AUTO-ENTMCNC: 34.1 G/DL (ref 30–36.5)
MCHC RBC AUTO-ENTMCNC: 34.2 G/DL (ref 30–36.5)
MCV RBC AUTO: 100 FL (ref 80–99)
MCV RBC AUTO: 100.4 FL (ref 80–99)
MCV RBC AUTO: 100.4 FL (ref 80–99)
MCV RBC AUTO: 100.6 FL (ref 80–99)
MCV RBC AUTO: 101.4 FL (ref 80–99)
MCV RBC AUTO: 101.8 FL (ref 80–99)
MCV RBC AUTO: 103.7 FL (ref 80–99)
MCV RBC AUTO: 97.2 FL (ref 80–99)
MCV RBC AUTO: 97.3 FL (ref 80–99)
MCV RBC AUTO: 97.5 FL (ref 80–99)
MCV RBC AUTO: 98 FL (ref 80–99)
MCV RBC AUTO: 98.4 FL (ref 80–99)
MCV RBC AUTO: 98.9 FL (ref 80–99)
MCV RBC AUTO: 99.2 FL (ref 80–99)
MESOTHL CELL NFR FLD: 3 %
METAMYELOCYTES NFR BLD MANUAL: 1 %
METAMYELOCYTES NFR BLD MANUAL: 2 %
METAMYELOCYTES NFR BLD MANUAL: 3 %
METAMYELOCYTES NFR BLD MANUAL: 3 %
METAMYELOCYTES NFR BLD MANUAL: 8 %
MONOCYTES # BLD: 0 K/UL (ref 0–1)
MONOCYTES # BLD: 0 K/UL (ref 0–1)
MONOCYTES # BLD: 0.1 K/UL (ref 0–1)
MONOCYTES # BLD: 0.2 K/UL (ref 0–1)
MONOCYTES # BLD: 0.3 K/UL (ref 0–1)
MONOCYTES # BLD: 0.4 K/UL (ref 0–1)
MONOCYTES # BLD: 0.6 K/UL (ref 0–1)
MONOCYTES NFR BLD: 1 % (ref 5–13)
MONOCYTES NFR BLD: 11 % (ref 5–13)
MONOCYTES NFR BLD: 11 % (ref 5–13)
MONOCYTES NFR BLD: 14 % (ref 5–13)
MONOCYTES NFR BLD: 16 % (ref 5–13)
MONOCYTES NFR BLD: 18 % (ref 5–13)
MONOCYTES NFR BLD: 25 % (ref 5–13)
MONOCYTES NFR BLD: 3 % (ref 5–13)
MONOCYTES NFR BLD: 4 % (ref 5–13)
MONOCYTES NFR BLD: 8 % (ref 5–13)
MONOS+MACROS NFR FLD: 76 %
MYELOCYTES NFR BLD MANUAL: 1 %
MYELOCYTES NFR BLD MANUAL: 4 %
MYELOCYTES NFR BLD MANUAL: 6 %
NEUTROPHILS NFR FLD: 1 %
NEUTS BAND NFR BLD MANUAL: 1 % (ref 0–6)
NEUTS BAND NFR BLD MANUAL: 2 % (ref 0–6)
NEUTS BAND NFR BLD MANUAL: 3 % (ref 0–6)
NEUTS BAND NFR BLD MANUAL: 4 % (ref 0–6)
NEUTS BAND NFR BLD MANUAL: 6 % (ref 0–6)
NEUTS BAND NFR BLD MANUAL: 7 % (ref 0–6)
NEUTS BAND NFR BLD MANUAL: 9 % (ref 0–6)
NEUTS SEG # BLD: 0.5 K/UL (ref 1.8–8)
NEUTS SEG # BLD: 0.7 K/UL (ref 1.8–8)
NEUTS SEG # BLD: 0.7 K/UL (ref 1.8–8)
NEUTS SEG # BLD: 0.9 K/UL (ref 1.8–8)
NEUTS SEG # BLD: 0.9 K/UL (ref 1.8–8)
NEUTS SEG # BLD: 1 K/UL (ref 1.8–8)
NEUTS SEG # BLD: 1.3 K/UL (ref 1.8–8)
NEUTS SEG # BLD: 2.5 K/UL (ref 1.8–8)
NEUTS SEG # BLD: 2.6 K/UL (ref 1.8–8)
NEUTS SEG # BLD: 3.5 K/UL (ref 1.8–8)
NEUTS SEG # BLD: 4.3 K/UL (ref 1.8–8)
NEUTS SEG # BLD: 5.4 K/UL (ref 1.8–8)
NEUTS SEG NFR BLD: 30 % (ref 32–75)
NEUTS SEG NFR BLD: 36 % (ref 32–75)
NEUTS SEG NFR BLD: 42 % (ref 32–75)
NEUTS SEG NFR BLD: 49 % (ref 32–75)
NEUTS SEG NFR BLD: 56 % (ref 32–75)
NEUTS SEG NFR BLD: 61 % (ref 32–75)
NEUTS SEG NFR BLD: 62 % (ref 32–75)
NEUTS SEG NFR BLD: 64 % (ref 32–75)
NEUTS SEG NFR BLD: 70 % (ref 32–75)
NEUTS SEG NFR BLD: 72 % (ref 32–75)
NEUTS SEG NFR BLD: 82 % (ref 32–75)
NEUTS SEG NFR BLD: 87 % (ref 32–75)
NRBC # BLD: 0 K/UL (ref 0–0.01)
NRBC # BLD: 0.02 K/UL (ref 0–0.01)
NRBC # BLD: 0.02 K/UL (ref 0–0.01)
NRBC # BLD: 0.04 K/UL (ref 0–0.01)
NRBC # BLD: 0.05 K/UL (ref 0–0.01)
NRBC # BLD: 0.07 K/UL (ref 0–0.01)
NRBC # BLD: 0.09 K/UL (ref 0–0.01)
NRBC # BLD: 0.1 K/UL (ref 0–0.01)
NRBC # BLD: 0.12 K/UL (ref 0–0.01)
NRBC # BLD: 0.14 K/UL (ref 0–0.01)
NRBC # BLD: 0.15 K/UL (ref 0–0.01)
NRBC # BLD: 0.21 K/UL (ref 0–0.01)
NRBC BLD-RTO: 0 PER 100 WBC
NRBC BLD-RTO: 0.5 PER 100 WBC
NRBC BLD-RTO: 0.6 PER 100 WBC
NRBC BLD-RTO: 1.2 PER 100 WBC
NRBC BLD-RTO: 1.2 PER 100 WBC
NRBC BLD-RTO: 1.3 PER 100 WBC
NRBC BLD-RTO: 1.4 PER 100 WBC
NRBC BLD-RTO: 1.8 PER 100 WBC
NRBC BLD-RTO: 10.1 PER 100 WBC
NRBC BLD-RTO: 2.7 PER 100 WBC
NRBC BLD-RTO: 3.2 PER 100 WBC
NRBC BLD-RTO: 3.5 PER 100 WBC
NRBC BLD-RTO: 5.9 PER 100 WBC
NRBC BLD-RTO: 7.1 PER 100 WBC
NUC CELL # FLD: 41 /CU MM
PHOSPHATE SERPL-MCNC: 1 MG/DL (ref 2.6–4.7)
PHOSPHATE SERPL-MCNC: 1 MG/DL (ref 2.6–4.7)
PHOSPHATE SERPL-MCNC: 1.1 MG/DL (ref 2.6–4.7)
PHOSPHATE SERPL-MCNC: 1.2 MG/DL (ref 2.6–4.7)
PHOSPHATE SERPL-MCNC: 1.2 MG/DL (ref 2.6–4.7)
PHOSPHATE SERPL-MCNC: 1.3 MG/DL (ref 2.6–4.7)
PHOSPHATE SERPL-MCNC: 1.5 MG/DL (ref 2.6–4.7)
PHOSPHATE SERPL-MCNC: 1.7 MG/DL (ref 2.6–4.7)
PHOSPHATE SERPL-MCNC: 1.8 MG/DL (ref 2.6–4.7)
PLATELET # BLD AUTO: 108 K/UL (ref 150–400)
PLATELET # BLD AUTO: 108 K/UL (ref 150–400)
PLATELET # BLD AUTO: 119 K/UL (ref 150–400)
PLATELET # BLD AUTO: 121 K/UL (ref 150–400)
PLATELET # BLD AUTO: 124 K/UL (ref 150–400)
PLATELET # BLD AUTO: 139 K/UL (ref 150–400)
PLATELET # BLD AUTO: 62 K/UL (ref 150–400)
PLATELET # BLD AUTO: 62 K/UL (ref 150–400)
PLATELET # BLD AUTO: 64 K/UL (ref 150–400)
PLATELET # BLD AUTO: 72 K/UL (ref 150–400)
PLATELET # BLD AUTO: 72 K/UL (ref 150–400)
PLATELET # BLD AUTO: 74 K/UL (ref 150–400)
PLATELET # BLD AUTO: 75 K/UL (ref 150–400)
PLATELET # BLD AUTO: 93 K/UL (ref 150–400)
PLATELET # BLD AUTO: 94 K/UL (ref 150–400)
PLATELET # BLD AUTO: 96 K/UL (ref 150–400)
PMV BLD AUTO: 10.1 FL (ref 8.9–12.9)
PMV BLD AUTO: 10.2 FL (ref 8.9–12.9)
PMV BLD AUTO: 10.2 FL (ref 8.9–12.9)
PMV BLD AUTO: 10.3 FL (ref 8.9–12.9)
PMV BLD AUTO: 10.3 FL (ref 8.9–12.9)
PMV BLD AUTO: 10.5 FL (ref 8.9–12.9)
PMV BLD AUTO: 10.5 FL (ref 8.9–12.9)
PMV BLD AUTO: 10.6 FL (ref 8.9–12.9)
PMV BLD AUTO: 10.7 FL (ref 8.9–12.9)
PMV BLD AUTO: 10.7 FL (ref 8.9–12.9)
PMV BLD AUTO: 10.8 FL (ref 8.9–12.9)
PMV BLD AUTO: 11.3 FL (ref 8.9–12.9)
PMV BLD AUTO: 11.4 FL (ref 8.9–12.9)
PMV BLD AUTO: 12.4 FL (ref 8.9–12.9)
PMV BLD AUTO: 9.8 FL (ref 8.9–12.9)
PMV BLD AUTO: 9.9 FL (ref 8.9–12.9)
POTASSIUM SERPL-SCNC: 3.2 MMOL/L (ref 3.5–5.1)
POTASSIUM SERPL-SCNC: 3.4 MMOL/L (ref 3.5–5.1)
POTASSIUM SERPL-SCNC: 3.6 MMOL/L (ref 3.5–5.1)
POTASSIUM SERPL-SCNC: 3.6 MMOL/L (ref 3.5–5.1)
POTASSIUM SERPL-SCNC: 3.8 MMOL/L (ref 3.5–5.1)
POTASSIUM SERPL-SCNC: 3.8 MMOL/L (ref 3.5–5.1)
POTASSIUM SERPL-SCNC: 3.9 MMOL/L (ref 3.5–5.1)
POTASSIUM SERPL-SCNC: 3.9 MMOL/L (ref 3.5–5.1)
POTASSIUM SERPL-SCNC: 4 MMOL/L (ref 3.5–5.1)
POTASSIUM SERPL-SCNC: 4.2 MMOL/L (ref 3.5–5.1)
PROMYELOCYTES NFR BLD MANUAL: 1 %
PROMYELOCYTES NFR BLD MANUAL: 1 %
PROT SERPL-MCNC: 3.9 G/DL (ref 6.4–8.2)
PROT SERPL-MCNC: 4 G/DL (ref 6.4–8.2)
PROT SERPL-MCNC: 4.1 G/DL (ref 6.4–8.2)
PROT SERPL-MCNC: 4.1 G/DL (ref 6.4–8.2)
PROT SERPL-MCNC: 4.2 G/DL (ref 6.4–8.2)
PROT SERPL-MCNC: 4.3 G/DL (ref 6.4–8.2)
PROT SERPL-MCNC: 4.4 G/DL (ref 6.4–8.2)
PROT SERPL-MCNC: 4.5 G/DL (ref 6.4–8.2)
PROT SERPL-MCNC: 4.5 G/DL (ref 6.4–8.2)
PROT SERPL-MCNC: 4.6 G/DL (ref 6.4–8.2)
PROT SERPL-MCNC: 5 G/DL (ref 6.4–8.2)
PROT SERPL-MCNC: 5.2 G/DL (ref 6.4–8.2)
PROTHROMBIN TIME: 15.1 SEC (ref 9–11.1)
RBC # BLD AUTO: 2.44 M/UL (ref 3.8–5.2)
RBC # BLD AUTO: 2.44 M/UL (ref 3.8–5.2)
RBC # BLD AUTO: 2.48 M/UL (ref 3.8–5.2)
RBC # BLD AUTO: 2.52 M/UL (ref 3.8–5.2)
RBC # BLD AUTO: 2.53 M/UL (ref 3.8–5.2)
RBC # BLD AUTO: 2.53 M/UL (ref 3.8–5.2)
RBC # BLD AUTO: 2.56 M/UL (ref 3.8–5.2)
RBC # BLD AUTO: 2.59 M/UL (ref 3.8–5.2)
RBC # BLD AUTO: 2.62 M/UL (ref 3.8–5.2)
RBC # BLD AUTO: 2.64 M/UL (ref 3.8–5.2)
RBC # BLD AUTO: 2.71 M/UL (ref 3.8–5.2)
RBC # BLD AUTO: 2.72 M/UL (ref 3.8–5.2)
RBC # BLD AUTO: 2.79 M/UL (ref 3.8–5.2)
RBC # BLD AUTO: 2.82 M/UL (ref 3.8–5.2)
RBC # BLD AUTO: 2.85 M/UL (ref 3.8–5.2)
RBC # BLD AUTO: 3.09 M/UL (ref 3.8–5.2)
RBC # FLD: >100 /CU MM
RBC MORPH BLD: ABNORMAL
SAMPLES BEING HELD,HOLD: NORMAL
SERVICE CMNT-IMP: NORMAL
SERVICE CMNT-IMP: NORMAL
SODIUM SERPL-SCNC: 132 MMOL/L (ref 136–145)
SODIUM SERPL-SCNC: 132 MMOL/L (ref 136–145)
SODIUM SERPL-SCNC: 133 MMOL/L (ref 136–145)
SODIUM SERPL-SCNC: 134 MMOL/L (ref 136–145)
SODIUM SERPL-SCNC: 134 MMOL/L (ref 136–145)
SODIUM SERPL-SCNC: 135 MMOL/L (ref 136–145)
SODIUM SERPL-SCNC: 135 MMOL/L (ref 136–145)
SODIUM SERPL-SCNC: 136 MMOL/L (ref 136–145)
SODIUM SERPL-SCNC: 136 MMOL/L (ref 136–145)
SODIUM SERPL-SCNC: 137 MMOL/L (ref 136–145)
SODIUM SERPL-SCNC: 138 MMOL/L (ref 136–145)
SPECIMEN SOURCE FLD: ABNORMAL
THERAPEUTIC RANGE,PTTT: ABNORMAL SECS (ref 58–77)
TSH SERPL DL<=0.005 MIU/L-ACNC: 1.71 UIU/ML (ref 0.45–4.5)
WBC # BLD AUTO: 1.2 K/UL (ref 3.6–11)
WBC # BLD AUTO: 1.5 K/UL (ref 3.6–11)
WBC # BLD AUTO: 1.6 K/UL (ref 3.6–11)
WBC # BLD AUTO: 1.7 K/UL (ref 3.6–11)
WBC # BLD AUTO: 1.7 K/UL (ref 3.6–11)
WBC # BLD AUTO: 1.8 K/UL (ref 3.6–11)
WBC # BLD AUTO: 2.1 K/UL (ref 3.6–11)
WBC # BLD AUTO: 2.8 K/UL (ref 3.6–11)
WBC # BLD AUTO: 3.7 K/UL (ref 3.6–11)
WBC # BLD AUTO: 4 K/UL (ref 3.6–11)
WBC # BLD AUTO: 4.9 K/UL (ref 3.6–11)
WBC # BLD AUTO: 5.1 K/UL (ref 3.6–11)
WBC # BLD AUTO: 5.6 K/UL (ref 3.6–11)
WBC # BLD AUTO: 5.6 K/UL (ref 3.6–11)
WBC # BLD AUTO: 6 K/UL (ref 3.6–11)
WBC # BLD AUTO: 6.4 K/UL (ref 3.6–11)
WBC MORPH BLD: ABNORMAL

## 2022-01-01 PROCEDURE — 74011250636 HC RX REV CODE- 250/636: Performed by: INTERNAL MEDICINE

## 2022-01-01 PROCEDURE — 85025 COMPLETE CBC W/AUTO DIFF WBC: CPT

## 2022-01-01 PROCEDURE — 74011000636 HC RX REV CODE- 636: Performed by: EMERGENCY MEDICINE

## 2022-01-01 PROCEDURE — 74011250637 HC RX REV CODE- 250/637: Performed by: INTERNAL MEDICINE

## 2022-01-01 PROCEDURE — 88112 CYTOPATH CELL ENHANCE TECH: CPT

## 2022-01-01 PROCEDURE — G0299 HHS/HOSPICE OF RN EA 15 MIN: HCPCS

## 2022-01-01 PROCEDURE — 87205 SMEAR GRAM STAIN: CPT

## 2022-01-01 PROCEDURE — 36415 COLL VENOUS BLD VENIPUNCTURE: CPT

## 2022-01-01 PROCEDURE — HOSPICE MEDICATION HC HH HOSPICE MEDICATION

## 2022-01-01 PROCEDURE — 65270000029 HC RM PRIVATE

## 2022-01-01 PROCEDURE — 74011000258 HC RX REV CODE- 258: Performed by: INTERNAL MEDICINE

## 2022-01-01 PROCEDURE — 76705 ECHO EXAM OF ABDOMEN: CPT

## 2022-01-01 PROCEDURE — 74011000250 HC RX REV CODE- 250: Performed by: INTERNAL MEDICINE

## 2022-01-01 PROCEDURE — 97110 THERAPEUTIC EXERCISES: CPT

## 2022-01-01 PROCEDURE — 85027 COMPLETE CBC AUTOMATED: CPT

## 2022-01-01 PROCEDURE — 36591 DRAW BLOOD OFF VENOUS DEVICE: CPT

## 2022-01-01 PROCEDURE — 80053 COMPREHEN METABOLIC PANEL: CPT

## 2022-01-01 PROCEDURE — 97535 SELF CARE MNGMENT TRAINING: CPT

## 2022-01-01 PROCEDURE — 0651 HSPC ROUTINE HOME CARE

## 2022-01-01 PROCEDURE — 96376 TX/PRO/DX INJ SAME DRUG ADON: CPT | Performed by: EMERGENCY MEDICINE

## 2022-01-01 PROCEDURE — 73030 X-RAY EXAM OF SHOULDER: CPT

## 2022-01-01 PROCEDURE — G0300 HHS/HOSPICE OF LPN EA 15 MIN: HCPCS

## 2022-01-01 PROCEDURE — 84100 ASSAY OF PHOSPHORUS: CPT

## 2022-01-01 PROCEDURE — 97530 THERAPEUTIC ACTIVITIES: CPT

## 2022-01-01 PROCEDURE — 83605 ASSAY OF LACTIC ACID: CPT

## 2022-01-01 PROCEDURE — 94760 N-INVAS EAR/PLS OXIMETRY 1: CPT

## 2022-01-01 PROCEDURE — G0156 HHCP-SVS OF AIDE,EA 15 MIN: HCPCS

## 2022-01-01 PROCEDURE — 99214 OFFICE O/P EST MOD 30 MIN: CPT | Performed by: INTERNAL MEDICINE

## 2022-01-01 PROCEDURE — 97116 GAIT TRAINING THERAPY: CPT

## 2022-01-01 PROCEDURE — 89050 BODY FLUID CELL COUNT: CPT

## 2022-01-01 PROCEDURE — 49083 ABD PARACENTESIS W/IMAGING: CPT

## 2022-01-01 PROCEDURE — 70553 MRI BRAIN STEM W/O & W/DYE: CPT

## 2022-01-01 PROCEDURE — 74011250636 HC RX REV CODE- 250/636: Performed by: STUDENT IN AN ORGANIZED HEALTH CARE EDUCATION/TRAINING PROGRAM

## 2022-01-01 PROCEDURE — 87015 SPECIMEN INFECT AGNT CONCNTJ: CPT

## 2022-01-01 PROCEDURE — 74011250637 HC RX REV CODE- 250/637: Performed by: FAMILY MEDICINE

## 2022-01-01 PROCEDURE — 0W9G30Z DRAINAGE OF PERITONEAL CAVITY WITH DRAINAGE DEVICE, PERCUTANEOUS APPROACH: ICD-10-PCS | Performed by: RADIOLOGY

## 2022-01-01 PROCEDURE — 96374 THER/PROPH/DIAG INJ IV PUSH: CPT | Performed by: EMERGENCY MEDICINE

## 2022-01-01 PROCEDURE — 96361 HYDRATE IV INFUSION ADD-ON: CPT | Performed by: EMERGENCY MEDICINE

## 2022-01-01 PROCEDURE — 83735 ASSAY OF MAGNESIUM: CPT

## 2022-01-01 PROCEDURE — 97165 OT EVAL LOW COMPLEX 30 MIN: CPT

## 2022-01-01 PROCEDURE — 74177 CT ABD & PELVIS W/CONTRAST: CPT

## 2022-01-01 PROCEDURE — 97161 PT EVAL LOW COMPLEX 20 MIN: CPT

## 2022-01-01 PROCEDURE — 74011250636 HC RX REV CODE- 250/636: Performed by: EMERGENCY MEDICINE

## 2022-01-01 PROCEDURE — 77030003560 HC NDL HUBR BARD -A

## 2022-01-01 PROCEDURE — G0155 HHCP-SVS OF CSW,EA 15 MIN: HCPCS

## 2022-01-01 PROCEDURE — 87040 BLOOD CULTURE FOR BACTERIA: CPT

## 2022-01-01 PROCEDURE — 88305 TISSUE EXAM BY PATHOLOGIST: CPT

## 2022-01-01 PROCEDURE — 74011000250 HC RX REV CODE- 250: Performed by: RADIOLOGY

## 2022-01-01 PROCEDURE — 86300 IMMUNOASSAY TUMOR CA 15-3: CPT

## 2022-01-01 PROCEDURE — 3E04305 INTRODUCTION OF OTHER ANTINEOPLASTIC INTO CENTRAL VEIN, PERCUTANEOUS APPROACH: ICD-10-PCS | Performed by: INTERNAL MEDICINE

## 2022-01-01 PROCEDURE — 99285 EMERGENCY DEPT VISIT HI MDM: CPT | Performed by: EMERGENCY MEDICINE

## 2022-01-01 PROCEDURE — 2709999900 HC NON-CHARGEABLE SUPPLY

## 2022-01-01 PROCEDURE — A9576 INJ PROHANCE MULTIPACK: HCPCS | Performed by: INTERNAL MEDICINE

## 2022-01-01 PROCEDURE — 77030040831 HC BAG URINE DRNG MDII -A

## 2022-01-01 PROCEDURE — 85610 PROTHROMBIN TIME: CPT

## 2022-01-01 PROCEDURE — 51798 US URINE CAPACITY MEASURE: CPT

## 2022-01-01 PROCEDURE — 85730 THROMBOPLASTIN TIME PARTIAL: CPT

## 2022-01-01 PROCEDURE — 82248 BILIRUBIN DIRECT: CPT

## 2022-01-01 PROCEDURE — 77030018870 HC TY PARCNT BD -B

## 2022-01-01 RX ORDER — DEXAMETHASONE 4 MG/1
4 TABLET ORAL EVERY 12 HOURS
Status: COMPLETED | OUTPATIENT
Start: 2022-01-01 | End: 2022-01-01

## 2022-01-01 RX ORDER — LIDOCAINE HYDROCHLORIDE 10 MG/ML
20 INJECTION INFILTRATION; PERINEURAL
Status: COMPLETED | OUTPATIENT
Start: 2022-01-01 | End: 2022-01-01

## 2022-01-01 RX ORDER — FLUMAZENIL 0.1 MG/ML
0.5 INJECTION INTRAVENOUS
Status: DISCONTINUED | OUTPATIENT
Start: 2022-01-01 | End: 2022-01-01

## 2022-01-01 RX ORDER — HEPARIN 100 UNIT/ML
500 SYRINGE INTRAVENOUS AS NEEDED
Status: ACTIVE | OUTPATIENT
Start: 2022-01-01 | End: 2022-01-01

## 2022-01-01 RX ORDER — DIPHENHYDRAMINE HYDROCHLORIDE 50 MG/ML
50 INJECTION, SOLUTION INTRAMUSCULAR; INTRAVENOUS ONCE
Status: CANCELLED
Start: 2022-01-01 | End: 2022-01-01

## 2022-01-01 RX ORDER — LORAZEPAM 1 MG/1
1 TABLET ORAL
Status: DISCONTINUED | OUTPATIENT
Start: 2022-01-01 | End: 2022-01-01

## 2022-01-01 RX ORDER — DEXAMETHASONE SODIUM PHOSPHATE 4 MG/ML
20 INJECTION, SOLUTION INTRA-ARTICULAR; INTRALESIONAL; INTRAMUSCULAR; INTRAVENOUS; SOFT TISSUE ONCE
Status: DISCONTINUED | OUTPATIENT
Start: 2022-01-01 | End: 2022-01-01 | Stop reason: CLARIF

## 2022-01-01 RX ORDER — DIPHENHYDRAMINE HYDROCHLORIDE 50 MG/ML
50 INJECTION, SOLUTION INTRAMUSCULAR; INTRAVENOUS AS NEEDED
Status: CANCELLED
Start: 2022-01-01

## 2022-01-01 RX ORDER — PANTOPRAZOLE SODIUM 40 MG/1
40 TABLET, DELAYED RELEASE ORAL
Status: DISCONTINUED | OUTPATIENT
Start: 2022-01-01 | End: 2022-01-01 | Stop reason: HOSPADM

## 2022-01-01 RX ORDER — SODIUM CHLORIDE 9 MG/ML
25 INJECTION, SOLUTION INTRAVENOUS CONTINUOUS
Status: DISCONTINUED | OUTPATIENT
Start: 2022-01-01 | End: 2022-01-01

## 2022-01-01 RX ORDER — ONDANSETRON 2 MG/ML
8 INJECTION INTRAMUSCULAR; INTRAVENOUS AS NEEDED
Status: CANCELLED | OUTPATIENT
Start: 2022-01-01

## 2022-01-01 RX ORDER — EPINEPHRINE 1 MG/ML
0.3 INJECTION, SOLUTION, CONCENTRATE INTRAVENOUS AS NEEDED
Status: CANCELLED | OUTPATIENT
Start: 2022-01-01

## 2022-01-01 RX ORDER — DIPHENHYDRAMINE HYDROCHLORIDE 50 MG/ML
25 INJECTION, SOLUTION INTRAMUSCULAR; INTRAVENOUS AS NEEDED
Status: CANCELLED
Start: 2022-01-01

## 2022-01-01 RX ORDER — PREDNISONE 10 MG/1
TABLET ORAL AS NEEDED
COMMUNITY
End: 2022-01-01

## 2022-01-01 RX ORDER — MEGESTROL ACETATE 40 MG/ML
400 SUSPENSION ORAL DAILY
Status: DISCONTINUED | OUTPATIENT
Start: 2022-01-01 | End: 2022-01-01 | Stop reason: HOSPADM

## 2022-01-01 RX ORDER — ACETAMINOPHEN 325 MG/1
650 TABLET ORAL AS NEEDED
Status: CANCELLED
Start: 2022-01-01

## 2022-01-01 RX ORDER — LOPERAMIDE HYDROCHLORIDE 2 MG/1
2 CAPSULE ORAL
Status: DISCONTINUED | OUTPATIENT
Start: 2022-01-01 | End: 2022-01-01 | Stop reason: HOSPADM

## 2022-01-01 RX ORDER — ONDANSETRON 2 MG/ML
4 INJECTION INTRAMUSCULAR; INTRAVENOUS
Status: DISCONTINUED | OUTPATIENT
Start: 2022-01-01 | End: 2022-01-01

## 2022-01-01 RX ORDER — ALBUTEROL SULFATE 0.83 MG/ML
2.5 SOLUTION RESPIRATORY (INHALATION) AS NEEDED
Status: CANCELLED
Start: 2022-01-01

## 2022-01-01 RX ORDER — ONDANSETRON 2 MG/ML
4 INJECTION INTRAMUSCULAR; INTRAVENOUS
Status: DISCONTINUED | OUTPATIENT
Start: 2022-01-01 | End: 2022-01-01 | Stop reason: HOSPADM

## 2022-01-01 RX ORDER — SODIUM CHLORIDE 9 MG/ML
5-40 INJECTION INTRAMUSCULAR; INTRAVENOUS; SUBCUTANEOUS AS NEEDED
Status: CANCELLED | OUTPATIENT
Start: 2022-01-01

## 2022-01-01 RX ORDER — LEVOTHYROXINE SODIUM 75 UG/1
TABLET ORAL
Qty: 90 TABLET | Refills: 3 | Status: SHIPPED | OUTPATIENT
Start: 2022-01-01 | End: 2022-01-01 | Stop reason: SDUPTHER

## 2022-01-01 RX ORDER — ONDANSETRON 2 MG/ML
4 INJECTION INTRAMUSCULAR; INTRAVENOUS
Status: COMPLETED | OUTPATIENT
Start: 2022-01-01 | End: 2022-01-01

## 2022-01-01 RX ORDER — HYDROCORTISONE SODIUM SUCCINATE 100 MG/2ML
100 INJECTION, POWDER, FOR SOLUTION INTRAMUSCULAR; INTRAVENOUS AS NEEDED
Status: CANCELLED | OUTPATIENT
Start: 2022-01-01

## 2022-01-01 RX ORDER — SODIUM CHLORIDE 0.9 % (FLUSH) 0.9 %
5-40 SYRINGE (ML) INJECTION EVERY 8 HOURS
Status: DISCONTINUED | OUTPATIENT
Start: 2022-01-01 | End: 2022-01-01 | Stop reason: HOSPADM

## 2022-01-01 RX ORDER — EPINEPHRINE 1 MG/ML
0.3 INJECTION, SOLUTION, CONCENTRATE INTRAVENOUS AS NEEDED
Status: DISCONTINUED | OUTPATIENT
Start: 2022-01-01 | End: 2022-01-01

## 2022-01-01 RX ORDER — CALCIUM GLUCONATE 94 MG/ML
1 INJECTION, SOLUTION INTRAVENOUS ONCE
Status: DISCONTINUED | OUTPATIENT
Start: 2022-01-01 | End: 2022-01-01

## 2022-01-01 RX ORDER — DEXAMETHASONE SODIUM PHOSPHATE 100 MG/10ML
10 INJECTION INTRAMUSCULAR; INTRAVENOUS ONCE
Status: CANCELLED | OUTPATIENT
Start: 2022-01-01 | End: 2022-01-01

## 2022-01-01 RX ORDER — DEXAMETHASONE IN 0.9 % SOD CHL 20 MG/50ML
20 INTRAVENOUS SOLUTION, PIGGYBACK (ML) INTRAVENOUS ONCE
Status: DISCONTINUED | OUTPATIENT
Start: 2022-01-01 | End: 2022-01-01 | Stop reason: CLARIF

## 2022-01-01 RX ORDER — CAPECITABINE 500 MG/1
TABLET, FILM COATED ORAL
COMMUNITY
Start: 2022-01-01 | End: 2022-01-01

## 2022-01-01 RX ORDER — LEVOTHYROXINE SODIUM 75 UG/1
TABLET ORAL
COMMUNITY
End: 2022-01-01 | Stop reason: SDUPTHER

## 2022-01-01 RX ORDER — ALBUTEROL SULFATE 0.83 MG/ML
2.5 SOLUTION RESPIRATORY (INHALATION) AS NEEDED
Status: ACTIVE | OUTPATIENT
Start: 2022-01-01 | End: 2022-01-01

## 2022-01-01 RX ORDER — ALBUMIN HUMAN 250 G/1000ML
12.5 SOLUTION INTRAVENOUS ONCE
Status: ACTIVE | OUTPATIENT
Start: 2022-01-01 | End: 2022-01-01

## 2022-01-01 RX ORDER — SODIUM CHLORIDE 9 MG/ML
5-250 INJECTION, SOLUTION INTRAVENOUS AS NEEDED
Status: DISCONTINUED | OUTPATIENT
Start: 2022-01-01 | End: 2022-01-01

## 2022-01-01 RX ORDER — TRAZODONE HYDROCHLORIDE 50 MG/1
TABLET ORAL
COMMUNITY
Start: 2022-01-01

## 2022-01-01 RX ORDER — SODIUM CHLORIDE 9 MG/ML
75 INJECTION, SOLUTION INTRAVENOUS CONTINUOUS
Status: DISCONTINUED | OUTPATIENT
Start: 2022-01-01 | End: 2022-01-01

## 2022-01-01 RX ORDER — HYDROCORTISONE SODIUM SUCCINATE 100 MG/2ML
100 INJECTION, POWDER, FOR SOLUTION INTRAMUSCULAR; INTRAVENOUS AS NEEDED
Status: ACTIVE | OUTPATIENT
Start: 2022-01-01 | End: 2022-01-01

## 2022-01-01 RX ORDER — FENTANYL CITRATE 50 UG/ML
12.5-2 INJECTION, SOLUTION INTRAMUSCULAR; INTRAVENOUS
Status: DISCONTINUED | OUTPATIENT
Start: 2022-01-01 | End: 2022-01-01

## 2022-01-01 RX ORDER — CALCIUM GLUCONATE 20 MG/ML
1 INJECTION, SOLUTION INTRAVENOUS ONCE
Status: COMPLETED | OUTPATIENT
Start: 2022-01-01 | End: 2022-01-01

## 2022-01-01 RX ORDER — MIDAZOLAM HYDROCHLORIDE 1 MG/ML
.5-5 INJECTION, SOLUTION INTRAMUSCULAR; INTRAVENOUS
Status: DISCONTINUED | OUTPATIENT
Start: 2022-01-01 | End: 2022-01-01

## 2022-01-01 RX ORDER — ONDANSETRON 2 MG/ML
8 INJECTION INTRAMUSCULAR; INTRAVENOUS AS NEEDED
Status: ACTIVE | OUTPATIENT
Start: 2022-01-01 | End: 2022-01-01

## 2022-01-01 RX ORDER — DEXTROSE, SODIUM CHLORIDE, SODIUM LACTATE, POTASSIUM CHLORIDE, AND CALCIUM CHLORIDE 5; .6; .31; .03; .02 G/100ML; G/100ML; G/100ML; G/100ML; G/100ML
100 INJECTION, SOLUTION INTRAVENOUS CONTINUOUS
Status: DISCONTINUED | OUTPATIENT
Start: 2022-01-01 | End: 2022-01-01 | Stop reason: HOSPADM

## 2022-01-01 RX ORDER — LOPERAMIDE HYDROCHLORIDE 2 MG/1
2 CAPSULE ORAL ONCE
Status: DISCONTINUED | OUTPATIENT
Start: 2022-01-01 | End: 2022-01-01

## 2022-01-01 RX ORDER — DEXAMETHASONE 4 MG/1
TABLET ORAL AS NEEDED
COMMUNITY
End: 2022-01-01

## 2022-01-01 RX ORDER — DEXAMETHASONE SODIUM PHOSPHATE 4 MG/ML
4 INJECTION, SOLUTION INTRA-ARTICULAR; INTRALESIONAL; INTRAMUSCULAR; INTRAVENOUS; SOFT TISSUE EVERY 6 HOURS
Status: DISCONTINUED | OUTPATIENT
Start: 2022-01-01 | End: 2022-01-01 | Stop reason: HOSPADM

## 2022-01-01 RX ORDER — HEPARIN 100 UNIT/ML
500 SYRINGE INTRAVENOUS AS NEEDED
Status: CANCELLED
Start: 2022-01-01

## 2022-01-01 RX ORDER — SODIUM CHLORIDE 9 MG/ML
5-40 INJECTION INTRAMUSCULAR; INTRAVENOUS; SUBCUTANEOUS AS NEEDED
Status: DISCONTINUED | OUTPATIENT
Start: 2022-01-01 | End: 2022-01-01

## 2022-01-01 RX ORDER — PROCHLORPERAZINE EDISYLATE 5 MG/ML
5 INJECTION INTRAMUSCULAR; INTRAVENOUS
Status: DISCONTINUED | OUTPATIENT
Start: 2022-01-01 | End: 2022-01-01 | Stop reason: HOSPADM

## 2022-01-01 RX ORDER — SODIUM CHLORIDE 9 MG/ML
5-250 INJECTION, SOLUTION INTRAVENOUS AS NEEDED
Status: CANCELLED | OUTPATIENT
Start: 2022-01-01

## 2022-01-01 RX ORDER — DEXAMETHASONE 4 MG/1
20 TABLET ORAL ONCE
Status: COMPLETED | OUTPATIENT
Start: 2022-01-01 | End: 2022-01-01

## 2022-01-01 RX ORDER — FLUCONAZOLE 100 MG/1
TABLET ORAL AS NEEDED
COMMUNITY
Start: 2022-01-01

## 2022-01-01 RX ORDER — MAG HYDROX/ALUMINUM HYD/SIMETH 200-200-20
30 SUSPENSION, ORAL (FINAL DOSE FORM) ORAL
Status: DISCONTINUED | OUTPATIENT
Start: 2022-01-01 | End: 2022-01-01 | Stop reason: HOSPADM

## 2022-01-01 RX ORDER — SODIUM CHLORIDE 0.9 % (FLUSH) 0.9 %
5-40 SYRINGE (ML) INJECTION AS NEEDED
Status: CANCELLED | OUTPATIENT
Start: 2022-01-01

## 2022-01-01 RX ORDER — LOPERAMIDE HYDROCHLORIDE 2 MG/1
2 CAPSULE ORAL ONCE
Status: DISPENSED | OUTPATIENT
Start: 2022-01-01 | End: 2022-01-01

## 2022-01-01 RX ORDER — SODIUM CHLORIDE 9 MG/ML
150 INJECTION, SOLUTION INTRAVENOUS CONTINUOUS
Status: DISCONTINUED | OUTPATIENT
Start: 2022-01-01 | End: 2022-01-01

## 2022-01-01 RX ORDER — OXYCODONE HYDROCHLORIDE 5 MG/1
5 TABLET ORAL
Status: DISCONTINUED | OUTPATIENT
Start: 2022-01-01 | End: 2022-01-01 | Stop reason: HOSPADM

## 2022-01-01 RX ORDER — SODIUM CHLORIDE 0.9 % (FLUSH) 0.9 %
5-40 SYRINGE (ML) INJECTION AS NEEDED
Status: DISCONTINUED | OUTPATIENT
Start: 2022-01-01 | End: 2022-01-01 | Stop reason: HOSPADM

## 2022-01-01 RX ORDER — SODIUM CHLORIDE 0.9 % (FLUSH) 0.9 %
5-40 SYRINGE (ML) INJECTION AS NEEDED
Status: DISPENSED | OUTPATIENT
Start: 2022-01-01 | End: 2022-01-01

## 2022-01-01 RX ORDER — LEVOTHYROXINE SODIUM 75 UG/1
TABLET ORAL
Qty: 90 TABLET | Refills: 3 | Status: SHIPPED | OUTPATIENT
Start: 2022-01-01

## 2022-01-01 RX ORDER — HEPARIN 100 UNIT/ML
500 SYRINGE INTRAVENOUS AS NEEDED
Status: DISCONTINUED | OUTPATIENT
Start: 2022-01-01 | End: 2022-01-01 | Stop reason: HOSPADM

## 2022-01-01 RX ORDER — METOCLOPRAMIDE 10 MG/1
10 TABLET ORAL
Status: DISCONTINUED | OUTPATIENT
Start: 2022-01-01 | End: 2022-01-01

## 2022-01-01 RX ORDER — DIPHENHYDRAMINE HYDROCHLORIDE 50 MG/ML
50 INJECTION, SOLUTION INTRAMUSCULAR; INTRAVENOUS AS NEEDED
Status: ACTIVE | OUTPATIENT
Start: 2022-01-01 | End: 2022-01-01

## 2022-01-01 RX ORDER — TRAZODONE HYDROCHLORIDE 50 MG/1
50 TABLET ORAL
Status: DISCONTINUED | OUTPATIENT
Start: 2022-01-01 | End: 2022-01-01 | Stop reason: HOSPADM

## 2022-01-01 RX ORDER — DENOSUMAB 120 MG/1.7ML
INJECTION SUBCUTANEOUS
COMMUNITY
End: 2022-01-01

## 2022-01-01 RX ORDER — NALOXONE HYDROCHLORIDE 1 MG/ML
0.4 INJECTION INTRAMUSCULAR; INTRAVENOUS; SUBCUTANEOUS
Status: DISCONTINUED | OUTPATIENT
Start: 2022-01-01 | End: 2022-01-01

## 2022-01-01 RX ORDER — LEVOTHYROXINE SODIUM 75 UG/1
75 TABLET ORAL
Status: DISCONTINUED | OUTPATIENT
Start: 2022-01-01 | End: 2022-01-01 | Stop reason: HOSPADM

## 2022-01-01 RX ORDER — PALONOSETRON 0.05 MG/ML
0.25 INJECTION, SOLUTION INTRAVENOUS ONCE
Status: COMPLETED | OUTPATIENT
Start: 2022-01-01 | End: 2022-01-01

## 2022-01-01 RX ORDER — HEPARIN 100 UNIT/ML
300 SYRINGE INTRAVENOUS
Status: COMPLETED | OUTPATIENT
Start: 2022-01-01 | End: 2022-01-01

## 2022-01-01 RX ORDER — LIDOCAINE HYDROCHLORIDE 10 MG/ML
INJECTION INFILTRATION; PERINEURAL
Status: DISPENSED
Start: 2022-01-01 | End: 2022-01-01

## 2022-01-01 RX ORDER — ACETAMINOPHEN 325 MG/1
650 TABLET ORAL AS NEEDED
Status: ACTIVE | OUTPATIENT
Start: 2022-01-01 | End: 2022-01-01

## 2022-01-01 RX ORDER — DIPHENHYDRAMINE HYDROCHLORIDE 50 MG/ML
25 INJECTION, SOLUTION INTRAMUSCULAR; INTRAVENOUS AS NEEDED
Status: ACTIVE | OUTPATIENT
Start: 2022-01-01 | End: 2022-01-01

## 2022-01-01 RX ORDER — OMEPRAZOLE 20 MG/1
20 CAPSULE, DELAYED RELEASE ORAL DAILY
COMMUNITY
Start: 2022-01-01 | End: 2022-01-01 | Stop reason: ALTCHOICE

## 2022-01-01 RX ORDER — ENOXAPARIN SODIUM 100 MG/ML
40 INJECTION SUBCUTANEOUS EVERY 24 HOURS
Status: DISCONTINUED | OUTPATIENT
Start: 2022-01-01 | End: 2022-01-01 | Stop reason: HOSPADM

## 2022-01-01 RX ORDER — ONDANSETRON 2 MG/ML
4 INJECTION INTRAMUSCULAR; INTRAVENOUS EVERY 12 HOURS
Status: DISCONTINUED | OUTPATIENT
Start: 2022-01-01 | End: 2022-01-01

## 2022-01-01 RX ORDER — DIPHENHYDRAMINE HYDROCHLORIDE 50 MG/ML
50 INJECTION, SOLUTION INTRAMUSCULAR; INTRAVENOUS ONCE
Status: COMPLETED | OUTPATIENT
Start: 2022-01-01 | End: 2022-01-01

## 2022-01-01 RX ADMIN — PIPERACILLIN AND TAZOBACTAM 3.38 G: 3; .375 INJECTION, POWDER, FOR SOLUTION INTRAVENOUS at 00:17

## 2022-01-01 RX ADMIN — SODIUM CHLORIDE, PRESERVATIVE FREE 10 ML: 5 INJECTION INTRAVENOUS at 13:36

## 2022-01-01 RX ADMIN — ONDANSETRON 4 MG: 2 INJECTION INTRAMUSCULAR; INTRAVENOUS at 23:46

## 2022-01-01 RX ADMIN — SODIUM CHLORIDE, SODIUM LACTATE, POTASSIUM CHLORIDE, CALCIUM CHLORIDE AND DEXTROSE MONOHYDRATE 100 ML/HR: 5; 600; 310; 30; 20 INJECTION, SOLUTION INTRAVENOUS at 11:20

## 2022-01-01 RX ADMIN — DEXAMETHASONE 20 MG: 4 TABLET ORAL at 18:53

## 2022-01-01 RX ADMIN — SODIUM CHLORIDE, PRESERVATIVE FREE 40 ML: 5 INJECTION INTRAVENOUS at 03:38

## 2022-01-01 RX ADMIN — SODIUM CHLORIDE 150 ML/HR: 9 INJECTION, SOLUTION INTRAVENOUS at 10:04

## 2022-01-01 RX ADMIN — CALCIUM GLUCONATE 1000 MG: 20 INJECTION, SOLUTION INTRAVENOUS at 08:15

## 2022-01-01 RX ADMIN — PANTOPRAZOLE SODIUM 40 MG: 40 TABLET, DELAYED RELEASE ORAL at 06:45

## 2022-01-01 RX ADMIN — METOCLOPRAMIDE 10 MG: 10 TABLET ORAL at 21:55

## 2022-01-01 RX ADMIN — LOPERAMIDE HYDROCHLORIDE 2 MG: 2 CAPSULE ORAL at 11:39

## 2022-01-01 RX ADMIN — SODIUM CHLORIDE, PRESERVATIVE FREE 10 ML: 5 INJECTION INTRAVENOUS at 14:55

## 2022-01-01 RX ADMIN — DEXAMETHASONE SODIUM PHOSPHATE 4 MG: 4 INJECTION, SOLUTION INTRAMUSCULAR; INTRAVENOUS at 00:17

## 2022-01-01 RX ADMIN — DEXAMETHASONE SODIUM PHOSPHATE 4 MG: 4 INJECTION, SOLUTION INTRAMUSCULAR; INTRAVENOUS at 06:49

## 2022-01-01 RX ADMIN — DEXAMETHASONE SODIUM PHOSPHATE 4 MG: 4 INJECTION, SOLUTION INTRAMUSCULAR; INTRAVENOUS at 17:22

## 2022-01-01 RX ADMIN — SODIUM CHLORIDE, SODIUM LACTATE, POTASSIUM CHLORIDE, CALCIUM CHLORIDE AND DEXTROSE MONOHYDRATE 100 ML/HR: 5; 600; 310; 30; 20 INJECTION, SOLUTION INTRAVENOUS at 22:56

## 2022-01-01 RX ADMIN — LOPERAMIDE HYDROCHLORIDE 2 MG: 2 CAPSULE ORAL at 20:50

## 2022-01-01 RX ADMIN — ENOXAPARIN SODIUM 40 MG: 100 INJECTION SUBCUTANEOUS at 16:38

## 2022-01-01 RX ADMIN — SODIUM CHLORIDE, PRESERVATIVE FREE 10 ML: 5 INJECTION INTRAVENOUS at 06:25

## 2022-01-01 RX ADMIN — METOCLOPRAMIDE 10 MG: 10 TABLET ORAL at 07:16

## 2022-01-01 RX ADMIN — ONDANSETRON 4 MG: 2 INJECTION INTRAMUSCULAR; INTRAVENOUS at 00:45

## 2022-01-01 RX ADMIN — DEXAMETHASONE SODIUM PHOSPHATE 4 MG: 4 INJECTION, SOLUTION INTRAMUSCULAR; INTRAVENOUS at 17:20

## 2022-01-01 RX ADMIN — DEXAMETHASONE SODIUM PHOSPHATE 4 MG: 4 INJECTION, SOLUTION INTRAMUSCULAR; INTRAVENOUS at 06:18

## 2022-01-01 RX ADMIN — METOCLOPRAMIDE 10 MG: 10 TABLET ORAL at 11:05

## 2022-01-01 RX ADMIN — LOPERAMIDE HYDROCHLORIDE 2 MG: 2 CAPSULE ORAL at 12:19

## 2022-01-01 RX ADMIN — SODIUM CHLORIDE, SODIUM LACTATE, POTASSIUM CHLORIDE, CALCIUM CHLORIDE AND DEXTROSE MONOHYDRATE 100 ML/HR: 5; 600; 310; 30; 20 INJECTION, SOLUTION INTRAVENOUS at 09:35

## 2022-01-01 RX ADMIN — ENOXAPARIN SODIUM 40 MG: 100 INJECTION SUBCUTANEOUS at 18:02

## 2022-01-01 RX ADMIN — MEGESTROL ACETATE 400 MG: 40 SUSPENSION ORAL at 08:34

## 2022-01-01 RX ADMIN — LEVOTHYROXINE SODIUM 75 MCG: 0.07 TABLET ORAL at 06:45

## 2022-01-01 RX ADMIN — DEXAMETHASONE SODIUM PHOSPHATE 4 MG: 4 INJECTION, SOLUTION INTRAMUSCULAR; INTRAVENOUS at 12:29

## 2022-01-01 RX ADMIN — DEXAMETHASONE SODIUM PHOSPHATE 4 MG: 4 INJECTION, SOLUTION INTRAMUSCULAR; INTRAVENOUS at 11:19

## 2022-01-01 RX ADMIN — SODIUM CHLORIDE, PRESERVATIVE FREE 10 ML: 5 INJECTION INTRAVENOUS at 05:30

## 2022-01-01 RX ADMIN — DEXAMETHASONE SODIUM PHOSPHATE 4 MG: 4 INJECTION, SOLUTION INTRAMUSCULAR; INTRAVENOUS at 17:23

## 2022-01-01 RX ADMIN — DEXAMETHASONE 4 MG: 4 TABLET ORAL at 09:14

## 2022-01-01 RX ADMIN — FAMOTIDINE 20 MG: 10 INJECTION, SOLUTION INTRAVENOUS at 09:06

## 2022-01-01 RX ADMIN — ONDANSETRON 4 MG: 2 INJECTION INTRAMUSCULAR; INTRAVENOUS at 10:31

## 2022-01-01 RX ADMIN — DEXAMETHASONE SODIUM PHOSPHATE 4 MG: 4 INJECTION, SOLUTION INTRAMUSCULAR; INTRAVENOUS at 00:15

## 2022-01-01 RX ADMIN — ENOXAPARIN SODIUM 40 MG: 100 INJECTION SUBCUTANEOUS at 17:21

## 2022-01-01 RX ADMIN — ONDANSETRON 4 MG: 2 INJECTION INTRAMUSCULAR; INTRAVENOUS at 11:58

## 2022-01-01 RX ADMIN — GADOTERIDOL 12 ML: 279.3 INJECTION, SOLUTION INTRAVENOUS at 18:27

## 2022-01-01 RX ADMIN — DEXAMETHASONE SODIUM PHOSPHATE 4 MG: 4 INJECTION, SOLUTION INTRAMUSCULAR; INTRAVENOUS at 13:02

## 2022-01-01 RX ADMIN — ALUMINUM HYDROXIDE, MAGNESIUM HYDROXIDE, AND SIMETHICONE 30 ML: 200; 200; 20 SUSPENSION ORAL at 09:43

## 2022-01-01 RX ADMIN — MEGESTROL ACETATE 400 MG: 40 SUSPENSION ORAL at 09:42

## 2022-01-01 RX ADMIN — MEGESTROL ACETATE 400 MG: 40 SUSPENSION ORAL at 18:40

## 2022-01-01 RX ADMIN — TRAZODONE HYDROCHLORIDE 50 MG: 50 TABLET ORAL at 03:28

## 2022-01-01 RX ADMIN — DEXAMETHASONE 4 MG: 4 TABLET ORAL at 21:55

## 2022-01-01 RX ADMIN — SODIUM CHLORIDE, PRESERVATIVE FREE 10 ML: 5 INJECTION INTRAVENOUS at 17:13

## 2022-01-01 RX ADMIN — LEVOTHYROXINE SODIUM 75 MCG: 0.07 TABLET ORAL at 07:16

## 2022-01-01 RX ADMIN — ENOXAPARIN SODIUM 40 MG: 100 INJECTION SUBCUTANEOUS at 16:51

## 2022-01-01 RX ADMIN — DEXAMETHASONE SODIUM PHOSPHATE 4 MG: 4 INJECTION, SOLUTION INTRAMUSCULAR; INTRAVENOUS at 06:28

## 2022-01-01 RX ADMIN — LIDOCAINE HYDROCHLORIDE 20 ML: 10 INJECTION, SOLUTION INFILTRATION; PERINEURAL at 15:00

## 2022-01-01 RX ADMIN — MEGESTROL ACETATE 400 MG: 40 SUSPENSION ORAL at 08:53

## 2022-01-01 RX ADMIN — PIPERACILLIN AND TAZOBACTAM 3.38 G: 3; .375 INJECTION, POWDER, FOR SOLUTION INTRAVENOUS at 16:20

## 2022-01-01 RX ADMIN — PIPERACILLIN AND TAZOBACTAM 3.38 G: 3; .375 INJECTION, POWDER, FOR SOLUTION INTRAVENOUS at 07:16

## 2022-01-01 RX ADMIN — SODIUM CHLORIDE, SODIUM LACTATE, POTASSIUM CHLORIDE, CALCIUM CHLORIDE AND DEXTROSE MONOHYDRATE 100 ML/HR: 5; 600; 310; 30; 20 INJECTION, SOLUTION INTRAVENOUS at 13:21

## 2022-01-01 RX ADMIN — METOCLOPRAMIDE 10 MG: 10 TABLET ORAL at 16:56

## 2022-01-01 RX ADMIN — Medication 300 UNITS: at 02:20

## 2022-01-01 RX ADMIN — SODIUM CHLORIDE, SODIUM LACTATE, POTASSIUM CHLORIDE, CALCIUM CHLORIDE AND DEXTROSE MONOHYDRATE 100 ML/HR: 5; 600; 310; 30; 20 INJECTION, SOLUTION INTRAVENOUS at 02:08

## 2022-01-01 RX ADMIN — ONDANSETRON 4 MG: 2 INJECTION INTRAMUSCULAR; INTRAVENOUS at 19:44

## 2022-01-01 RX ADMIN — SODIUM CHLORIDE 150 MG: 900 INJECTION, SOLUTION INTRAVENOUS at 09:00

## 2022-01-01 RX ADMIN — SODIUM CHLORIDE, PRESERVATIVE FREE 10 ML: 5 INJECTION INTRAVENOUS at 06:46

## 2022-01-01 RX ADMIN — DEXAMETHASONE SODIUM PHOSPHATE 4 MG: 4 INJECTION, SOLUTION INTRAMUSCULAR; INTRAVENOUS at 12:53

## 2022-01-01 RX ADMIN — ONDANSETRON 4 MG: 2 INJECTION INTRAMUSCULAR; INTRAVENOUS at 03:38

## 2022-01-01 RX ADMIN — SODIUM CHLORIDE, PRESERVATIVE FREE 10 ML: 5 INJECTION INTRAVENOUS at 13:02

## 2022-01-01 RX ADMIN — DIPHENHYDRAMINE HYDROCHLORIDE 50 MG: 50 INJECTION INTRAMUSCULAR; INTRAVENOUS at 09:06

## 2022-01-01 RX ADMIN — METOCLOPRAMIDE 10 MG: 10 TABLET ORAL at 11:58

## 2022-01-01 RX ADMIN — DEXAMETHASONE SODIUM PHOSPHATE 12 MG: 4 INJECTION, SOLUTION INTRAMUSCULAR; INTRAVENOUS at 09:00

## 2022-01-01 RX ADMIN — LEVOTHYROXINE SODIUM 75 MCG: 0.07 TABLET ORAL at 08:14

## 2022-01-01 RX ADMIN — SODIUM CHLORIDE, PRESERVATIVE FREE 10 ML: 5 INJECTION INTRAVENOUS at 22:05

## 2022-01-01 RX ADMIN — DEXAMETHASONE SODIUM PHOSPHATE 4 MG: 4 INJECTION, SOLUTION INTRAMUSCULAR; INTRAVENOUS at 07:46

## 2022-01-01 RX ADMIN — TRAZODONE HYDROCHLORIDE 50 MG: 50 TABLET ORAL at 20:56

## 2022-01-01 RX ADMIN — DEXAMETHASONE SODIUM PHOSPHATE 4 MG: 4 INJECTION, SOLUTION INTRAMUSCULAR; INTRAVENOUS at 12:19

## 2022-01-01 RX ADMIN — SODIUM CHLORIDE, SODIUM LACTATE, POTASSIUM CHLORIDE, CALCIUM CHLORIDE AND DEXTROSE MONOHYDRATE 100 ML/HR: 5; 600; 310; 30; 20 INJECTION, SOLUTION INTRAVENOUS at 03:28

## 2022-01-01 RX ADMIN — SODIUM CHLORIDE, PRESERVATIVE FREE 10 ML: 5 INJECTION INTRAVENOUS at 07:13

## 2022-01-01 RX ADMIN — ENOXAPARIN SODIUM 40 MG: 100 INJECTION SUBCUTANEOUS at 17:22

## 2022-01-01 RX ADMIN — SODIUM CHLORIDE, SODIUM LACTATE, POTASSIUM CHLORIDE, CALCIUM CHLORIDE AND DEXTROSE MONOHYDRATE 100 ML/HR: 5; 600; 310; 30; 20 INJECTION, SOLUTION INTRAVENOUS at 01:54

## 2022-01-01 RX ADMIN — SODIUM CHLORIDE, PRESERVATIVE FREE 10 ML: 5 INJECTION INTRAVENOUS at 12:53

## 2022-01-01 RX ADMIN — ENOXAPARIN SODIUM 40 MG: 100 INJECTION SUBCUTANEOUS at 16:57

## 2022-01-01 RX ADMIN — MEGESTROL ACETATE 400 MG: 40 SUSPENSION ORAL at 09:09

## 2022-01-01 RX ADMIN — SODIUM CHLORIDE 75 ML/HR: 9 INJECTION, SOLUTION INTRAVENOUS at 12:54

## 2022-01-01 RX ADMIN — SODIUM CHLORIDE, PRESERVATIVE FREE 10 ML: 5 INJECTION INTRAVENOUS at 06:49

## 2022-01-01 RX ADMIN — LEVOTHYROXINE SODIUM 75 MCG: 0.07 TABLET ORAL at 06:32

## 2022-01-01 RX ADMIN — PIPERACILLIN AND TAZOBACTAM 3.38 G: 3; .375 INJECTION, POWDER, FOR SOLUTION INTRAVENOUS at 00:27

## 2022-01-01 RX ADMIN — DEXAMETHASONE SODIUM PHOSPHATE 4 MG: 4 INJECTION, SOLUTION INTRAMUSCULAR; INTRAVENOUS at 00:41

## 2022-01-01 RX ADMIN — CALCIUM GLUCONATE 1000 MG: 20 INJECTION, SOLUTION INTRAVENOUS at 12:53

## 2022-01-01 RX ADMIN — MEGESTROL ACETATE 400 MG: 40 SUSPENSION ORAL at 09:08

## 2022-01-01 RX ADMIN — SODIUM CHLORIDE, PRESERVATIVE FREE 10 ML: 5 INJECTION INTRAVENOUS at 06:19

## 2022-01-01 RX ADMIN — SODIUM CHLORIDE, PRESERVATIVE FREE 10 ML: 5 INJECTION INTRAVENOUS at 17:21

## 2022-01-01 RX ADMIN — METOCLOPRAMIDE 10 MG: 10 TABLET ORAL at 16:51

## 2022-01-01 RX ADMIN — SODIUM CHLORIDE, PRESERVATIVE FREE 10 ML: 5 INJECTION INTRAVENOUS at 20:20

## 2022-01-01 RX ADMIN — CARBOPLATIN 538 MG: 10 INJECTION, SOLUTION INTRAVENOUS at 11:34

## 2022-01-01 RX ADMIN — LOPERAMIDE HYDROCHLORIDE 2 MG: 2 CAPSULE ORAL at 21:55

## 2022-01-01 RX ADMIN — METOCLOPRAMIDE 10 MG: 10 TABLET ORAL at 06:51

## 2022-01-01 RX ADMIN — SODIUM CHLORIDE 75 ML/HR: 9 INJECTION, SOLUTION INTRAVENOUS at 17:17

## 2022-01-01 RX ADMIN — DEXAMETHASONE 4 MG: 4 TABLET ORAL at 09:08

## 2022-01-01 RX ADMIN — METOCLOPRAMIDE 10 MG: 10 TABLET ORAL at 07:39

## 2022-01-01 RX ADMIN — SODIUM CHLORIDE, SODIUM LACTATE, POTASSIUM CHLORIDE, CALCIUM CHLORIDE AND DEXTROSE MONOHYDRATE 100 ML/HR: 5; 600; 310; 30; 20 INJECTION, SOLUTION INTRAVENOUS at 22:39

## 2022-01-01 RX ADMIN — SODIUM CHLORIDE, SODIUM LACTATE, POTASSIUM CHLORIDE, CALCIUM CHLORIDE AND DEXTROSE MONOHYDRATE 100 ML/HR: 5; 600; 310; 30; 20 INJECTION, SOLUTION INTRAVENOUS at 20:17

## 2022-01-01 RX ADMIN — SODIUM CHLORIDE, PRESERVATIVE FREE 10 ML: 5 INJECTION INTRAVENOUS at 07:47

## 2022-01-01 RX ADMIN — DEXAMETHASONE SODIUM PHOSPHATE 4 MG: 4 INJECTION, SOLUTION INTRAMUSCULAR; INTRAVENOUS at 23:35

## 2022-01-01 RX ADMIN — PANTOPRAZOLE SODIUM 40 MG: 40 TABLET, DELAYED RELEASE ORAL at 07:00

## 2022-01-01 RX ADMIN — SODIUM CHLORIDE, SODIUM LACTATE, POTASSIUM CHLORIDE, CALCIUM CHLORIDE AND DEXTROSE MONOHYDRATE 100 ML/HR: 5; 600; 310; 30; 20 INJECTION, SOLUTION INTRAVENOUS at 15:52

## 2022-01-01 RX ADMIN — LEVOTHYROXINE SODIUM 75 MCG: 0.07 TABLET ORAL at 06:59

## 2022-01-01 RX ADMIN — LORAZEPAM 1 MG: 1 TABLET ORAL at 22:04

## 2022-01-01 RX ADMIN — SODIUM CHLORIDE, SODIUM LACTATE, POTASSIUM CHLORIDE, CALCIUM CHLORIDE AND DEXTROSE MONOHYDRATE 100 ML/HR: 5; 600; 310; 30; 20 INJECTION, SOLUTION INTRAVENOUS at 13:35

## 2022-01-01 RX ADMIN — PACLITAXEL 63 MG: 6 INJECTION, SOLUTION INTRAVENOUS at 10:25

## 2022-01-01 RX ADMIN — METOCLOPRAMIDE 10 MG: 10 TABLET ORAL at 20:50

## 2022-01-01 RX ADMIN — LEVOTHYROXINE SODIUM 75 MCG: 0.07 TABLET ORAL at 06:56

## 2022-01-01 RX ADMIN — DEXAMETHASONE SODIUM PHOSPHATE 4 MG: 4 INJECTION, SOLUTION INTRAMUSCULAR; INTRAVENOUS at 23:47

## 2022-01-01 RX ADMIN — LEVOTHYROXINE SODIUM 75 MCG: 0.07 TABLET ORAL at 06:18

## 2022-01-01 RX ADMIN — DEXAMETHASONE 4 MG: 4 TABLET ORAL at 09:24

## 2022-01-01 RX ADMIN — DEXAMETHASONE SODIUM PHOSPHATE 4 MG: 4 INJECTION, SOLUTION INTRAMUSCULAR; INTRAVENOUS at 06:45

## 2022-01-01 RX ADMIN — SODIUM CHLORIDE, PRESERVATIVE FREE 10 ML: 5 INJECTION INTRAVENOUS at 22:14

## 2022-01-01 RX ADMIN — SODIUM CHLORIDE, PRESERVATIVE FREE 10 ML: 5 INJECTION INTRAVENOUS at 06:57

## 2022-01-01 RX ADMIN — SODIUM CHLORIDE, PRESERVATIVE FREE 10 ML: 5 INJECTION INTRAVENOUS at 10:31

## 2022-01-01 RX ADMIN — LOPERAMIDE HYDROCHLORIDE 2 MG: 2 CAPSULE ORAL at 22:14

## 2022-01-01 RX ADMIN — PANTOPRAZOLE SODIUM 40 MG: 40 TABLET, DELAYED RELEASE ORAL at 06:18

## 2022-01-01 RX ADMIN — LEVOTHYROXINE SODIUM 75 MCG: 0.07 TABLET ORAL at 07:13

## 2022-01-01 RX ADMIN — SODIUM CHLORIDE, PRESERVATIVE FREE 10 ML: 5 INJECTION INTRAVENOUS at 12:29

## 2022-01-01 RX ADMIN — DEXAMETHASONE SODIUM PHOSPHATE 4 MG: 4 INJECTION, SOLUTION INTRAMUSCULAR; INTRAVENOUS at 17:43

## 2022-01-01 RX ADMIN — ENOXAPARIN SODIUM 40 MG: 100 INJECTION SUBCUTANEOUS at 17:09

## 2022-01-01 RX ADMIN — CALCIUM GLUCONATE 1000 MG: 20 INJECTION, SOLUTION INTRAVENOUS at 05:30

## 2022-01-01 RX ADMIN — SODIUM CHLORIDE, PRESERVATIVE FREE 10 ML: 5 INJECTION INTRAVENOUS at 21:16

## 2022-01-01 RX ADMIN — SODIUM CHLORIDE, SODIUM LACTATE, POTASSIUM CHLORIDE, CALCIUM CHLORIDE AND DEXTROSE MONOHYDRATE 100 ML/HR: 5; 600; 310; 30; 20 INJECTION, SOLUTION INTRAVENOUS at 10:50

## 2022-01-01 RX ADMIN — PANTOPRAZOLE SODIUM 40 MG: 40 TABLET, DELAYED RELEASE ORAL at 09:43

## 2022-01-01 RX ADMIN — DEXAMETHASONE SODIUM PHOSPHATE 4 MG: 4 INJECTION, SOLUTION INTRAMUSCULAR; INTRAVENOUS at 17:21

## 2022-01-01 RX ADMIN — SODIUM CHLORIDE, SODIUM LACTATE, POTASSIUM CHLORIDE, CALCIUM CHLORIDE AND DEXTROSE MONOHYDRATE 100 ML/HR: 5; 600; 310; 30; 20 INJECTION, SOLUTION INTRAVENOUS at 00:26

## 2022-01-01 RX ADMIN — PIPERACILLIN AND TAZOBACTAM 3.38 G: 3; .375 INJECTION, POWDER, FOR SOLUTION INTRAVENOUS at 07:39

## 2022-01-01 RX ADMIN — PANTOPRAZOLE SODIUM 40 MG: 40 TABLET, DELAYED RELEASE ORAL at 06:48

## 2022-01-01 RX ADMIN — ONDANSETRON 4 MG: 2 INJECTION INTRAMUSCULAR; INTRAVENOUS at 01:07

## 2022-01-01 RX ADMIN — LEVOTHYROXINE SODIUM 75 MCG: 0.07 TABLET ORAL at 06:30

## 2022-01-01 RX ADMIN — IOPAMIDOL 100 ML: 755 INJECTION, SOLUTION INTRAVENOUS at 00:46

## 2022-01-01 RX ADMIN — LEVOTHYROXINE SODIUM 75 MCG: 0.07 TABLET ORAL at 07:00

## 2022-01-01 RX ADMIN — ENOXAPARIN SODIUM 40 MG: 100 INJECTION SUBCUTANEOUS at 17:43

## 2022-01-01 RX ADMIN — ONDANSETRON 4 MG: 2 INJECTION INTRAMUSCULAR; INTRAVENOUS at 09:07

## 2022-01-01 RX ADMIN — DEXAMETHASONE SODIUM PHOSPHATE 4 MG: 4 INJECTION, SOLUTION INTRAMUSCULAR; INTRAVENOUS at 00:13

## 2022-01-01 RX ADMIN — SODIUM CHLORIDE, PRESERVATIVE FREE 10 ML: 5 INJECTION INTRAVENOUS at 13:06

## 2022-01-01 RX ADMIN — ONDANSETRON 4 MG: 2 INJECTION INTRAMUSCULAR; INTRAVENOUS at 06:59

## 2022-01-01 RX ADMIN — ENOXAPARIN SODIUM 40 MG: 100 INJECTION SUBCUTANEOUS at 17:19

## 2022-01-01 RX ADMIN — SODIUM CHLORIDE, PRESERVATIVE FREE 10 ML: 5 INJECTION INTRAVENOUS at 22:55

## 2022-01-01 RX ADMIN — PANTOPRAZOLE SODIUM 40 MG: 40 TABLET, DELAYED RELEASE ORAL at 06:31

## 2022-01-01 RX ADMIN — SODIUM CHLORIDE 75 ML/HR: 9 INJECTION, SOLUTION INTRAVENOUS at 04:16

## 2022-01-01 RX ADMIN — Medication 5 ML: at 19:04

## 2022-01-01 RX ADMIN — DEXAMETHASONE SODIUM PHOSPHATE 4 MG: 4 INJECTION, SOLUTION INTRAMUSCULAR; INTRAVENOUS at 05:30

## 2022-01-01 RX ADMIN — ENOXAPARIN SODIUM 40 MG: 100 INJECTION SUBCUTANEOUS at 17:38

## 2022-01-01 RX ADMIN — SODIUM CHLORIDE, PRESERVATIVE FREE 10 ML: 5 INJECTION INTRAVENOUS at 07:00

## 2022-01-01 RX ADMIN — METOCLOPRAMIDE 10 MG: 10 TABLET ORAL at 21:28

## 2022-01-01 RX ADMIN — SODIUM CHLORIDE, PRESERVATIVE FREE 10 ML: 5 INJECTION INTRAVENOUS at 17:38

## 2022-01-01 RX ADMIN — PALONOSETRON 0.25 MG: 0.05 INJECTION, SOLUTION INTRAVENOUS at 09:07

## 2022-01-01 RX ADMIN — SODIUM CHLORIDE, SODIUM LACTATE, POTASSIUM CHLORIDE, CALCIUM CHLORIDE AND DEXTROSE MONOHYDRATE 100 ML/HR: 5; 600; 310; 30; 20 INJECTION, SOLUTION INTRAVENOUS at 17:01

## 2022-01-01 RX ADMIN — LEVOTHYROXINE SODIUM 75 MCG: 0.07 TABLET ORAL at 07:39

## 2022-01-01 RX ADMIN — PANTOPRAZOLE SODIUM 40 MG: 40 TABLET, DELAYED RELEASE ORAL at 07:46

## 2022-01-01 RX ADMIN — SODIUM CHLORIDE, PRESERVATIVE FREE 10 ML: 5 INJECTION INTRAVENOUS at 05:55

## 2022-01-01 RX ADMIN — METOCLOPRAMIDE 10 MG: 10 TABLET ORAL at 21:45

## 2022-01-01 RX ADMIN — ENOXAPARIN SODIUM 40 MG: 100 INJECTION SUBCUTANEOUS at 17:20

## 2022-01-01 RX ADMIN — DEXAMETHASONE SODIUM PHOSPHATE 20 MG: 4 INJECTION, SOLUTION INTRAMUSCULAR; INTRAVENOUS at 20:16

## 2022-01-01 RX ADMIN — LEVOTHYROXINE SODIUM 75 MCG: 0.07 TABLET ORAL at 06:35

## 2022-01-01 RX ADMIN — PIPERACILLIN AND TAZOBACTAM 4.5 G: 4; .5 INJECTION, POWDER, FOR SOLUTION INTRAVENOUS at 10:04

## 2022-01-01 RX ADMIN — SODIUM CHLORIDE, PRESERVATIVE FREE 10 ML: 5 INJECTION INTRAVENOUS at 17:43

## 2022-01-01 RX ADMIN — SODIUM CHLORIDE 1000 ML: 9 INJECTION, SOLUTION INTRAVENOUS at 23:45

## 2022-01-01 RX ADMIN — METOCLOPRAMIDE 10 MG: 10 TABLET ORAL at 17:19

## 2022-01-01 RX ADMIN — ONDANSETRON 4 MG: 2 INJECTION INTRAMUSCULAR; INTRAVENOUS at 13:06

## 2022-01-01 RX ADMIN — ENOXAPARIN SODIUM 40 MG: 100 INJECTION SUBCUTANEOUS at 16:44

## 2022-01-01 RX ADMIN — MEGESTROL ACETATE 400 MG: 40 SUSPENSION ORAL at 08:14

## 2022-01-01 RX ADMIN — SODIUM CHLORIDE, PRESERVATIVE FREE 10 ML: 5 INJECTION INTRAVENOUS at 04:16

## 2022-01-01 RX ADMIN — MEGESTROL ACETATE 400 MG: 40 SUSPENSION ORAL at 08:38

## 2022-01-01 RX ADMIN — SODIUM CHLORIDE, PRESERVATIVE FREE 10 ML: 5 INJECTION INTRAVENOUS at 21:56

## 2022-01-01 RX ADMIN — DEXAMETHASONE SODIUM PHOSPHATE 4 MG: 4 INJECTION, SOLUTION INTRAMUSCULAR; INTRAVENOUS at 23:51

## 2022-01-01 RX ADMIN — PANTOPRAZOLE SODIUM 40 MG: 40 TABLET, DELAYED RELEASE ORAL at 06:30

## 2022-01-01 RX ADMIN — SODIUM CHLORIDE, SODIUM LACTATE, POTASSIUM CHLORIDE, CALCIUM CHLORIDE AND DEXTROSE MONOHYDRATE 100 ML/HR: 5; 600; 310; 30; 20 INJECTION, SOLUTION INTRAVENOUS at 06:48

## 2022-01-01 RX ADMIN — MEGESTROL ACETATE 400 MG: 40 SUSPENSION ORAL at 09:43

## 2022-01-01 RX ADMIN — SODIUM CHLORIDE 125 ML/HR: 9 INJECTION, SOLUTION INTRAVENOUS at 18:40

## 2022-01-01 RX ADMIN — SODIUM CHLORIDE, SODIUM LACTATE, POTASSIUM CHLORIDE, CALCIUM CHLORIDE AND DEXTROSE MONOHYDRATE 100 ML/HR: 5; 600; 310; 30; 20 INJECTION, SOLUTION INTRAVENOUS at 23:55

## 2022-01-01 RX ADMIN — LEVOTHYROXINE SODIUM 75 MCG: 0.07 TABLET ORAL at 06:52

## 2022-01-01 RX ADMIN — MEGESTROL ACETATE 400 MG: 40 SUSPENSION ORAL at 09:14

## 2022-01-01 RX ADMIN — DEXAMETHASONE SODIUM PHOSPHATE 4 MG: 4 INJECTION, SOLUTION INTRAMUSCULAR; INTRAVENOUS at 06:33

## 2022-01-01 RX ADMIN — PIPERACILLIN AND TAZOBACTAM 3.38 G: 3; .375 INJECTION, POWDER, FOR SOLUTION INTRAVENOUS at 16:38

## 2022-01-01 RX ADMIN — DEXAMETHASONE 4 MG: 4 TABLET ORAL at 22:01

## 2022-01-01 RX ADMIN — DEXAMETHASONE SODIUM PHOSPHATE 4 MG: 4 INJECTION, SOLUTION INTRAMUSCULAR; INTRAVENOUS at 17:38

## 2022-01-01 RX ADMIN — SODIUM CHLORIDE, PRESERVATIVE FREE 10 ML: 5 INJECTION INTRAVENOUS at 22:32

## 2022-01-01 RX ADMIN — SODIUM CHLORIDE 150 ML/HR: 9 INJECTION, SOLUTION INTRAVENOUS at 07:21

## 2022-01-01 RX ADMIN — DEXAMETHASONE SODIUM PHOSPHATE 4 MG: 4 INJECTION, SOLUTION INTRAMUSCULAR; INTRAVENOUS at 17:13

## 2022-01-01 RX ADMIN — TRAZODONE HYDROCHLORIDE 50 MG: 50 TABLET ORAL at 20:46

## 2022-01-01 RX ADMIN — SODIUM CHLORIDE, PRESERVATIVE FREE 10 ML: 5 INJECTION INTRAVENOUS at 23:46

## 2022-01-01 RX ADMIN — DEXAMETHASONE 4 MG: 4 TABLET ORAL at 21:17

## 2022-01-01 RX ADMIN — MEGESTROL ACETATE 400 MG: 40 SUSPENSION ORAL at 08:26

## 2022-01-01 RX ADMIN — DEXAMETHASONE SODIUM PHOSPHATE 4 MG: 4 INJECTION, SOLUTION INTRAMUSCULAR; INTRAVENOUS at 13:36

## 2022-06-28 NOTE — PROGRESS NOTES
Chief Complaint   Patient presents with    Thyroid Problem       * New Patient Visit - last seen by me at my old practice 6/21     General:  In past year. .. Back on oral chemo (to use until stops working), IV was not working  Has hand foot syndrome   Still in liver  Scan is stable but tumor markers are going up  So likely to switch meds soon    Followed for  1. Hypothyroidism: On 75mcg brand unithroid, reduced from 88mcg last year for slightly suppressed TSH (had lost a lot of weight from chemo)  Takes correctly  Has high deductible plan - has hit the $6000  Has gained some weight back  Is cold all the time  No steroids   Feels better on this med vs. the last     2. Thyroid nodules:  Has MNG s/p benign FNA of left nodule x 4, 1 benign FNA right nodule 7/11  US 3/17, 3/19, 6/21 w/o changes - plan to repeat 2 years   (of note on left is more lobular gland or conglomerate of smaller nodules)    Prior Breast Cancer history:   Hx breast cancer '04 s/p bilat mastectomy and Hormones Tx x 5.5 yr  Then 7/18 recurrence with mets to bone  On hormones and oral chemo--> switched to IV chemo (b/c progression to liver); some steroids too    Thyroid Symptoms  **has decreased energy**, **has weight loss**, denies change in appetite, **sleeps excessively**, **has hair loss**, **has dry skin**, denies sweats, denies cold intolerance, denies tremor, denies palpitations, denies change in bowels, no change in menses, denies mood changes    Neck Symptoms  denies dysphagia, denies anterior neck pain, denies neck swelling, notes no nodules    Labs/Studies    6/10/21 TSH 0.356  6/10/21 Thyroid US: right nodule 1.34 x .38 x 1.65 cm, left nodule 1.16 xx 0.83 x 1.16 cm - no changes vs 3/19 (plan repeat 2 years)    Lab Results   Component Value Date/Time    TSH 2.05 10/25/2019 05:11 AM         Past Medical History:   Diagnosis Date    Breast cancer ([facility] [address]     breast, mets to bone    Colitis     Hypertension     Hypothyroidism     Multiple thyroid nodules         Blood pressure (!) 156/76, pulse 65, height 5' 3\" (1.6 m), weight 132 lb (59.9 kg). Normally not an issue -stress getting here (usually high with machine)  Weight Metrics 6/28/2022 4/12/2021 10/28/2019 5/22/2019 8/6/2018 8/6/2018   Weight 132 lb 117 lb 146 lb 9.7 oz 150 lb 140 lb 145 lb   BMI 23.38 kg/m2 20.73 kg/m2 25.97 kg/m2 26.57 kg/m2 24.8 kg/m2 25.69 kg/m2        EXAM:  - GENERAL: NCAT, Appears well nourished   - EYES: EOMI, non-icteric, no proptosis   - Ear/Nose/Throat: NCAT, no visible inflammation or masses   - CARDIOVASCULAR: no cyanosis, no visible JVD   - RESPIRATORY: respiratory effort normal without any distress or labored breathing   - MUSCULOSKELETAL: Normal ROM of neck and upper extremities observed   - SKIN: No rash on face  - NEUROLOGIC:  No facial asymmetry (Cranial nerve 7 motor function), No gaze palsy   - PSYCHIATRIC: Normal affect, Normal insight and judgement    Assessment/Plan:   1. Acquired hypothyroidism  Last year lowered dose b/c wt loss from chemo  Has gained some weight since then(117-->132) so possible may need dose adjustment  Check levels and adjust prn  Is on brand, takes correctly  No longer on anti-estrogen meds    2. Multiple thyroid nodules  Left nodule s/p benign FNA x 4, Right nodule s/p benign FNA x 1 ('7/11)  Repeat US since w/o change, last 6/21, plan to repeat 2 yr - so due ~6/23    Orders Placed This Encounter    TSH 3RD GENERATION        Follow-up and Dispositions    · Return in about 1 year (around 6/28/2023).

## 2022-08-05 NOTE — ROUTINE PROCESS
Bedside and Verbal shift change report given to Nolan Garcia RN (oncoming nurse) by Jessica Marinelli RN (offgoing nurse).  Report included the following information SBAR, Kardex, ED Summary, Intake/Output, MAR, Recent Results, Med Rec Status and Cardiac Rhythm SR. No

## 2022-09-09 NOTE — Clinical Note
Patient left without being seen after triage. Patient was called multiple times on phone and Waiting Room, no answer.

## 2022-09-10 NOTE — ED NOTES
Attempted to  call patient cell on file again, No answer. Patient has eloped from ER. Charge RN aware.

## 2022-09-10 NOTE — DISCHARGE INSTRUCTIONS
Return to the emergency department with any new or worsening symptoms. I recommend you follow-up with your oncologist and you can show them the report of your CT scan to compare it to your previous scans.

## 2022-09-10 NOTE — ED NOTES
Pain assessment on discharge was   Condition Stable  Patient discharged to home  Patient education was completed  Education taught to patient and   Teaching method used was handout and verbal  Understanding of teaching was good  Patient was discharged ambulatory with standby assistance  Discharged with   Valuables were given to: patient/ remained in possession of belongings during stay.

## 2022-09-10 NOTE — ED NOTES
Power Port accessed - Right anterior chest - with 20 G 1 inch rubalcava needle using sterile technique. 10cc waste syringe was pulled with ease. Labs collected in sterile fashion. Port was flushed and capped with alcohol hub cap. Patient tolerated procedure well.

## 2022-09-10 NOTE — ED TRIAGE NOTES
Oncology pt, breast ca. Last chemo three weeks ago. C/o fatigue, lethargy. Seen Tuesday given fluids and steroids, continues to feel poorly. Denies fevers. Decreased po intake.

## 2022-09-10 NOTE — ED NOTES
Patient called x 2 in [facility]. This RN looked outside to see if patient on bench, no patient found. Patient is not able to be contacted.

## 2022-09-10 NOTE — ED PROVIDER NOTES
HPI   61-year-old female with a past medical history of metastatic breast cancer to the liver and bones who presents to the emergency department due to nausea and vomiting as well as generalized weakness. Patient developed symptoms of nausea and vomiting last weekend. Due to the symptoms she contacted her oncologist and went to the infusion center on Tuesday where she got IV fluids, steroids, and IV antiemetics. She felt better that day and then the next day but her symptoms returned. She has not been eating or drinking much and has been laying in bed for most of the day. She says she feels very weak. Patient denies any abdominal pain or diarrhea. There is been no blood in her stool. No headaches. She had a recent MRI of her brain which did not show any metastatic lesions. Her  also states that she recently had a CT scan of her liver that showed worsening metastatic disease. She has since been taken off of her oral chemotherapy as it was not working and there were discussions with the patient's oncologist about starting a new chemotherapy medication. No fevers or chills. No blood in her vomit. No chest pain or shortness of breath. Past Medical History:   Diagnosis Date    Breast cancer ([address]     breast, mets to bone    Colitis     Hypertension     Hypothyroidism     Multiple thyroid nodules        Past Surgical History:   Procedure Laterality Date    HX HYSTERECTOMY      w/BSO    HX MASTECTOMY Bilateral     HX ORTHOPAEDIC      wrist    HX TONSILLECTOMY           No family history on file.     Social History     Socioeconomic History    Marital status:      Spouse name: Not on file    Number of children: Not on file    Years of education: Not on file    Highest education level: Not on file   Occupational History    Not on file   Tobacco Use    Smoking status: Never    Smokeless tobacco: Never   Substance and Sexual Activity    Alcohol use: Yes     Comment: ocasionally    Drug use: Not on file    Sexual activity: Not on file   Other Topics Concern    Not on file   Social History Narrative    Not on file     Social Determinants of Health     Financial Resource Strain: Not on file   Food Insecurity: Not on file   Transportation Needs: Not on file   Physical Activity: Not on file   Stress: Not on file   Social Connections: Not on file   Intimate Partner Violence: Not on file   Housing Stability: Not on file         ALLERGIES: Codeine and Erythromycin    Review of Systems  A complete review of systems was performed and all systems reviewed are negative unless otherwise documented in the HPI    Vitals:    09/09/22 2113   BP: (!) 109/49   Pulse: (!) 50   Resp: 16   Temp: 98.4 °F (36.9 °C)   SpO2: 96%            Physical Exam  Constitutional:       Comments: Chronically ill-appearing but nontoxic and not actively vomiting   HENT:      Mouth/Throat:      Comments: Mucous membranes are moist  Eyes:      Extraocular Movements: Extraocular movements intact. Comments: Scleral icterus present   Neck:      Comments: Trachea midline  Cardiovascular:      Comments: Regular rate and rhythm. Normal capillary refill  Pulmonary:      Effort: Pulmonary effort is normal. No respiratory distress. Breath sounds: Normal breath sounds. No wheezing or rales. Abdominal:      Comments: Soft, nontender, nondistended   Musculoskeletal:         General: Normal range of motion. Cervical back: Normal range of motion. Right lower leg: No edema. Left lower leg: No edema. Skin:     General: Skin is warm and dry. Coloration: Skin is jaundiced. Neurological:      Comments: Awake and alert. Speech is normal.  GCS 15        MDM  72-year-old female who presents with the above chief complaint. She has stable vital signs. She appears chronically ill but not overtly dehydrated. She appears jaundiced and she has scleral icterus. Her  thinks this is new over the last few weeks.   He thinks that this discoloration her skin is new since when she had a CT scan of her liver done. Though she has no abdominal tenderness on exam I think it is worth doing another CT to make sure she does not have a large obstruction in her biliary tree. Labs and symptomatic treatment ordered. Labs notable for some anemia and thrombocytopenia which the patient tells me she believes is chronic. She also has hyperbilirubinemia and mild transaminitis. Her CT scan shows worsening metastatic disease compared to 2019. There is no focal obstruction in the biliary tree and I suspect her liver function abnormalities are secondary to the significant tumor burden in her liver. On reassessment the patient is feeling better. She is requesting to go home. I do not see that there would be any intervention at this time regarding her findings on CT but she was notified of it and I printed her report of this so she can take to her oncologist.  I encouraged her to return with any new or worsening symptoms. She and her  are agreeable with the plan for going forward and she was discharged in stable condition.        Procedures

## 2022-09-10 NOTE — ED NOTES
Registration was able to get in contact with patient. Patient ambulatory to room from vehicle in parking lot.

## 2022-09-15 PROBLEM — E86.0 DEHYDRATION: Status: ACTIVE | Noted: 2022-01-01

## 2022-09-15 NOTE — H&P
HEMATOLOGY / ONCOLOGY    Anders Peabody 1960 Age: 63 y.o. Date of service: 20   Location: Catrina Rangel MD    CHIEF COMPLAINT:  unable to eat; weak    Disease Features  NJ+; Herceptest 2+, but FISH negative; grade 2; tumor 3.2 cm; SN neg  No lymphovascular invasion; 10% in situ component  Intermediate Oncotype score of 22; she elected hormonal therapy. S/p prophylactic mastectomy on the R  Biopsy proven recurrence in the bones 2018  CA27-29+  PIK3CA+ and ESRI+ by [id number]    Treatment History  Bilateral mastectomy 06  Tamoxifen 06-3/2007, adjuvant  ELEONORA/BSO 3/2007  Aromasin 3/2007-2012, adjuvant  Letrozole 18-19; stable disease. Bella Alfredo (ribociclib) 18- (dose reduced to 400g after 1st cycle); 8/10/19, stable, then progressed. Piqray 19 Rx held (19-19) - 21, progressive disease. Faslodex 19 - 21, stable disease then progression. Doxil x 4 cycles, 21 - 21, skin toxicity and no response  capecitabine 21 - 22, progressive disease  Faslodex/anastrozole 22 - 9/15/22, progresssion      Current Treatment  Xgeva 18-  Carboplatin/paclitaxel 22 -     Treatment Goal: Palliation  Disease Status: progressive disease    ACTIVE PROBLEMS  Breast cancer, stage IIIA (T2 N0 M0)   --Metastatic breast cancer, 2018  --Bone metastases, 2018  --extensive liver metastases, 3/8/21  Small pulmonary nodules  L-spine pain  Fracture L sacrum  Rash from [address]  Bloody colitis due to [address] 10/2/19  Shoulder pain  Nausea and vomting  Elevated bilirubin due to extensive liver metastases    Other Medical History   - all sons  Hypothyroidism (Hashimoto's Thyroiditis)  Tonsillectomy       INTERIM HISTORY AND ASSESSMENT  Ms. Gurrola is miserable. She vomits 3 times per week. She cannot eat. Her abdomen is no bloated from ascites. She has been functioning normally up until about 4 weeks ago.  She has been through all hormonal therapies + capecitabine. Now she needs chemotherapy, but her liver is filling up with tumor. Bili is 8. No evidence of biliary ductal obstruction on recent CT at I or BS. We have to treat her despite high bili. I propose carboplatin/paclitaxel. AUC 6 for the [place] and 50% dose reduction, or 40 mg/sqm of pactlitaxel weekly for 21 day cycles. Start tomorrow. Therapeutic paracentesis tomorrow; check fluid cytology and one more ultrasound to r/o biliary ductal obstruction. I think she has intrahepatic obstruction from so much tumor. Continue ondansetron and metoclopramide for N/V, though it hasn't been helping much. IV fluids. Xray for painful L shoulder. Usual premeds for chemotherapy. No results for input(s): WBC, GRANS, ANEU, HGB, PLT, INR, APTT, NA, K, GLU, CREA, ALT, TBIL, TBILI, AP, CA, MG, PHOS, HGBEXT, PLTEXT, INREXT in the last 72 hours. No lab exists for component: ABG, GPT, SGOT     Past Medical History:   Diagnosis Date    Breast cancer ([facility] [address]     breast, mets to bone    Colitis     Hypertension     Hypothyroidism     Multiple thyroid nodules      Past Surgical History:   Procedure Laterality Date    HX HYSTERECTOMY      w/BSO    HX MASTECTOMY Bilateral     HX ORTHOPAEDIC      wrist    HX TONSILLECTOMY       Medications and Allergies have been reviewed and updated in the [facility] system. REVIEW OF SYSTEMS  Except as noted in the history and assessement above, all other systems are negative. EXAMINATION   Vital signs were reviewed abnormalities discussed above.    General: Moderate distress; using wheelchair and lying on exam table  HEENT: conjunctiva clear; sclera icteric    Cervical nodes: none palpable  Supraclavicular nodes: none palpable  Axillary nodes: none palpable  Inguinal nodes: none palpable  Lungs: clear  CV: RRR  Abd: bloated; ascites  Skin: no significant rashes  Ext: Edema: none; Tenderness: none  Neuro/Psych:  Gait: WC  Speech: NL   Affect: NL  Cognition: NL Port: no inflammation  Bilateral mastectomies     Social History     Tobacco Use    Smoking status: Never    Smokeless tobacco: Never   Substance Use Topics    Alcohol use: Yes     Comment: ocasionally      No family history on file.      Wilson Schilling MD

## 2022-09-16 NOTE — PROGRESS NOTES
Have attempted to call [facility] 4 times since 1400. Called main office line [phone] along w private nurse line  [phone].   No answer at either line

## 2022-09-16 NOTE — PROGRESS NOTES
Called [facility] and spoke with Dr. Zachery Alvarez' nurse who clarified aspira order with MD. Per office nurse Dr. Zachery Alvarez stated aspira and paracentesis on hold for now due to hypotension. Order cancelled and notified office to place order when patient stable.

## 2022-09-16 NOTE — PROGRESS NOTES
Hematology-Oncology Progress Note    Sanford Oakes  1960  [id number]  9/16/2022    Follow-up for: metastatic breast cancer     []        Chart notes since last visit reviewed   []        Medications reviewed for allergies and interactions       Case discussed with the following:         []                            [x]        Nursing Staff                                                                         []        Pathologist                                                                        []        [address] is concerned with hypotension this am    Subjective:     Spoke with patient who complains of: abd. Bloating,.  Fatigue, some dizziness    Objective:   Patient Vitals for the past 24 hrs:   BP Temp Pulse Resp SpO2   09/16/22 0904 (!) 102/53 98.7 °F (37.1 °C) 78 17 96 %   09/16/22 0430 (!) 81/53 98.2 °F (36.8 °C) 62 16 96 %   09/16/22 0409 (!) 79/42 98.1 °F (36.7 °C) (!) 56 16 97 %   09/15/22 1850 (!) 90/58 98.4 °F (36.9 °C) 80 16 97 %   09/15/22 1726 (!) 107/54 98.4 °F (36.9 °C) 64 15 97 %       REVIEW OF SYSTEMS:    Constitutional: negative fever, negative chills, negative weight loss  Eyes:   negative visual changes  ENT:   negative sore throat, tongue or lip swelling  Respiratory:  negative cough, negative dyspnea  Cards:  negative for chest pain, palpitations, lower extremity edema  GI:   negative for nausea, vomiting, diarrhea, and abdominal pain  Neuro:  negative for headaches, dizziness, vertigo  []                        Full ROS o/w normal/non contributor    Constitutional:  Patient looks  [x]        Sick  [x]        Frail  []        Better                                                 []        Depressed    HEENT:  [x]   NC                         []   AT               []    ALOPECIA           Eyes: []   Normal               [x]    Icteric  Oropharynx: []    Normal                  []  Thrush               []   Dry  Mucositis: [x]    None                 Grade: [] I  []        II  []        III  []        IV  Neck:   [x]   Supple                  []  Rigid               JVD:    [x]   ABSENT       []   PRESENT  Lymphadenopathy:   []   None Noted            []   PRESENT    Chest:  [x]   Clear               []    Rhonchi                      Dec'd @     []  Right Base           []   Left Base    CV:             [x]   Regular              []  Irregular               []   Tachy                []   Murmur  Abdominal:   []    Soft              [x]   NON-tender               []   Tender      BS:    []   ABSENT                   [x]   PRESENT  Liver:     []  NON-palp                  [x]   EDGE- palp  Spleen: [x]   NON-palp                   [x]  EDGE - palp   ascites.   Mass:   [x]   ABSENT                          []  PRESENT  Extr:    [x]  Lymphedema             []   Cyanosis      []  Clubbing  Edema:     []   NONE       [x]   PRESENT  Skin:  Intact [x]           Purpura []        Rash: []   ABSENT       []  PRESENT  Neuro:  [x]        Normal  []        Confused      Available labs reviewed:  Labs:    Recent Results (from the past 24 hour(s))   PTT    Collection Time: 09/16/22  1:13 AM   Result Value Ref Range    aPTT 69.0 (H) 22.1 - 31.0 sec    aPTT, therapeutic range     58.0 - 77.0 SECS   PROTHROMBIN TIME + INR    Collection Time: 09/16/22  1:13 AM   Result Value Ref Range    INR 1.5 (H) 0.9 - 1.1      Prothrombin time 15.1 (H) 9.0 - 11.1 sec   CBC W/O DIFF    Collection Time: 09/16/22  1:13 AM   Result Value Ref Range    WBC 5.6 3.6 - 11.0 K/uL    RBC 2.53 (L) 3.80 - 5.20 M/uL    HGB 8.5 (L) 11.5 - 16.0 g/dL    HCT 25.4 (L) 35.0 - 47.0 %    .4 (H) 80.0 - 99.0 FL    MCH 33.6 26.0 - 34.0 PG    MCHC 33.5 30.0 - 36.5 g/dL    RDW 19.9 (H) 11.5 - 14.5 %    PLATELET 903 (L) 258 - 400 K/uL    MPV 10.6 8.9 - 12.9 FL    NRBC 1.2 (H) 0  WBC    ABSOLUTE NRBC 0.07 (H) 0.00 - 0.76 K/uL   METABOLIC PANEL, COMPREHENSIVE    Collection Time: 09/16/22  1:13 AM   Result Value Ref Range    Sodium 136 136 - 145 mmol/L    Potassium 3.4 (L) 3.5 - 5.1 mmol/L    Chloride 102 97 - 108 mmol/L    CO2 23 21 - 32 mmol/L    Anion gap 11 5 - 15 mmol/L    Glucose 69 65 - 100 mg/dL    BUN 8 6 - 20 MG/DL    Creatinine 0.53 (L) 0.55 - 1.02 MG/DL    BUN/Creatinine ratio 15 12 - 20      GFR est AA >60 >60 ml/min/1.73m2    GFR est non-AA >60 >60 ml/min/1.73m2    Calcium 7.2 (L) 8.5 - 10.1 MG/DL    Bilirubin, total 6.6 (H) 0.2 - 1.0 MG/DL    ALT (SGPT) 50 12 - 78 U/L    AST (SGOT) 146 (H) 15 - 37 U/L    Alk. phosphatase 239 (H) 45 - 117 U/L    Protein, total 4.4 (L) 6.4 - 8.2 g/dL    Albumin 2.0 (L) 3.5 - 5.0 g/dL    Globulin 2.4 2.0 - 4.0 g/dL    A-G Ratio 0.8 (L) 1.1 - 2.2         Available Xrays reviewed:    Chemotherapy monitored and toxicities assessed:    Assessment and Plan   Metastatic breast cancer with progressive liver mets. .  she has been through all available hormornal therapies as well as xeloda, and picqray. Dr. Charleen López admitted her for palliative chemotherapy. Her bilirubin was 8 in the office this week and 6 here. A dose reduction in the carbo/taxol has been made. We would like to treat her today. ..  but her low blood pressure needs to be addressed. Hypotension. . she hasn't been eating or drinking. .  hopefully this is due to dehydration. . she is at risk for sepsis however so we will increase ivf,  check cultures and begin emperic zosyn    Anemia. Paige Remedies secondary to chronic disease +/- occult bleeding, will check cbc daily and transfuse prn    Ascites. . will do paracentesis for comfort once her bp is stable.   Ruby Pearce MD

## 2022-09-16 NOTE — ROUTINE PROCESS
Notified [facility] on call regarding patient BP and HR. On call MD Dr. Jaguar Watson returned call No new orders or interventions for BP.  RN to continue to monitor and notify if systolic under 70.       82/64/28 0409   Vital Signs   Temp 98.1 °F (36.7 °C)   Temp Source Oral   Pulse (Heart Rate) (!) 56   Heart Rate Source Monitor   Resp Rate 16   O2 Sat (%) 97 %   Level of Consciousness 0   BP (!) 79/42   MAP (Calculated) (!) 54   BP 1 Method Automatic   BP 1 Location Left arm   BP Patient Position At rest   MEWS Score 3       Ross Butterfield RN

## 2022-09-16 NOTE — PROGRESS NOTES
Problem: Falls - Risk of  Goal: *Absence of Falls  Description: Document Juan Nuñezs Fall Risk and appropriate interventions in the flowsheet.   Outcome: Progressing Towards Goal  Note: Fall Risk Interventions:  Mobility Interventions: Patient to call before getting OOB         Medication Interventions: Assess postural VS orthostatic hypotension, Evaluate medications/consider consulting pharmacy, Patient to call before getting OOB, Teach patient to arise slowly

## 2022-09-16 NOTE — PROGRESS NOTES
Pharmacy Note     Zosyn 2250 mg IV q8h ordered for treatment of Sepsis. Per [address] / Extended Infusion B Lactam Policy, Zosyn will be changed to 4500 mg IV x 1 over 30 minutes, followed by 3375 mg IV q8h infused over 4 hours. Estimated Creatinine Clearance: CrCl cannot be calculated (Unknown ideal weight.). Dialysis Status, NANI, CKD:      BMI:  There is no height or weight on file to calculate BMI. Recent Labs     22  0113   WBC 5.6     Temp (24hrs), Av.4 °F (36.9 °C), Min:98.1 °F (36.7 °C), Max:98.7 °F (37.1 °C)      Rationale for Adjustment:  Extended infusion policy and CrCl > 069 mL/min. Pharmacy will continue to monitor and adjust dose as necessary. Please call with any questions.     Thank you,  Umang Carrizales

## 2022-09-17 NOTE — PROGRESS NOTES
Transition of Care    Reason for Admission:  Unable to Ear and walk                  RUR Score:  16%                PCP: First and Last name:   None     Name of Practice:    Are you a current patient: Yes/No:    Approximate date of last visit:    Can you participate in a virtual visit if needed:     Do you (patient/family) have any concerns for transition/discharge? Ms. Gurrola does not have a primary care physician. She is willing to get one if she needs home health services if the oncologist is not willing to sign home health orders, if needed               Plan for utilizing home health:   Ms. Benedicto Stubbs is willing to have home health, if needed. Current Advanced Directive/Advance Care Plan:  Full Code  Ms. Gurrola stated that she has advanced directive. She was asked to bring a copy to the hospital.     Healthcare Decision Maker:   Click here to complete [address] including selection of the Healthcare Decision Maker Relationship (ie \"Primary\")    Mrs. Gurrola's healthcare decision maker is her , Kayode Tavarez, [phone]. Transition of Care Plan:   Ms. Benedicto Stubbs was seen in her room. Her address and phone number were verified. She lives with her  in a 2 story single family residence with 4 steps to enter. She was independent with her ADLs and IADLs prior to her admission. She uses the [facility] on [address] in [place] for her pharmacy. She is about to acquire and afford her medications. Her  will be providing transportation at discharge.       Care Management Interventions  PCP Verified by CM: No  Palliative Care Criteria Met (RRAT>21 & CHF Dx)?: No  Mode of Transport at Discharge: Self  Transition of Care Consult (CM Consult): Discharge Planning  MyChart Signup: No  Discharge Durable Medical Equipment: No  Physical Therapy Consult: No  Occupational Therapy Consult: No  Speech Therapy Consult: No  Support Systems: Spouse/Significant Other  Confirm Follow Up Transport: Self  The Patient and/or Patient Representative was Provided with a Choice of Provider and Agrees with the Discharge Plan?: No  Freedom of Choice List was Provided with Basic Dialogue that Supports the Patient's Individualized Plan of Care/Goals, Treatment Preferences and Shares the Quality Data Associated with the Providers?: No  [facility] Resource Information Provided?: No  Discharge Location  Patient Expects to be Discharged to[de-identified] Home    Will continue to follow for discharge planning.   Signed By: Shirley Vigil LCSW     September 17, 2022

## 2022-09-17 NOTE — PROGRESS NOTES
Hematology-Oncology Progress Note    Sanford Oakes  1960  [id number]  9/17/2022    Follow-up for: metastatic breast cancer     []        Chart notes since last visit reviewed   []        Medications reviewed for allergies and interactions       Case discussed with the following:         []                            [x]        Nursing Staff                                                                         []        Pathologist                                                                        []        FAMILY      Subjective:     Spoke with patient who complains of: abd. Bloating,. Fatigue, some dizziness  Abd bloating is much worse than yesterday.    Cultures <24 hour growth    Objective:   Patient Vitals for the past 24 hrs:   BP Temp Pulse Resp SpO2 Weight   09/17/22 1054 (!) 110/52 -- -- -- -- --   09/17/22 0815 (!) 96/56 97.9 °F (36.6 °C) 63 19 97 % --   09/17/22 0355 91/61 97.6 °F (36.4 °C) 60 16 97 % --   09/16/22 2011 (!) 98/52 98.4 °F (36.9 °C) (!) 45 15 97 % --   09/16/22 1458 (!) 93/56 98.1 °F (36.7 °C) 76 16 100 % --   09/16/22 1338 -- -- -- -- -- 56.2 kg (123 lb 14.4 oz)         REVIEW OF SYSTEMS:    Constitutional: negative fever, negative chills, negative weight loss  Eyes:   negative visual changes  ENT:   negative sore throat, tongue or lip swelling  Respiratory:  negative cough, negative dyspnea  Cards:  negative for chest pain, palpitations, lower extremity edema  GI:   negative for nausea, vomiting, diarrhea, and abdominal pain  Neuro:  negative for headaches, dizziness, vertigo  []                        Full ROS o/w normal/non contributor    Constitutional:  Patient looks  [x]        Sick  [x]        Frail  []        Better                                                 []        Depressed    HEENT:  [x]   NC                         []   AT               []    ALOPECIA           Eyes: []   Normal               [x]    Icteric  Oropharynx: []    Normal                  [] Ирина Rickie               []   Dry  Mucositis: [x]    None                 Grade: []        I  []        II  []        III  []        IV  Neck:   [x]   Supple                  []  Rigid               JVD:    [x]   ABSENT       []   PRESENT  Lymphadenopathy:   []   None Noted            []   PRESENT    Chest:  [x]   Clear               []    Rhonchi                      Dec'd @     []  Right Base           []   Left Base    CV:             [x]   Regular              []  Irregular               []   Tachy                []   Murmur  Abdominal:   []    Soft              [x]   NON-tender               []   Tender    distended w ascites  BS:    []   ABSENT                   [x]   PRESENT  Liver:     []  NON-palp                  [x]   EDGE- palp  Spleen: [x]   NON-palp                   [x]  EDGE - palp   ascites.   Mass:   [x]   ABSENT                          []  PRESENT  Extr:    [x]  Lymphedema             []   Cyanosis      []  Clubbing  Edema:     []   NONE       [x]   PRESENT  Skin:  Intact [x]           Purpura []        Rash: []   ABSENT       []  PRESENT  Neuro:  [x]        Normal  []        Confused      Available labs reviewed:  Labs:    Recent Results (from the past 24 hour(s))   CBC W/O DIFF    Collection Time: 09/17/22 12:09 AM   Result Value Ref Range    WBC 5.1 3.6 - 11.0 K/uL    RBC 2.44 (L) 3.80 - 5.20 M/uL    HGB 8.1 (L) 11.5 - 16.0 g/dL    HCT 25.3 (L) 35.0 - 47.0 %    .7 (H) 80.0 - 99.0 FL    MCH 33.2 26.0 - 34.0 PG    MCHC 32.0 30.0 - 36.5 g/dL    RDW 21.0 (H) 11.5 - 14.5 %    PLATELET 534 (L) 921 - 400 K/uL    MPV 10.5 8.9 - 12.9 FL    NRBC 1.8 (H) 0  WBC    ABSOLUTE NRBC 0.09 (H) 0.00 - 2.23 K/uL   METABOLIC PANEL, COMPREHENSIVE    Collection Time: 09/17/22 12:09 AM   Result Value Ref Range    Sodium 138 136 - 145 mmol/L    Potassium 3.2 (L) 3.5 - 5.1 mmol/L    Chloride 106 97 - 108 mmol/L    CO2 22 21 - 32 mmol/L    Anion gap 10 5 - 15 mmol/L    Glucose 77 65 - 100 mg/dL    BUN 8 6 - 20 MG/DL    Creatinine 0.52 (L) 0.55 - 1.02 MG/DL    BUN/Creatinine ratio 15 12 - 20      GFR est AA >60 >60 ml/min/1.73m2    GFR est non-AA >60 >60 ml/min/1.73m2    Calcium 7.0 (L) 8.5 - 10.1 MG/DL    Bilirubin, total 5.8 (H) 0.2 - 1.0 MG/DL    ALT (SGPT) 48 12 - 78 U/L    AST (SGOT) 150 (H) 15 - 37 U/L    Alk. phosphatase 208 (H) 45 - 117 U/L    Protein, total 4.2 (L) 6.4 - 8.2 g/dL    Albumin 1.9 (L) 3.5 - 5.0 g/dL    Globulin 2.3 2.0 - 4.0 g/dL    A-G Ratio 0.8 (L) 1.1 - 2.2         Available Xrays reviewed:    Chemotherapy monitored and toxicities assessed:    Assessment and Plan   Metastatic breast cancer with progressive liver mets. .  she has been through all available hormornal therapies as well as xeloda, and picqray. Dr. Luis Cheek admitted her for palliative chemotherapy. Her bilirubin was 8 in the office this week and 6 here. A dose reduction in the carbo/taxol has been made. Chemo held yesterday due to concern for sepsis. Cultures were drawn and she was started on empiric zosyn. Today 9/17 she will get a para as she is having worsening distension and some dyspnea related to her ascites. Will ask them to limit the draw to 3L. Pharmacy does not have the drug today. Needs to be given tomorrow. That is fine b/c we will wait on chemo until cultures are no growth x 24 hours. Plan for chemo tomorrow. Taxol 40mg/m2 and Carbo AUC of 6. Hypotension. . she hasn't been eating or drinking. .  hopefully this is due to dehydration. . she is at risk for sepsis however so we will increase ivf,  cultures pending and continue emperic zosyn    Anemia. Alison Francis secondary to chronic disease +/- occult bleeding, will check cbc daily and transfuse prn    Ascites. . will do paracentesis for comfort once her bp is stable. 5. Debility- pt consult  6. Allergic reaction- had allergic reaction to doxil.  Will give dex 20mg tonight prior to chemo    Maci Crews MD

## 2022-09-18 NOTE — PROGRESS NOTES
Hematology-Oncology Progress Note    Mitchel Srivastava  1960  [id number]  9/18/2022    Follow-up for: metastatic breast cancer     [x]        Chart notes since last visit reviewed   [x]        Medications reviewed for allergies and interactions       Case discussed with the following:         []                            [x]        Nursing Staff                                                                         []        Pathologist                                                                        []        FAMILY      Subjective:     Cultures still no growth  3L Drained yesterday, no SBP  Plan for chemo today     Objective:   Patient Vitals for the past 24 hrs:   BP Temp Pulse Resp SpO2   09/18/22 0832 (!) 103/59 98.4 °F (36.9 °C) 77 17 98 %   09/18/22 0734 (!) 92/54 -- -- -- --   09/18/22 0646 (!) 88/50 97.6 °F (36.4 °C) 69 15 93 %   09/18/22 0214 97/63 97.4 °F (36.3 °C) 73 18 97 %   09/17/22 2142 -- -- -- -- 96 %   09/17/22 1730 111/64 98 °F (36.7 °C) 63 17 98 %   09/17/22 1515 104/62 97.7 °F (36.5 °C) 67 18 97 %   09/17/22 1054 (!) 110/52 -- -- -- --         REVIEW OF SYSTEMS:    Constitutional: negative fever, negative chills, negative weight loss  Eyes:   negative visual changes  ENT:   negative sore throat, tongue or lip swelling  Respiratory:  negative cough, negative dyspnea  Cards:  negative for chest pain, palpitations, lower extremity edema  GI:   negative for nausea, vomiting, diarrhea, and abdominal pain  Neuro:  negative for headaches, dizziness, vertigo  []                        Full ROS o/w normal/non contributor    Constitutional:  Patient looks  [x]        Sick  [x]        Frail  []        Better                                                 []        Depressed    HEENT:  [x]   NC                         []   AT               []    ALOPECIA           Eyes: []   Normal               [x]    Icteric  Oropharynx: []    Normal                  []  Thrush               [] Dry  Mucositis: [x]    None                 Grade: []        I  []        II  []        III  []        IV  Neck:   [x]   Supple                  []  Rigid               JVD:    [x]   ABSENT       []   PRESENT  Lymphadenopathy:   []   None Noted            []   PRESENT    Chest:  [x]   Clear               []    Rhonchi                      Dec'd @     []  Right Base           []   Left Base    CV:             [x]   Regular              []  Irregular               []   Tachy                []   Murmur  Abdominal:   []    Soft              [x]   NON-tender               []   Tender    distended w ascites  BS:    []   ABSENT                   [x]   PRESENT  Liver:     []  NON-palp                  [x]   EDGE- palp  Spleen: [x]   NON-palp                   [x]  EDGE - palp   ascites. Mass:   [x]   ABSENT                          []  PRESENT  Extr:    [x]  Lymphedema             []   Cyanosis      []  Clubbing  Edema:     []   NONE       [x]   PRESENT  Skin:  Intact [x]           Purpura []        Rash: []   ABSENT       []  PRESENT  Neuro:  [x]        Normal  []        Confused      Available labs reviewed:  Labs:    Recent Results (from the past 24 hour(s))   CELL COUNT, BODY FLUID    Collection Time: 09/17/22  3:00 PM   Result Value Ref Range    BODY FLUID TYPE ABDOMINAL FLUID      FLUID COLOR YELLOW      FLUID APPEARANCE HAZY      FLUID RBC CT. >100 (H) 0 /cu mm    FLUID NUCLEATED CELLS 41 /cu mm    FLD NEUTROPHILS 1 (A) NRRE %    FLD LYMPHS 20 (A) NRRE %    FLD MONO/MACROPHAGES 76 (A) NRRE %    FLUID MESOTHELIAL 3 (A) NRRE %   CULTURE, BODY FLUID W GRAM STAIN    Collection Time: 09/17/22  3:00 PM    Specimen: Abdominal Fluid;  Body Fluid   Result Value Ref Range    Special Requests: NO SPECIAL REQUESTS      GRAM STAIN NO WBC'S SEEN      GRAM STAIN NO ORGANISMS SEEN      Culture result: PENDING    CBC WITH AUTOMATED DIFF    Collection Time: 09/18/22  2:42 AM   Result Value Ref Range    WBC 6.0 3.6 - 11.0 K/uL    RBC 3.09 (L) 3.80 - 5.20 M/uL    HGB 10.3 (L) 11.5 - 16.0 g/dL    HCT 31.1 (L) 35.0 - 47.0 %    .6 (H) 80.0 - 99.0 FL    MCH 33.3 26.0 - 34.0 PG    MCHC 33.1 30.0 - 36.5 g/dL    RDW 21.0 (H) 11.5 - 14.5 %    PLATELET 816 (L) 486 - 400 K/uL    MPV 10.2 8.9 - 12.9 FL    NRBC 1.2 (H) 0  WBC    ABSOLUTE NRBC 0.07 (H) 0.00 - 0.01 K/uL    NEUTROPHILS 70 32 - 75 %    BAND NEUTROPHILS 2 0 - 6 %    LYMPHOCYTES 23 12 - 49 %    MONOCYTES 3 (L) 5 - 13 %    EOSINOPHILS 0 0 - 7 %    BASOPHILS 1 0 - 1 %    MYELOCYTES 1 (H) 0 %    IMMATURE GRANULOCYTES 0 %    ABS. NEUTROPHILS 4.3 1.8 - 8.0 K/UL    ABS. LYMPHOCYTES 1.4 0.8 - 3.5 K/UL    ABS. MONOCYTES 0.2 0.0 - 1.0 K/UL    ABS. EOSINOPHILS 0.0 0.0 - 0.4 K/UL    ABS. BASOPHILS 0.1 0.0 - 0.1 K/UL    ABS. IMM. GRANS. 0.0 K/UL    DF MANUAL      RBC COMMENTS ANISOCYTOSIS  2+        RBC COMMENTS MACROCYTOSIS  1+        RBC COMMENTS POLYCHROMASIA  PRESENT        RBC COMMENTS OVALOCYTES  PRESENT        RBC COMMENTS TEARDROP CELLS  PRESENT       METABOLIC PANEL, COMPREHENSIVE    Collection Time: 09/18/22  2:44 AM   Result Value Ref Range    Sodium 138 136 - 145 mmol/L    Potassium 3.6 3.5 - 5.1 mmol/L    Chloride 107 97 - 108 mmol/L    CO2 21 21 - 32 mmol/L    Anion gap 10 5 - 15 mmol/L    Glucose 146 (H) 65 - 100 mg/dL    BUN 7 6 - 20 MG/DL    Creatinine 0.57 0.55 - 1.02 MG/DL    BUN/Creatinine ratio 12 12 - 20      GFR est AA >60 >60 ml/min/1.73m2    GFR est non-AA >60 >60 ml/min/1.73m2    Calcium 7.4 (L) 8.5 - 10.1 MG/DL    Bilirubin, total 6.0 (H) 0.2 - 1.0 MG/DL    ALT (SGPT) 58 12 - 78 U/L    AST (SGOT) 169 (H) 15 - 37 U/L    Alk. phosphatase 224 (H) 45 - 117 U/L    Protein, total 5.2 (L) 6.4 - 8.2 g/dL    Albumin 2.1 (L) 3.5 - 5.0 g/dL    Globulin 3.1 2.0 - 4.0 g/dL    A-G Ratio 0.7 (L) 1.1 - 2.2         Available Xrays reviewed:    Chemotherapy monitored and toxicities assessed:    Assessment and Plan   Metastatic breast cancer with progressive liver mets. .  she has been through all available hormornal therapies as well as xeloda, and picqray. Dr. Angie Machado admitted her for palliative chemotherapy. Her bilirubin was 8 in the office this week and 6 here. A dose reduction in the carbo/taxol has been made to account for the bili . Cultures are no growth. Stop zosyn. BP stable. Labs reviewed   Plan for chemo today-dose reduced taxol and full dose carbo per Dr. Pavel Gillis orders. Pt signed consent. Handouts provided. Hypotension. Margarita Gip dehydration. On IVF    Anemia. Margarita Gip secondary to chronic disease +/- occult bleeding, will check cbc daily and transfuse prn    Ascites. . s/p para 3 L removed on 9/17  5. Debility- pt consult  6. Allergic reaction- had allergic reaction to doxil.  20mg of dex given as premed last night    Royal Heller MD

## 2022-09-18 NOTE — PROGRESS NOTES
Prior to chemotherapy administration the following were verified with a second chemotherapy certified nurse, Kaci Villeda RN         Patient name:  Lizeth Alston    Patient  or CSN: () 1960    Drug name: Carboplatin    Drug dose: 538mg    Infusion/drug volume: 328.8 ml    Rate of administration: 658cc/hr    Route of administration: IV     Expiration dates/times: BUD 2022 at 1630hr    Appearance and physical integrity of the drugs: clear and intact    Please see MAR for specific drug time of administration. Patient was monitored appropriately and vital signs were obtained. Patient tolerated treatment well. Positive blood return was maintained throughout treatment, line was flushed with positive blood return at conclusion. Patient was left in no apparent distress.

## 2022-09-18 NOTE — PROGRESS NOTES
Prior to chemotherapy administration the following were verified with a second chemotherapy certified nurse, Demetrice Cain RN           Patient name:  Dio Dickinson     Patient  or CSN: () 1960     Drug name: Taxol     Drug dose: 63mg     Infusion/drug volume: 120.5 ml     Rate of administration: 121cc/hr     Route of administration: IV      Expiration dates/times: BUD 2022 at 0830hr     Appearance and physical integrity of the drugs: clear and intact       Please see MAR for specific drug time of administration. Patient was monitored appropriately and vital signs were obtained see flowsheet. Patient tolerated treatment well. Positive blood return was maintained throughout treatment, line was flushed with positive blood return at conclusion. Patient was left in no apparent distress.

## 2022-09-19 NOTE — PROGRESS NOTES
Hematology-Oncology Progress Note    Isabelle Paredes  1960  [id number]  9/19/2022    Follow-up for: metastatic breast cancer     [x]        Chart notes since last visit reviewed   [x]        Medications reviewed for allergies and interactions       Case discussed with the following:         []                            [x]        Nursing Staff                                                                         []        Pathologist                                                                        []        FAMILY      Subjective:     Feels lousy; not eating; not much vomiting. Weak; can't walk without help.     Objective:   Patient Vitals for the past 24 hrs:   BP Temp Pulse Resp SpO2   09/19/22 1518 109/72 97.8 °F (36.6 °C) 65 18 97 %   09/19/22 0847 110/69 97.8 °F (36.6 °C) 65 18 96 %   09/19/22 0223 107/68 97.6 °F (36.4 °C) 65 18 97 %   09/18/22 1920 97/60 97.9 °F (36.6 °C) 73 19 97 %         REVIEW OF SYSTEMS:    Constitutional: negative fever, negative chills, negative weight loss  Eyes:   negative visual changes  ENT:   negative sore throat, tongue or lip swelling  Respiratory:  negative cough, negative dyspnea  Cards:  negative for chest pain, palpitations, lower extremity edema  GI:   negative for nausea, vomiting, diarrhea, and abdominal pain  Neuro:  negative for headaches, dizziness, vertigo  []                        Full ROS o/w normal/non contributor    Constitutional:  Patient looks  [x]        Sick  [x]        Frail  []        Better                                                 []        Depressed    HEENT:  [x]   NC                         []   AT               []    ALOPECIA           Eyes: []   Normal               [x]    Icteric  Oropharynx: []    Normal                  []  Thrush               []   Dry  Mucositis: [x]    None                 Grade: []        I  []        II  []        III  []        IV  Neck:   [x]   Supple                  []  Rigid               JVD: [x]   ABSENT       []   PRESENT  Lymphadenopathy:   []   None Noted            []   PRESENT    Chest:  [x]   Clear               []    Rhonchi                      Dec'd @     []  Right Base           []   Left Base    CV:             [x]   Regular              []  Irregular               []   Tachy                []   Murmur  Abdominal:   []    Soft              [x]   NON-tender               []   Tender    distended w ascites  BS:    []   ABSENT                   [x]   PRESENT  Liver:     []  NON-palp                  [x]   EDGE- palp  Spleen: [x]   NON-palp                   [x]  EDGE - palp   ascites. Mass:   [x]   ABSENT                          []  PRESENT  Extr:    [x]  Lymphedema             []   Cyanosis      []  Clubbing  Edema:     []   NONE       [x]   PRESENT  Skin:  Intact [x]           Purpura []        Rash: []   ABSENT       []  PRESENT  Neuro:  [x]        Normal  []        Confused      Available labs reviewed:  Labs:    Recent Results (from the past 24 hour(s))   METABOLIC PANEL, COMPREHENSIVE    Collection Time: 09/19/22  2:38 AM   Result Value Ref Range    Sodium 137 136 - 145 mmol/L    Potassium 3.8 3.5 - 5.1 mmol/L    Chloride 106 97 - 108 mmol/L    CO2 25 21 - 32 mmol/L    Anion gap 6 5 - 15 mmol/L    Glucose 181 (H) 65 - 100 mg/dL    BUN 11 6 - 20 MG/DL    Creatinine 0.60 0.55 - 1.02 MG/DL    BUN/Creatinine ratio 18 12 - 20      GFR est AA >60 >60 ml/min/1.73m2    GFR est non-AA >60 >60 ml/min/1.73m2    Calcium 7.0 (L) 8.5 - 10.1 MG/DL    Bilirubin, total 5.6 (H) 0.2 - 1.0 MG/DL    ALT (SGPT) 57 12 - 78 U/L    AST (SGOT) 198 (H) 15 - 37 U/L    Alk.  phosphatase 209 (H) 45 - 117 U/L    Protein, total 4.4 (L) 6.4 - 8.2 g/dL    Albumin 1.9 (L) 3.5 - 5.0 g/dL    Globulin 2.5 2.0 - 4.0 g/dL    A-G Ratio 0.8 (L) 1.1 - 2.2     CBC WITH AUTOMATED DIFF    Collection Time: 09/19/22  2:38 AM   Result Value Ref Range    WBC 6.4 3.6 - 11.0 K/uL    RBC 2.64 (L) 3.80 - 5.20 M/uL    HGB 8.7 (L) 11.5 - 16.0 g/dL    HCT 26.4 (L) 35.0 - 47.0 %    .0 (H) 80.0 - 99.0 FL    MCH 33.0 26.0 - 34.0 PG    MCHC 33.0 30.0 - 36.5 g/dL    RDW 21.0 (H) 11.5 - 14.5 %    PLATELET 436 (L) 667 - 400 K/uL    MPV 10.3 8.9 - 12.9 FL    NRBC 0.6 (H) 0  WBC    ABSOLUTE NRBC 0.04 (H) 0.00 - 0.01 K/uL    NEUTROPHILS 82 (H) 32 - 75 %    BAND NEUTROPHILS 3 0 - 6 %    LYMPHOCYTES 9 (L) 12 - 49 %    MONOCYTES 3 (L) 5 - 13 %    EOSINOPHILS 0 0 - 7 %    BASOPHILS 0 0 - 1 %    METAMYELOCYTES 2 (H) 0 %    MYELOCYTES 1 (H) 0 %    IMMATURE GRANULOCYTES 0 %    ABS. NEUTROPHILS 5.4 1.8 - 8.0 K/UL    ABS. LYMPHOCYTES 0.6 (L) 0.8 - 3.5 K/UL    ABS. MONOCYTES 0.2 0.0 - 1.0 K/UL    ABS. EOSINOPHILS 0.0 0.0 - 0.4 K/UL    ABS. BASOPHILS 0.0 0.0 - 0.1 K/UL    ABS. IMM. GRANS. 0.0 K/UL    DF MANUAL      RBC COMMENTS ANISOCYTOSIS  2+        RBC COMMENTS POLYCHROMASIA  1+           Available Xrays reviewed:    Chemotherapy monitored and toxicities assessed:    Assessment and Plan   Metastatic breast cancer with progressive liver mets. .  she has been through all available hormornal therapies as well as xeloda, and picqray. Sunday 9/18 received cycle I carbo/Taxol with full dose carbo and 1/2 dose of her weekly Taxol. Day 7 on Friday with dose adjusted for her counts and bilirubin. Hypotension. IVF had been stopped. Will resume at 125 per hour; also resume peritoneal drain up until another 3 liters. Profound anorexia. Will try Megace. She is on prophylactic enoxaparin. Anemia. .8.7; stool heme pos. Follow daily. Ascites. still symptomatic  5.    Debility- pt consult    Mart Lackey MD

## 2022-09-19 NOTE — PROGRESS NOTES
Reviewed chemo precautions with pt. Provided pt with \"Chemotherapy and You\" book. Pt denies questions at this time.

## 2022-09-20 NOTE — PROGRESS NOTES
Pt is laying in bed with even and unlabored respirations on room air. No complaints of pain at this time. No distress noted. Pt has been nauseous during the shift and was given anti-nausea medication. Pt was started on IV fluids while she has her paracentesis drain open and draining. Plan is to have 3 liters taken off then clamp the drain. 2800 ml has been taken off so far. Vital signs remain stable at this time. Safety precautions are in place. Bed in low position and locked. Call bell within reach. Problem: Falls - Risk of  Goal: *Absence of Falls  Description: Document Maricarmen Zaldivar Fall Risk and appropriate interventions in the flowsheet.   Outcome: Progressing Towards Goal  Note: Fall Risk Interventions:  Mobility Interventions: Communicate number of staff needed for ambulation/transfer         Medication Interventions: Assess postural VS orthostatic hypotension    Elimination Interventions: Call light in reach, Patient to call for help with toileting needs              Problem: Patient Education: Go to Patient Education Activity  Goal: Patient/Family Education  Outcome: Progressing Towards Goal     Problem: Patient Education: Go to Patient Education Activity  Goal: Patient/Family Education  Outcome: Progressing Towards Goal     Problem: Pain  Goal: *Control of Pain  Outcome: Progressing Towards Goal  Goal: *PALLIATIVE CARE:  Alleviation of Pain  Outcome: Progressing Towards Goal     Problem: Patient Education: Go to Patient Education Activity  Goal: Patient/Family Education  Outcome: Progressing Towards Goal     Problem: General Medical Care Plan  Goal: *Vital signs within specified parameters  Outcome: Progressing Towards Goal  Goal: *Labs within defined limits  Outcome: Progressing Towards Goal  Goal: *Absence of infection signs and symptoms  Outcome: Progressing Towards Goal  Goal: *Optimal pain control at patient's stated goal  Outcome: Progressing Towards Goal  Goal: *Skin integrity maintained  Outcome: Progressing Towards Goal  Goal: *Fluid volume balance  Outcome: Progressing Towards Goal  Goal: *Optimize nutritional status  Outcome: Progressing Towards Goal  Goal: *Anxiety reduced or absent  Outcome: Progressing Towards Goal  Goal: *Progressive mobility and function (eg: ADL's)  Outcome: Progressing Towards Goal     Problem: Patient Education: Go to Patient Education Activity  Goal: Patient/Family Education  Outcome: Progressing Towards Goal     Problem: Patient Education: Go to Patient Education Activity  Goal: Patient/Family Education  Outcome: Progressing Towards Goal

## 2022-09-20 NOTE — PROGRESS NOTES
Post Fall Documentation      Derik Martni witnessed/unwitnessed fall occurred on 09/20/2022 (Date) at 80 am (Time). The answers to the following questions summarize the fall: In the patient's own words,:  What were you attempting to do when you fell? Go to the bathroom/dream about [place] you know why you fell? Dreaming about [place] you have any pain/discomfort or any other complaints? NO  Which part of your body made contact with the floor or other object? Right side    Nurse:  Was this an assisted fall? no  Was fall witnessed? No  If witnessed, what part of the body made contact with the floor or other object? N/a  Patients mental status after the fall/when found: Alert and oriented  Any apparent injury:  No apparent injury  Immediate interventions for injury/suspected injury? No interventions needed  Patient assisted back to bed? Assist X1  Name of provider notified and time, any comments? Dr. Sailaja Read       Name of family member notified and time: Sierra Schooling       Immediate VS and physical assessment documented in flow sheets. Neuro assessment every hour x 4 (for potential head injury or unwitnessed fall) documented in flow sheets.       Sammy Johnson RN

## 2022-09-20 NOTE — PROGRESS NOTES
Hematology-Oncology Progress Note    Anil Mccormack  1960  [id number]  9/20/2022    Follow-up for: metastatic breast cancer     [x]        Chart notes since last visit reviewed   [x]        Medications reviewed for allergies and interactions       Case discussed with the following:         []                            [x]        Nursing Staff                                                                         []        Pathologist                                                                        [x]        FAMILY      Subjective:     Very sleepy today. Very weak. Had a fill without injury or hitting her head.      Objective:   Patient Vitals for the past 24 hrs:   BP Temp Pulse Resp SpO2   09/20/22 1354 129/76 97.9 °F (36.6 °C) 85 20 98 %   09/20/22 0851 128/72 97.9 °F (36.6 °C) 85 20 97 %   09/20/22 0824 113/71 97.5 °F (36.4 °C) 70 18 96 %   09/20/22 0358 123/85 97.9 °F (36.6 °C) 70 18 95 %   09/19/22 1951 121/77 -- 73 17 96 %   09/19/22 1912 119/69 97.7 °F (36.5 °C) 71 18 91 %   09/19/22 1847 121/81 -- 66 -- 93 %         REVIEW OF SYSTEMS:    Constitutional: negative fever, negative chills, negative weight loss  Eyes:   negative visual changes  ENT:   negative sore throat, tongue or lip swelling  Respiratory:  negative cough, negative dyspnea  Cards:  negative for chest pain, palpitations, lower extremity edema  GI:   negative for nausea, vomiting, diarrhea, and abdominal pain  Neuro:  negative for headaches, dizziness, vertigo  []                        Full ROS o/w normal/non contributor    Constitutional:  Patient looks  [x]        Sick  [x]        Frail  []        Better                                                 []        Depressed    HEENT:  [x]   NC                         []   AT               []    ALOPECIA           Eyes: []   Normal               [x]    Icteric  Oropharynx: []    Normal                  []  Thrush               []   Dry  Mucositis: [x]    None Grade: []        I  []        II  []        III  []        IV  Neck:   [x]   Supple                  []  Rigid               JVD:    [x]   ABSENT       []   PRESENT  Lymphadenopathy:   []   None Noted            []   PRESENT    Chest:  [x]   Clear               []    Rhonchi                      Dec'd @     []  Right Base           []   Left Base    CV:             [x]   Regular              []  Irregular               []   Tachy                []   Murmur  Abdominal:   []    Soft              [x]   NON-tender               []   Tender    distended w ascites  BS:    []   ABSENT                   [x]   PRESENT  Liver:     []  NON-palp                  [x]   EDGE- palp  Spleen: [x]   NON-palp                   [x]  EDGE - palp   ascites.   Mass:   [x]   ABSENT                          []  PRESENT  Extr:    [x]  Lymphedema             []   Cyanosis      []  Clubbing  Edema:     []   NONE       [x]   PRESENT  Skin:  Intact [x]           Purpura []        Rash: []   ABSENT       []  PRESENT  Neuro:  [x]        Normal  [x]        Confused      Available labs reviewed:  Labs:    Recent Results (from the past 24 hour(s))   CBC W/O DIFF    Collection Time: 09/20/22  1:58 AM   Result Value Ref Range    WBC 4.9 3.6 - 11.0 K/uL    RBC 2.71 (L) 3.80 - 5.20 M/uL    HGB 9.0 (L) 11.5 - 16.0 g/dL    HCT 27.6 (L) 35.0 - 47.0 %    .8 (H) 80.0 - 99.0 FL    MCH 33.2 26.0 - 34.0 PG    MCHC 32.6 30.0 - 36.5 g/dL    RDW 21.4 (H) 11.5 - 14.5 %    PLATELET 244 (L) 105 - 400 K/uL    MPV 10.7 8.9 - 12.9 FL    NRBC 1.4 (H) 0  WBC    ABSOLUTE NRBC 0.07 (H) 0.00 - 9.99 K/uL   METABOLIC PANEL, COMPREHENSIVE    Collection Time: 09/20/22  1:58 AM   Result Value Ref Range    Sodium 138 136 - 145 mmol/L    Potassium 3.8 3.5 - 5.1 mmol/L    Chloride 106 97 - 108 mmol/L    CO2 25 21 - 32 mmol/L    Anion gap 7 5 - 15 mmol/L    Glucose 170 (H) 65 - 100 mg/dL    BUN 14 6 - 20 MG/DL    Creatinine 0.53 (L) 0.55 - 1.02 MG/DL    BUN/Creatinine ratio 26 (H) 12 - 20      GFR est AA >60 >60 ml/min/1.73m2    GFR est non-AA >60 >60 ml/min/1.73m2    Calcium 6.8 (L) 8.5 - 10.1 MG/DL    Bilirubin, total 5.5 (H) 0.2 - 1.0 MG/DL    ALT (SGPT) 82 (H) 12 - 78 U/L    AST (SGOT) 471 (H) 15 - 37 U/L    Alk. phosphatase 211 (H) 45 - 117 U/L    Protein, total 4.5 (L) 6.4 - 8.2 g/dL    Albumin 2.1 (L) 3.5 - 5.0 g/dL    Globulin 2.4 2.0 - 4.0 g/dL    A-G Ratio 0.9 (L) 1.1 - 2.2         Available Xrays reviewed:    Chemotherapy monitored and toxicities assessed:    Assessment and Plan   Metastatic breast cancer with progressive liver mets. .  she has been through all available hormornal therapies as well as xeloda, and picqray. Sunday 9/18 received cycle I carbo/Taxol with full dose carbo and 1/2 dose of her weekly Taxol. Day 7 on Friday with dose adjusted for her counts and bilirubin. Hypotension. Normal now after hydration. Cut back on IV  Profoundly sleepy. Will review meds. Profound anorexia. Megace added. She is on prophylactic enoxaparin. Nutrition consult. She needs popsickles, soups, and good-tasting supplements.  to bring things in also. Anemia. .9.0; plts 119;; stool heme pos. Follow daily. Ascites. 3 more liters removed. Abd more flat; probably remove drain tomorrow.   5.   Debility- PT/OT consult    Lana Menendez MD

## 2022-09-20 NOTE — PROGRESS NOTES
Problem: Mobility Impaired (Adult and Pediatric)  Goal: *Acute Goals and Plan of Care (Insert Text)  Description: FUNCTIONAL STATUS PRIOR TO ADMISSION: Patient was independent and active without use of DME up until last week or so prior to admission with gradual decline in activity. Since admission was up ad marquez until today and fell in room. HOME SUPPORT PRIOR TO ADMISSION: The patient lived with spouse but did not require assist.    Physical Therapy Goals  Initiated 9/20/2022  1. Patient will move from supine to sit and sit to supine  and roll side to side in bed with supervision/set-up within 7 day(s). 2.  Patient will transfer from bed to chair and chair to bed with supervision/set-up using the least restrictive device within 7 day(s). 3.  Patient will perform sit to stand with supervision/set-up within 7 day(s). 4.  Patient will ambulate with supervision/set-up for 50 feet with the least restrictive device within 7 day(s). 5.  Patient will ascend/descend 12 stairs with 1 handrail(s) with minimal assistance/contact guard assist within 7 day(s). Outcome: Progressing Towards Goal   PHYSICAL THERAPY EVALUATION  Patient: Susan Lake (26 y.o. female)  Date: 9/20/2022  Primary Diagnosis: Dehydration [E86.0]       Precautions: fall; chemo precautions         ASSESSMENT  Based on the objective data described below, the patient presents with significant decline  in mobility since admitted following a fall this am.  Patient received in bed resting and spouse present. She has decreased strength, endurance for any activity, decreased balance and overall upright mobility. She reports dizziness with sitting and throughout session ; BP stable at 113/62; educated patient and spouse about taking time between transitions of supine to sit, educated on safety and use of bed alarm, only up with staff and to use RW, BSC.   Patient cognitive status questionable with her still insistent that her sister was with her this am to walk in the garden. She is pleasant and cooperative but very drowsy and kept eyes closed when supine and minimal conversation during session. At this time feel if she is to return home will need 24/7 supervision and assistance with all mobility. Would benefit from home health therapy and recommend staying on the first level which does have a full bath  . Recommend OT consult as well. Current Level of Function Impacting Discharge (mobility/balance): poor to fair balance, min assist ; limited upright mobility    Functional Outcome Measure: The patient scored Total Score: 7/28 on the Tinetti outcome measure which is indicative of high fall risk. Other factors to consider for discharge: far below independent baseline     Patient will benefit from skilled therapy intervention to address the above noted impairments. PLAN :  Recommendations and Planned Interventions: bed mobility training, transfer training, gait training, therapeutic exercises, patient and family training/education, and therapeutic activities      Frequency/Duration: Patient will be followed by physical therapy:  5 times a week to address goals. Recommendation for discharge: (in order for the patient to meet his/her long term goals)  Physical therapy at least 2 days/week in the home AND ensure assist and/or supervision for safety with mobility, ADLS    This discharge recommendation:  Has not yet been discussed the attending provider and/or case management    IF patient discharges home will need the following DME: bedside commode, gait belt, shower chair, and rolling walker         SUBJECTIVE:   Patient stated Kamini Cook was helping me.  in response to when she fell this am.    OBJECTIVE DATA SUMMARY:   HISTORY:    Past Medical History:   Diagnosis Date    Breast cancer ([facility] [address]     breast, mets to bone    Colitis     Hypertension     Hypothyroidism     Multiple thyroid nodules      Past Surgical History:   Procedure Laterality Date    HX HYSTERECTOMY      w/BSO    HX MASTECTOMY Bilateral     HX ORTHOPAEDIC      wrist    HX TONSILLECTOMY         Personal factors and/or comorbidities impacting plan of care: mets    Home Situation  Home Environment: (P) Private residence  # Steps to Enter: (P) 4  Rails to Enter: (P) Yes  One/Two Story Residence: (P) Two story  Living Alone: (P) No  Support Systems: (P) Spouse/Significant Other  Patient Expects to be Discharged to[de-identified] Home with home health  Current DME Used/Available at Home: (P) Grab bars  Tub or Shower Type: (P) Shower    EXAMINATION/PRESENTATION/DECISION MAKING:   Critical Behavior:  Neurologic State: Alert  Orientation Level: Disoriented to time, Oriented to person, Oriented to place, Oriented to situation      Range Of Motion:  AROM: Generally decreased, functional                       Strength:    Strength: Generally decreased, functional                    Tone & Sensation:   Tone: Normal              Sensation: Intact               Coordination:  Coordination: Generally decreased, functional  Functional Mobility:  Bed Mobility:  Rolling: Minimum assistance  Supine to Sit: Minimum assistance  Sit to Supine: Minimum assistance  Scooting: Stand-by assistance  Transfers:  Sit to Stand: Minimum assistance; Additional time  Stand to Sit: Minimum assistance                       Balance:   Sitting: Impaired; Without support  Sitting - Static: Fair (occasional)  Sitting - Dynamic: Fair (occasional)  Standing: Impaired; Without support  Standing - Static: Poor  Standing - Dynamic : Poor  Ambulation/Gait Training:  Distance (ft): 8 Feet (ft)  Assistive Device: Gait belt;Walker, rolling  Ambulation - Level of Assistance: Minimal assistance     Gait Description (WDL): Exceptions to WDL  Gait Abnormalities: Decreased step clearance;Shuffling gait; Path deviations              Speed/Arleen: Shuffled;Pace decreased (<100 feet/min)  Step Length: Left shortened;Right shortened        Functional Measure:  Tinetti test:    Sitting Balance: 0  Arises: 0  Attempts to Rise: 0  Immediate Standing Balance: 0  Standing Balance: 1  Nudged: 0  Eyes Closed: 1  Turn 360 Degrees - Continuous/Discontinuous: 0  Turn 360 Degrees - Steady/Unsteady: 0  Sitting Down: 0  Balance Score: 2 Balance total score  Indication of Gait: 0  R Step Length/Height: 1  L Step Length/Height: 1  R Foot Clearance: 0  L Foot Clearance: 0  Step Symmetry: 1  Step Continuity: 0  Path: 1  Trunk: 0  Walking Time: 1  Gait Score: 5 Gait total score  Total Score: 7/28 Overall total score         Tinetti Tool Score Risk of Falls  <19 = High Fall Risk  19-24 = Moderate Fall Risk  25-28 = Low Fall Risk  Tinetti ME. Performance-Oriented Assessment of Mobility Problems in Elderly Patients. [place] 68; [id number].  (Scoring Description: PT Bulletin Feb. 10, 1993)    Older adults: Germain Crystal et al, 2009; n = 1000 [facility] elderly evaluated with ABC, DEEPA, ADL, and IADL)  · Mean DEEPA score for males aged 69-68 years = 26.21(3.40)  · Mean DEEPA score for females age 69-68 years = 25.16(4.30)  · Mean DEEPA score for males over 80 years = 23.29(6.02)  · Mean DEEPA score for females over 80 years = 17.20(8.32)        Physical Therapy Evaluation Charge Determination   History Examination Presentation Decision-Making   MEDIUM  Complexity : 1-2 comorbidities / personal factors will impact the outcome/ POC  MEDIUM Complexity : 3 Standardized tests and measures addressing body structure, function, activity limitation and / or participation in recreation  MEDIUM Complexity : Evolving with changing characteristics  Other outcome measures Tinetti  HIGH       Based on the above components, the patient evaluation is determined to be of the following complexity level: MEDIUM    Pain Ratin    Activity Tolerance:   Poor and requires frequent rest breaks      After treatment patient left in no apparent distress:   Supine in bed, Call bell within reach, Bed / chair alarm activated, Caregiver / family present, and Side rails x 3    COMMUNICATION/EDUCATION:   The patients plan of care was discussed with: Registered nurse. Fall prevention education was provided and the patient/caregiver indicated understanding., Patient/family have participated as able in goal setting and plan of care. , and Patient/family agree to work toward stated goals and plan of care.     Thank you for this referral.  Meaghan Dickinson, PT   Time Calculation: 31 mins

## 2022-09-20 NOTE — PROGRESS NOTES
Problem: Self Care Deficits Care Plan (Adult)  Goal: *Acute Goals and Plan of Care (Insert Text)  Description: FUNCTIONAL STATUS PRIOR TO ADMISSION: Patient was independent and active without use of DME, however, per , pt progressively weaker over past 4 weeks. HOME SUPPORT: The patient lived with  but did not require assist.    Occupational Therapy Goals  Initiated 9/20/2022  1. Patient will perform grooming standing at sink with supervision/set-up within 7 day(s). 2.  Patient will perform bathing with supervision/set-up within 7 day(s). 3.  Patient will perform upper body dressing and lower body dressing with supervision/set-up within 7 day(s). 4.  Patient will perform toilet transfers with supervision/set-up within 7 day(s). 5.  Patient will perform all aspects of toileting with supervision/set-up within 7 day(s). 6.  Patient will participate in upper extremity therapeutic exercise/activities with modified independence for 10 minutes within 7 day(s). 7.  Patient will utilize energy conservation techniques during functional activities with verbal cues within 7 day(s). Outcome: Progressing Towards Goal   OCCUPATIONAL THERAPY EVALUATION  Patient: Anahi Cheng (19 y.o. female)  Date: 9/20/2022  Primary Diagnosis: Dehydration [E86.0]       Precautions:   Fall, Bed Alarm (chemo med precautions)    ASSESSMENT  Based on the objective data described below, the patient presents with decreased functional strength, sitting and standing balance, arousal/alertness, extreme fatigue, orientation, activity tolerance, and functional reach. Pt received sleeping in bed with  at bedside. Pt easily aroused, however, very drowsy and fatigue during evaluation. Pt oriented to person and place; unable to recall date (even year without cues). Pt required increased time to complete all tasks and notable weakness and high fall risk.  Pt had fall this morning, per  and nursing, chemo on Sunday followed by multiple medications that may be affecting pt level of arousal/orientation and functional strength. Pt performed bed mobility with Bernabe, sit<>stand with modA and increased verbal cues for safety with side-stepping at EOB with RW. Pt presents far below her functional baseline per chart review and  report. Pending progress with acute therapy and medical status, recommending rehab versus HHOT with supervision/assistance. Recommend initiation of HEP with light resistance band and functional mobility/ADLs as safe/tolerable. Current Level of Function Impacting Discharge (ADLs/self-care): arousal level, recent fall this morning    Functional Outcome Measure: The patient scored Total: 55/100 on the Barthel Index outcome measure which is indicative of being partially dependent in basic self-care. Other factors to consider for discharge: progressive weakness     Patient will benefit from skilled therapy intervention to address the above noted impairments. PLAN :  Recommendations and Planned Interventions: self care training, functional mobility training, therapeutic exercise, balance training, therapeutic activities, cognitive retraining, endurance activities, patient education, home safety training, and family training/education    Frequency/Duration: Patient will be followed by occupational therapy 5 times a week to address goals. Recommendation for discharge: (in order for the patient to meet his/her long term goals)  Occupational therapy at least 2 days/week in the home AND ensure assist and/or supervision for safety with all ADLs/mobility vs rehab pending progress    This discharge recommendation:  Has not yet been discussed the attending provider and/or case management    IF patient discharges home will need the following DME: shower chair, RW, BSC? SUBJECTIVE:   Patient stated I am tired.     OBJECTIVE DATA SUMMARY:   HISTORY:   Past Medical History:   Diagnosis Date Breast cancer (HCC)     breast, mets to bone    Colitis     Hypertension     Hypothyroidism     Multiple thyroid nodules      Past Surgical History:   Procedure Laterality Date    HX HYSTERECTOMY      w/BSO    HX MASTECTOMY Bilateral     HX ORTHOPAEDIC      wrist    HX TONSILLECTOMY         Expanded or extensive additional review of patient history:     Home Situation  Home Environment: Private residence  # Steps to Enter: 4  Rails to Enter: Yes  One/Two Story Residence: Two story  Living Alone: No  Support Systems: Spouse/Significant Other  Patient Expects to be Discharged to[de-identified] Home with home health  Current DME Used/Available at Home: None  Tub or Shower Type: Shower    Hand dominance: Right    EXAMINATION OF PERFORMANCE DEFICITS:  Cognitive/Behavioral Status:  Neurologic State: Drowsy  Orientation Level: Oriented to person;Disoriented to time;Oriented to situation;Oriented to place  Cognition: Decreased command following;Decreased attention/concentration  Perception: Appears intact  Perseveration: No perseveration noted  Safety/Judgement: Decreased awareness of environment;Decreased awareness of need for assistance;Decreased insight into deficits; Decreased awareness of need for safety    Skin: appears intact    Edema: none noted    Hearing:   WFL    Vision/Perceptual:                      Diplopia: No    Acuity: Able to read employee name badge without difficulty (unable to read clock on wall)    Corrective Lenses: Reading glasses    Range of Motion:    AROM: Generally decreased, functional  PROM: Generally decreased, functional                      Strength:    Strength: Generally decreased, functional                Coordination:  Coordination: Generally decreased, functional  Fine Motor Skills-Upper: Left Intact; Right Intact    Gross Motor Skills-Upper: Left Intact; Right Intact    Tone & Sensation:    Tone: Normal  Sensation: Intact                      Balance:  Sitting: Impaired; Without support  Sitting - Static: Fair (occasional)  Sitting - Dynamic: Fair (occasional)  Standing: Impaired; With support  Standing - Static: Fair;Constant support  Standing - Dynamic : Poor    Functional Mobility and Transfers for ADLs:  Bed Mobility:  Rolling: Minimum assistance  Supine to Sit: Minimum assistance; Additional time;Assist x1  Sit to Supine: Minimum assistance; Additional time;Assist x1  Scooting: Stand-by assistance    Transfers:  Sit to Stand: Moderate assistance; Additional time;Assist x1  Stand to Sit: Minimum assistance; Additional time;Assist x1  Bed to Chair:  (NT due to drowsiness)  Toilet Transfer :  (NT due to drowsiness)    ADL Assessment:  Feeding: Supervision;Setup    Oral Facial Hygiene/Grooming: Supervision;Setup (inferred seated)    Bathing: Moderate assistance (inferred if seated due to balance, alertness, cognition)    Type of Bath: Chlorhexidine (CHG)    Upper Body Dressing: Minimum assistance; Adaptive equipment; Additional time;Assist x1 (inferred)    Lower Body Dressing: Moderate assistance; Adaptive equipment; Additional time;Assist x1 (inferred)    Toileting: Moderate assistance; Adaptive equipment; Additional time;Assist x1 (inferred)                ADL Intervention and task modifications:                 Type of Bath: Chlorhexidine (CHG)                        Cognitive Retraining  Safety/Judgement: Decreased awareness of environment;Decreased awareness of need for assistance;Decreased insight into deficits; Decreased awareness of need for safety       Functional Measure:    Barthel Index:  Bathin  Bladder: 10  Bowels: 10  Groomin  Dressin  Feeding: 10  Mobility: 0  Stairs: 0  Toilet Use: 5  Transfer (Bed to Chair and Back): 10  Total: 55/100      The Barthel ADL Index: Guidelines  1. The index should be used as a record of what a patient does, not as a record of what a patient could do.   2. The main aim is to establish degree of independence from any help, physical or verbal, however minor and for whatever reason. 3. The need for supervision renders the patient not independent. 4. A patient's performance should be established using the best available evidence. Asking the patient, friends/relatives and nurses are the usual sources, but direct observation and common sense are also important. However direct testing is not needed. 5. Usually the patient's performance over the preceding 24-48 hours is important, but occasionally longer periods will be relevant. 6. Middle categories imply that the patient supplies over 50 per cent of the effort. 7. Use of aids to be independent is allowed. Score Interpretation (from [address] [facility] 83)    Independent   60-79 Minimally independent   40-59 Partially dependent   20-39 Very dependent   <20 Totally dependent     -Sheldon Fregoso., Barthel, DBekahW. (1965). Functional evaluation: the Barthel Index. [address] [facility] ([address] [place] [address] (1997). The Barthel activities of daily living index: self-reporting versus actual performance in the old (> or = 75 years). Journal [address] 45(7), [facility], [email or web address], Rashi Rivero., Santosh Mejiakner. (1999). Measuring the change in disability after inpatient rehabilitation; comparison of the responsiveness of the Barthel Index and Functional [place] Measure. Journal of Neurology, Neurosurgery, and Psychiatry, 66(4), 814-234. Kali Howell, NBekahJBELLA, TOM May, & Jorden Homans, MBekahA. (2004) Assessment of post-stroke quality of life in cost-effectiveness studies: The usefulness of the Barthel Index and the EuroQoL-5D.  Quality of Life Research, 15, 378-29     Occupational Therapy Evaluation Charge Determination   History Examination Decision-Making   MEDIUM Complexity : Expanded review of history including physical, cognitive and psychosocial  history  MEDIUM Complexity : 3-5 performance deficits relating to physical, cognitive , or psychosocial skils that result in activity limitations and / or participation restrictions MEDIUM Complexity : Patient may present with comorbidities that affect occupational performnce. Miniml to moderate modification of tasks or assistance (eg, physical or verbal ) with assesment(s) is necessary to enable patient to complete evaluation       Based on the above components, the patient evaluation is determined to be of the following complexity level: MEDIUM  Pain Rating:  No c/o pain    Activity Tolerance:   Poor    After treatment patient left in no apparent distress:    Supine in bed, Call bell within reach, Bed / chair alarm activated, Caregiver / family present, and Side rails x 3    COMMUNICATION/EDUCATION:   The patients plan of care was discussed with: Physical therapist and Registered nurse. Home safety education was provided and the patient/caregiver indicated understanding., Patient/family have participated as able in goal setting and plan of care. , and Patient/family agree to work toward stated goals and plan of care. This patients plan of care is appropriate for delegation to [facility].     Thank you for this referral.  Bettye Sanders, OT  Time Calculation: 23 mins

## 2022-09-21 NOTE — PROGRESS NOTES
DARIUS:    RUR 16%    Disposition: Home with spouse and HH. Referrals sent to [facility], [facility] and [facility] via [address] [place] [address],[facility].     Transportation: Spouse

## 2022-09-21 NOTE — PROGRESS NOTES
Attempted OT treatment session at [phone], however patient requiring new drain. Per reports from physical therapist, drain is excessively leaking at this time and patient is waiting for replacement. Will follow up as time permits. Thank you.

## 2022-09-21 NOTE — PROGRESS NOTES
Problem: Mobility Impaired (Adult and Pediatric)  Goal: *Acute Goals and Plan of Care (Insert Text)  Description: FUNCTIONAL STATUS PRIOR TO ADMISSION: Patient was independent and active without use of DME up until last week or so prior to admission with gradual decline in activity. Since admission was up ad marquez until today and fell in room. HOME SUPPORT PRIOR TO ADMISSION: The patient lived with spouse but did not require assist.    Physical Therapy Goals  Initiated 9/20/2022  1. Patient will move from supine to sit and sit to supine  and roll side to side in bed with supervision/set-up within 7 day(s). 2.  Patient will transfer from bed to chair and chair to bed with supervision/set-up using the least restrictive device within 7 day(s). 3.  Patient will perform sit to stand with supervision/set-up within 7 day(s). 4.  Patient will ambulate with supervision/set-up for 50 feet with the least restrictive device within 7 day(s). 5.  Patient will ascend/descend 12 stairs with 1 handrail(s) with minimal assistance/contact guard assist within 7 day(s). Outcome: Progressing Towards Goal   PHYSICAL THERAPY TREATMENT  Patient: Lesly Harrell (19 y.o. female)  Date: 9/21/2022  Diagnosis: Dehydration [E86.0] <principal problem not specified>      Precautions: Fall, Bed Alarm (chemo med precautions)  Chart, physical therapy assessment, plan of care and goals were reviewed. ASSESSMENT  Patient continues with skilled PT services and is progressing towards goals. Patient received in bed and drowsy but agreeable to therapy. Requiring slightly more assist with bed mobility but may be due to issues with drainage. Continues to have difficulty with sit to stand and general weakness. Slow processing as well as flat affect. Gait improved but with some poor safety awareness and requiring cues for safe stand to sit as she went to sit before getting close to chair.   Provided with HEP and red resistance bands and briefly went over with patient and spouse but will follow up next visit. Also verbally reviewed how to manage steps but will hopefully practice tomorrow. Provided gait belt as well. Spouse is very supportive. .     Current Level of Function Impacting Discharge (mobility/balance): min to mod assist    Other factors to consider for discharge: far below her baseline         PLAN :  Patient continues to benefit from skilled intervention to address the above impairments. Continue treatment per established plan of care. to address goals. Recommendation for discharge: (in order for the patient to meet his/her long term goals)  Physical therapy at least 2 days/week in the home AND ensure assist and/or supervision for safety with all mobility    This discharge recommendation:  Has not yet been discussed the attending provider and/or case management    IF patient discharges home will need the following DME: bedside commode, shower chair, and rolling walker       SUBJECTIVE:   Patient stated I'm wet.  referring to drain site    OBJECTIVE DATA SUMMARY:   Critical Behavior:  Neurologic State: Drowsy  Orientation Level: Oriented to person, Oriented to place, Oriented to situation, Disoriented to time  Cognition: Decreased attention/concentration, Impaired decision making  Safety/Judgement: Decreased awareness of environment, Decreased awareness of need for assistance, Decreased insight into deficits, Decreased awareness of need for safety  Functional Mobility Training:  Bed Mobility:  Rolling: Minimum assistance  Supine to Sit: Additional time; Moderate assistance     Scooting: Stand-by assistance        Transfers:  Sit to Stand: Minimum assistance; Additional time  Stand to Sit: Minimum assistance                             Balance:  Sitting: Impaired; Without support  Sitting - Static: Good (unsupported)  Sitting - Dynamic: Good (unsupported)  Standing: Impaired; With support  Standing - Static: Fair  Standing - Dynamic : Fair  Ambulation/Gait Training:  Distance (ft): 20 Feet (ft) (x2)  Assistive Device: Gait belt;Walker, rolling  Ambulation - Level of Assistance: Minimal assistance        Gait Abnormalities: Decreased step clearance              Speed/Arleen: Pace decreased (<100 feet/min)  Step Length: Right shortened;Left shortened            Therapeutic Exercises:   Provided HEP handout with resistance bands (red)  Pain Ratin    Activity Tolerance:   Fair and requires rest breaks    After treatment patient left in no apparent distress:   Sitting in chair, Call bell within reach, Bed / chair alarm activated, and Caregiver / family present    COMMUNICATION/COLLABORATION:   The patients plan of care was discussed with: Registered nurse.      Meaghan Dickinson, PT   Time Calculation: 45 mins

## 2022-09-21 NOTE — PROGRESS NOTES
Hematology-Oncology Progress Note    Kimberly Avila  1960  [id number]  9/21/2022    Follow-up for: metastatic breast cancer     [x]        Chart notes since last visit reviewed   [x]        Medications reviewed for allergies and interactions       Case discussed with the following:         []                            [x]        Nursing Staff                                                                         []        Pathologist                                                                        [x]        FAMILY      Subjective:     Awake this afternoon. But lying in bed without energy or appetite. Peritoneal drain fell out and now she has ascites draining out the hole.      Objective:   Patient Vitals for the past 24 hrs:   BP Temp Pulse Resp SpO2   09/21/22 1449 116/72 98.1 °F (36.7 °C) 91 18 98 %   09/21/22 0922 111/77 97.8 °F (36.6 °C) 90 16 97 %   09/21/22 0445 110/72 97.7 °F (36.5 °C) 86 18 94 %   09/20/22 2153 (!) 145/81 98 °F (36.7 °C) 77 16 98 %         REVIEW OF SYSTEMS:    Constitutional: negative fever, negative chills, negative weight loss  Eyes:   negative visual changes  ENT:   negative sore throat, tongue or lip swelling  Respiratory:  negative cough, negative dyspnea  Cards:  negative for chest pain, palpitations, lower extremity edema  GI:   negative for nausea, vomiting, diarrhea, and abdominal pain  Neuro:  negative for headaches, dizziness, vertigo  []                        Full ROS o/w normal/non contributor    Constitutional:  Patient looks  [x]        Sick  [x]        Frail  []        Better                                                 []        Depressed    HEENT:  [x]   NC                         []   AT               []    ALOPECIA           Eyes: []   Normal               [x]    Icteric  Oropharynx: []    Normal                  []  Thrush               []   Dry  Mucositis: [x]    None                 Grade: []        I  []        II  []        III  [] IV  Neck:   [x]   Supple                  []  Rigid               JVD:    [x]   ABSENT       []   PRESENT  Lymphadenopathy:   []   None Noted            []   PRESENT    Chest:  [x]   Clear               []    Rhonchi                      Dec'd @     []  Right Base           []   Left Base    CV:             [x]   Regular              []  Irregular               []   Tachy                []   Murmur  Abdominal:   []    Soft              [x]   NON-tender               []   Tender    distended w ascites  BS:    []   ABSENT                   [x]   PRESENT  Liver:     []  NON-palp                  [x]   EDGE- palp  Spleen: [x]   NON-palp                   [x]  EDGE - palp   ascites.   Mass:   [x]   ABSENT                          []  PRESENT  Extr:    [x]  Lymphedema             []   Cyanosis      []  Clubbing  Edema:     []   NONE       [x]   PRESENT  Skin:  Intact [x]           Purpura []        Rash: []   ABSENT       []  PRESENT  Neuro:  [x]        Normal  [x]        Confused      Available labs reviewed:  Labs:    Recent Results (from the past 24 hour(s))   CBC W/O DIFF    Collection Time: 09/21/22  5:05 AM   Result Value Ref Range    WBC 3.7 3.6 - 11.0 K/uL    RBC 2.85 (L) 3.80 - 5.20 M/uL    HGB 9.5 (L) 11.5 - 16.0 g/dL    HCT 28.6 (L) 35.0 - 47.0 %    .4 (H) 80.0 - 99.0 FL    MCH 33.3 26.0 - 34.0 PG    MCHC 33.2 30.0 - 36.5 g/dL    RDW 20.3 (H) 11.5 - 14.5 %    PLATELET 645 (L) 718 - 400 K/uL    MPV 9.8 8.9 - 12.9 FL    NRBC 0.5 (H) 0  WBC    ABSOLUTE NRBC 0.02 (H) 0.00 - 9.51 K/uL   METABOLIC PANEL, COMPREHENSIVE    Collection Time: 09/21/22  5:05 AM   Result Value Ref Range    Sodium 136 136 - 145 mmol/L    Potassium 4.0 3.5 - 5.1 mmol/L    Chloride 105 97 - 108 mmol/L    CO2 22 21 - 32 mmol/L    Anion gap 9 5 - 15 mmol/L    Glucose 158 (H) 65 - 100 mg/dL    BUN 16 6 - 20 MG/DL    Creatinine 0.50 (L) 0.55 - 1.02 MG/DL    BUN/Creatinine ratio 32 (H) 12 - 20      GFR est AA >60 >60 ml/min/1.73m2 GFR est non-AA >60 >60 ml/min/1.73m2    Calcium 6.7 (L) 8.5 - 10.1 MG/DL    Bilirubin, total 6.0 (H) 0.2 - 1.0 MG/DL    ALT (SGPT) 84 (H) 12 - 78 U/L    AST (SGOT) 398 (H) 15 - 37 U/L    Alk. phosphatase 219 (H) 45 - 117 U/L    Protein, total 4.6 (L) 6.4 - 8.2 g/dL    Albumin 2.1 (L) 3.5 - 5.0 g/dL    Globulin 2.5 2.0 - 4.0 g/dL    A-G Ratio 0.8 (L) 1.1 - 2.2         Available Xrays reviewed:    Chemotherapy monitored and toxicities assessed:    Assessment and Plan   Metastatic breast cancer with progressive liver mets. .  she has been through all available hormornal therapies as well as xeloda, and picqray. Sunday 9/18 received cycle I carbo/Taxol with full dose carbo and 1/2 dose of her weekly Taxol. Day 7 on Friday with dose adjusted for her counts and bilirubin. Hypotension. Resolved; continue 75 cc per hour NS  Awake this afternoon; out of bed a bit this am  Profound anorexia. Megace added. She is on prophylactic enoxaparin. Nutrition consult. She needs popsickles, soups, and good-tasting supplements.  to bring things in also. Anemia. .9.5; plts 108;; stool heme pos. Follow daily counts  Ascites. 3 more liters removed. Abd more flat; drain fell out; draining through fistula tract.  Apply ostomy bag.   5.   Debility- PT/OT consult    Gayl Apley, MD

## 2022-09-22 PROBLEM — E43 SEVERE PROTEIN-CALORIE MALNUTRITION (HCC): Chronic | Status: ACTIVE | Noted: 2022-01-01

## 2022-09-22 NOTE — CONSULTS
Comprehensive Nutrition Assessment    Type and Reason for Visit: Initial, Consult    Nutrition Recommendations/Plan:   Continue regular diet  Ensure clear TID, magic cup TID    Recommend continuing with megace, however megace can take weeks to increase appetite. Recommend initiation of tube feeds during this time. Pt is severely malnourished, has severe taste changes making food unappetizing, and is unable to meet protein/calorie needs orally. NGT vs. PEG pending pt goals of care? NGT may be more appropriate as megace will hopefully increase appetite. For either, recommend Nocturnal Jevity 1.5 @ 100 ml/hr x 12 hours (8p-8a) + 60 ml water flush q4h    Recommend adding thiamine 100 mg x 10 days to lessen risk of refeeding  Monitor Mg, Phos, K daily for refeeding risk       Malnutrition Assessment:  Malnutrition Status:  Severe malnutrition (09/22/22 1502)    Context:  Chronic illness     Findings of the 6 clinical characteristics of malnutrition:   Energy Intake:  75% or less est energy requirements for 1 month or longer  Weight Loss:  Greater than 5% over 1 month     Body Fat Loss:  Mild body fat loss, Orbital, Buccal region   Muscle Mass Loss:  Severe muscle mass loss, Clavicles (pectoralis &deltoids), Temples (temporalis), Hand (interosseous)  Fluid Accumulation:  Severe, Ascites   Strength:  Not performed        Nutrition Assessment:    Past Medical History:   Diagnosis Date    Breast cancer ([address]     breast, mets to bone    Colitis     Hypertension     Hypothyroidism     Multiple thyroid nodules      Consult appreciated. 63 y.o. female admitted with metastatic breast cancer diagnosed 2018 with progressive liver mets currently on chemotherapy. Pt has ascites- has had 3L of fluid removed. Visited pt at bedside,  Dilip Roldan present. Pt/ stated that she's been unable to eat more than 2-3 bites of any food at a time. She's lost 7-9# over the last 1-3 months which is significant.  Her sense of taste is altered d/t chemo and most foods taste very salty or bad. She was unable to provide me with any food choices that sound appealing. She doesn't like regular ensure but was unable to say why. She's open to ensure clear and magic cup to assist with kcal/pro intake, though doubtful she'll be able to eat everything I've ordered. She denied v/c/d today, was given zofran this morning for nausea. Noted that pt was started on megace this admission which will hopefully help but can take weeks to take effect on appetite. Given severity of pts malnutrition, recommend starting TF. We talked about doing a nocturnal feed so pt can still eat during the day. PEG vs. NGT pending pt goals? NGT may be more appropriate as megace will hopefully increase appetite. Recommendation if needed: Nocturnal Jevity 1.5 @ 100 ml/hr x 12 hours (8p-8a) + 60 ml water flush q4h. This will provide 1800 kcals (100% of needs), 77 g pro (92% of needs), 1152 ml free water (TF+flush), 259 g CHO. This recommendation will work for NGT or PEG. Labs: phos 1.7, Mg 2.7. Recommend thiamine 100mg x 10 days to prevent refeeding if feeds are initiated.      Recent Labs     09/22/22  0359 09/21/22  0505 09/20/22  0158   * 158* 170*   BUN 16 16 14   CREA 0.40* 0.50* 0.53*   * 136 138   K 4.0 4.0 3.8    105 106   CO2 24 22 25   CA 6.1* 6.7* 6.8*   PHOS 1.7*  --   --    MG 2.7*  --   --          Nutritionally Significant Medications:  Synthroid, megace, protonix, IV NaCl, zofran prn      Estimated Daily Nutrient Needs:  Energy Requirements Based On: Kcal/kg  Weight Used for Energy Requirements: Current  Energy (kcal/day): 7807-0850 (30-35 kcal/kg)  Weight Used for Protein Requirements: Current  Protein (g/day):  g (1.5-2 g/kg)  Method Used for Fluid Requirements: 1 ml/kcal  Fluid (ml/day): 1800    Nutrition Related Findings:   Edema: ascites  Last BM: 09/21/22, Loose    Wounds: None      Current Nutrition Therapies:  Diet: regular  Supplements: none  Meal intake: Patient Vitals for the past 168 hrs:   % Diet Eaten   09/22/22 0946 1 - 25%     Supplement intake: No data found. Nutrition Support: none      Anthropometric Measures:  Height: 5' 3\" (160 cm)  Ideal Body Weight (IBW): 115 lbs (52 kg)     Current Body Wt:  56.2 kg (123 lb 14.4 oz), 107.7 % IBW. Standing scale  Current BMI (kg/m2): 22  Usual Body Weight: 59.9 kg (132 lb)  % Weight Change (Calculated): -6.1  Weight Adjustment: No adjustment        Wt Readings from Last 10 Encounters:   09/16/22 56.2 kg (123 lb 14.4 oz)   08/08/22 59 kg (130 lb)   06/28/22 59.9 kg (132 lb)   04/12/21 53.1 kg (117 lb)   10/28/19 66.5 kg (146 lb 9.7 oz)   05/22/19 68 kg (150 lb)   08/06/18 65.8 kg (145 lb)   08/06/18 63.5 kg (140 lb)           Nutrition Diagnosis:   Severe malnutrition, In context of chronic illness related to catabolic illness as evidenced by Criteria as identified in malnutrition assessment  Inadequate oral intake related to altered taste perception as evidenced by weight loss greater than or equal to 5% in 1 month, intake 0-25%    Nutrition Interventions:   Food and/or Nutrient Delivery: Continue current diet, Start oral nutrition supplement, Start tube feeding  Nutrition Education/Counseling: No recommendations at this time  Coordination of Nutrition Care: Continue to monitor while inpatient       Goals:     Goals: PO intake 50% or greater, Initiate nutrition support, by next RD assessment       Nutrition Monitoring and Evaluation:   Behavioral-Environmental Outcomes: None identified  Food/Nutrient Intake Outcomes: Food and nutrient intake, Supplement intake, Enteral nutrition intake/tolerance  Physical Signs/Symptoms Outcomes: Biochemical data, Meal time behavior, Nutrition focused physical findings, Weight, Fluid status or edema    Discharge Planning:     Too soon to determine    Jimenez Gurrola RD  Available via [address] [place] [address]

## 2022-09-22 NOTE — PROGRESS NOTES
Problem: Self Care Deficits Care Plan (Adult)  Goal: *Acute Goals and Plan of Care (Insert Text)  Description: FUNCTIONAL STATUS PRIOR TO ADMISSION: Patient was independent and active without use of DME, however, per , pt progressively weaker over past 4 weeks. HOME SUPPORT: The patient lived with  but did not require assist.    Occupational Therapy Goals  Initiated 9/20/2022  1. Patient will perform grooming standing at sink with supervision/set-up within 7 day(s). 2.  Patient will perform bathing with supervision/set-up within 7 day(s). 3.  Patient will perform upper body dressing and lower body dressing with supervision/set-up within 7 day(s). 4.  Patient will perform toilet transfers with supervision/set-up within 7 day(s). 5.  Patient will perform all aspects of toileting with supervision/set-up within 7 day(s). 6.  Patient will participate in upper extremity therapeutic exercise/activities with modified independence for 10 minutes within 7 day(s). 7.  Patient will utilize energy conservation techniques during functional activities with verbal cues within 7 day(s). Outcome: Progressing Towards Goal     OCCUPATIONAL THERAPY TREATMENT  Patient: Luna Savage (52 y.o. female)  Date: 9/22/2022  Diagnosis: Dehydration [E86.0] <principal problem not specified>      Precautions: Fall, Bed Alarm (chemo med precautions)  Chart, occupational therapy assessment, plan of care, and goals were reviewed. ASSESSMENT  Patient continues with skilled OT services and is progressing towards goals, however, remains limited by low activity tolerance, impaired functional mobility, flat affect, generalized weakness and cognitive deficits. Pt cleared for therapy by nursing and received supine in bed, sleeping with  in room. Pt woke to voice and agreeable to therapy with encouragement. Completed bed mobility with Mod A and bed modified requiring cues for sequencing and initiation.  Pt completed sit>stand with RW (Mod A) for functional mobility to sink for teeth brushing with one seated rest break. Required Max A for sit>stand from chair and returned to bed (Mod-Max A for sit>supine). If discharged home pt will require assistance with all ADLs and functional mobility. Currently recommend HHOT to increase safety and independence. Current Level of Function Impacting Discharge (ADLs): Up to Max A for mobility, Set up-Min A for UB ADLs, Max A for LB ADLs    Other factors to consider for discharge: supportive spouse          PLAN :  Patient continues to benefit from skilled intervention to address the above impairments. Continue treatment per established plan of care to address goals. Recommend with staff: OOB to bathroom or BSC with Ax1-2 with RW, Pt participation in self care    Recommend next OT session: LB dressing     Recommendation for discharge: (in order for the patient to meet his/her long term goals)  Occupational therapy at least 2 days/week in the home AND ensure assist and/or supervision for safety with all ADLs and functional mobility     This discharge recommendation:  Has been made in collaboration with the attending provider and/or case management    IF patient discharges home will need the following DME: BSC       SUBJECTIVE:   Patient stated I can't remember.  (response to \"when's the last time you brushed your teeth? \")     OBJECTIVE DATA SUMMARY:   Cognitive/Behavioral Status:  Neurologic State: Drowsy  Orientation Level: Oriented to person;Oriented to place;Oriented to situation  Cognition: Follows commands;Decreased attention/concentration        Safety/Judgement: Decreased awareness of environment; Insight into deficits    Functional Mobility and Transfers for ADLs:  Bed Mobility:  Rolling: Minimum assistance  Supine to Sit: Moderate assistance;Assist x1;Bed Modified  Sit to Supine: Moderate assistance;Assist x1  Scooting: Minimum assistance    Transfers:  Sit to Stand:  Moderate assistance;Maximum assistance;Assist x1          Balance:  Sitting: Impaired  Sitting - Static: Good (unsupported)  Sitting - Dynamic: Good (unsupported)  Standing: Impaired  Standing - Static: Fair  Standing - Dynamic : Fair    ADL Intervention:       Grooming  Position Performed: Standing;Seated in chair  Brushing Teeth: Minimum assistance       Cognitive Retraining  Safety/Judgement: Decreased awareness of environment; Insight into deficits    Pain:  None reported     Activity Tolerance:   Fair and requires rest breaks    After treatment patient left in no apparent distress:   Supine in bed, Call bell within reach, Bed / chair alarm activated, Caregiver / family present, and Side rails x 3    COMMUNICATION/COLLABORATION:   The patients plan of care was discussed with: Physical therapist and Registered nurse.      Anastasiya Álvarez OT  Time Calculation: 24 mins

## 2022-09-22 NOTE — PROGRESS NOTES
Problem: Falls - Risk of  Goal: *Absence of Falls  Description: Document Gege Embs Fall Risk and appropriate interventions in the flowsheet.   Outcome: Progressing Towards Goal  Note: Fall Risk Interventions:  Mobility Interventions: Communicate number of staff needed for ambulation/transfer    Mentation Interventions: Adequate sleep, hydration, pain control, Bed/chair exit alarm    Medication Interventions: Patient to call before getting OOB, Teach patient to arise slowly, Bed/chair exit alarm    Elimination Interventions: Bed/chair exit alarm, Call light in reach    History of Falls Interventions: Bed/chair exit alarm         Problem: Patient Education: Go to Patient Education Activity  Goal: Patient/Family Education  Outcome: Progressing Towards Goal     Problem: Patient Education: Go to Patient Education Activity  Goal: Patient/Family Education  Outcome: Progressing Towards Goal     Problem: Pain  Goal: *Control of Pain  Outcome: Progressing Towards Goal  Goal: *PALLIATIVE CARE:  Alleviation of Pain  Outcome: Progressing Towards Goal     Problem: Patient Education: Go to Patient Education Activity  Goal: Patient/Family Education  Outcome: Progressing Towards Goal     Problem: General Medical Care Plan  Goal: *Vital signs within specified parameters  Outcome: Progressing Towards Goal  Goal: *Labs within defined limits  Outcome: Progressing Towards Goal  Goal: *Absence of infection signs and symptoms  Outcome: Progressing Towards Goal  Goal: *Optimal pain control at patient's stated goal  Outcome: Progressing Towards Goal  Goal: *Skin integrity maintained  Outcome: Progressing Towards Goal  Goal: *Fluid volume balance  Outcome: Progressing Towards Goal  Goal: *Optimize nutritional status  Outcome: Progressing Towards Goal  Goal: *Anxiety reduced or absent  Outcome: Progressing Towards Goal  Goal: *Progressive mobility and function (eg: ADL's)  Outcome: Progressing Towards Goal     Problem: Patient Education: Go to Patient Education Activity  Goal: Patient/Family Education  Outcome: Progressing Towards Goal     Problem: Patient Education: Go to Patient Education Activity  Goal: Patient/Family Education  Outcome: Progressing Towards Goal     Problem: Patient Education: Go to Patient Education Activity  Goal: Patient/Family Education  Outcome: Progressing Towards Goal     Problem: Patient Education: Go to Patient Education Activity  Goal: Patient/Family Education  Outcome: Progressing Towards Goal     Problem: Pressure Injury - Risk of  Goal: *Prevention of pressure injury  Description: Document Bernabe Scale and appropriate interventions in the flowsheet. Outcome: Progressing Towards Goal  Note: Pressure Injury Interventions:  Sensory Interventions: Assess changes in LOC, Keep linens dry and wrinkle-free    Moisture Interventions: Absorbent underpads, Minimize layers    Activity Interventions: Assess need for specialty bed    Mobility Interventions: HOB 30 degrees or less, Turn and reposition approx.  every two hours(pillow and wedges)    Nutrition Interventions: Document food/fluid/supplement intake    Friction and Shear Interventions: HOB 30 degrees or less                Problem: Patient Education: Go to Patient Education Activity  Goal: Patient/Family Education  Outcome: Progressing Towards Goal

## 2022-09-22 NOTE — PROGRESS NOTES
Problem: Mobility Impaired (Adult and Pediatric)  Goal: *Acute Goals and Plan of Care (Insert Text)  Description: FUNCTIONAL STATUS PRIOR TO ADMISSION: Patient was independent and active without use of DME up until last week or so prior to admission with gradual decline in activity. Since admission was up ad marquez until today and fell in room. HOME SUPPORT PRIOR TO ADMISSION: The patient lived with spouse but did not require assist.    Physical Therapy Goals  Initiated 9/20/2022  1. Patient will move from supine to sit and sit to supine  and roll side to side in bed with supervision/set-up within 7 day(s). 2.  Patient will transfer from bed to chair and chair to bed with supervision/set-up using the least restrictive device within 7 day(s). 3.  Patient will perform sit to stand with supervision/set-up within 7 day(s). 4.  Patient will ambulate with supervision/set-up for 50 feet with the least restrictive device within 7 day(s). 5.  Patient will ascend/descend 12 stairs with 1 handrail(s) with minimal assistance/contact guard assist within 7 day(s). Outcome: Progressing Towards Goal   PHYSICAL THERAPY TREATMENT  Patient: Kimberly Avila (66 y.o. female)  Date: 9/22/2022  Diagnosis: Dehydration [E86.0] <principal problem not specified>      Precautions: Fall, Bed Alarm (chemo med precautions)  Chart, physical therapy assessment, plan of care and goals were reviewed. ASSESSMENT  Patient continues with skilled PT services and is progressing towards goals. Patient received in bed and more alert and interactive today (slightly) . Continues to require significant assist to get to EOB, utilizing bed mechanics, bed rails to assist.  Practiced sit to stand and did best with cue to  put hands on knees to push up. Gait slightly more steady with rolling walker today. Worked on UE and LE exercises but only tolerates 5 reps max and requiring assist to complete range of motion.   Emphasized to patient the need to do exercises to improved strength and endurance with her hope to return home vs rehab. If patient returns home will need home health PT, bedside commode, rolling walker and would benefit from hospital bed to help ease the burden of care of caregivers. Would need medical transport home as well in order to get into home due to steps. .       Current Level of Function Impacting Discharge (mobility/balance): min to mod assist    Other factors to consider for discharge: prefers home ; spouse may benefit from additional help in home progress remains slow         PLAN :  Patient continues to benefit from skilled intervention to address the above impairments. Continue treatment per established plan of care. to address goals. Recommendation for discharge: (in order for the patient to meet his/her long term goals)  Physical therapy at least 2 days/week in the home AND ensure assist and/or supervision for safety with all mobility,     This discharge recommendation:  Has been made in collaboration with the attending provider and/or case management    IF patient discharges home will need the following DME: bedside commode, hospital bed, and rolling walker- spouse indicates has access to a wheelchair and shower chair. He may be willing to rent hospital bed if not covered by insurance. SUBJECTIVE:   Patient stated Popeye Galvan did I get so weak? Melisa Szymanski    OBJECTIVE DATA SUMMARY:   Critical Behavior:  Neurologic State: Alert  Orientation Level: Oriented X4  Cognition: Follows commands  Safety/Judgement: Decreased awareness of environment, Decreased awareness of need for assistance, Decreased insight into deficits, Decreased awareness of need for safety  Functional Mobility Training:  Bed Mobility:  Rolling: Minimum assistance  Supine to Sit: Moderate assistance; Additional time     Scooting: Minimum assistance        Transfers:  Sit to Stand: Minimum assistance;Contact guard assistance  Stand to Sit: Contact guard assistance Balance:  Sitting: Impaired; Without support  Sitting - Static: Good (unsupported)  Sitting - Dynamic: Good (unsupported)  Standing: Impaired; With support  Standing - Static: Fair  Standing - Dynamic : Fair  Ambulation/Gait Training:  Distance (ft): 20 Feet (ft)  Assistive Device: Gait belt;Walker, rolling  Ambulation - Level of Assistance: Contact guard assistance        Gait Abnormalities: Decreased step clearance              Speed/Arleen: Pace decreased (<100 feet/min)  Step Length: Left shortened;Right shortened                  Therapeutic Exercises:   Knee extension; hip flexion in sitting with assist; UE shoulder/elbow resistance band exercises  Pain Ratin    Activity Tolerance:   Poor and requires rest breaks    After treatment patient left in no apparent distress:   Sitting in chair, Call bell within reach, and Caregiver / family present    COMMUNICATION/COLLABORATION:   The patients plan of care was discussed with: Registered nurse and Case management.      Sen Uriostegui, PT   Time Calculation: 29 mins

## 2022-09-22 NOTE — PROGRESS NOTES
DARIUS- Home with [address] noted that referrals for home health were sent out and Krist[facility] accepted this pt for home health.  CM placed this information on pt's AVS. KIRAN Jane,ACM-SW

## 2022-09-22 NOTE — PROGRESS NOTES
CM noted that this pt has orders for a RW,BSC and hospital bed. CM sent a referral for these items to [facility].  Malena Ackerman

## 2022-09-22 NOTE — PROGRESS NOTES
Hematology-Oncology Progress Note    Chaim Bojorquez  1960  [id number]  9/22/2022    Follow-up for: metastatic breast cancer     [x]        Chart notes since last visit reviewed   [x]        Medications reviewed for allergies and interactions       Case discussed with the following:         []                            [x]        Nursing Staff                                                                         []        Pathologist                                                                        [x]        FAMILY      Subjective:   Awake this afternoon. But has been sleeping most of the day. Not up much. Ostomy bag on peritoneal fistula. Nausea; no vomiting; weak; no pain.      Objective:   Patient Vitals for the past 24 hrs:   BP Temp Pulse Resp SpO2 Height   09/22/22 1500 -- -- -- -- -- 5' 3\" (1.6 m)   09/22/22 1410 100/65 98.1 °F (36.7 °C) 77 16 98 % --   09/22/22 0839 113/73 97.7 °F (36.5 °C) 75 16 95 % --   09/22/22 0254 129/70 97.7 °F (36.5 °C) 71 15 96 % --   09/21/22 2001 117/74 97.3 °F (36.3 °C) 75 16 95 % --         REVIEW OF SYSTEMS:    Constitutional: negative fever, negative chills, negative weight loss  Eyes:   negative visual changes  ENT:   negative sore throat, tongue or lip swelling  Respiratory:  negative cough, negative dyspnea  Cards:  negative for chest pain, palpitations, lower extremity edema  GI:   negative for nausea, vomiting, diarrhea, and abdominal pain  Neuro:  negative for headaches, dizziness, vertigo  []                        Full ROS o/w normal/non contributor    Constitutional:  Patient looks  [x]        Sick  [x]        Frail  []        Better                                                 []        Depressed    HEENT:  [x]   NC                         []   AT               []    ALOPECIA           Eyes: []   Normal               [x]    Icteric  Oropharynx: []    Normal                  []  Thrush               []   Dry  Mucositis: [x]    None Grade: []        I  []        II  []        III  []        IV  Neck:   [x]   Supple                  []  Rigid               JVD:    [x]   ABSENT       []   PRESENT  Lymphadenopathy:   []   None Noted            []   PRESENT    Chest:  [x]   Clear               []    Rhonchi                      Dec'd @     []  Right Base           []   Left Base    CV:             [x]   Regular              []  Irregular               []   Tachy                []   Murmur  Abdominal:   []    Soft              [x]   NON-tender               []   Tender    distended w ascites  BS:    []   ABSENT                   [x]   PRESENT  Liver:     []  NON-palp                  [x]   EDGE- palp  Spleen: [x]   NON-palp                   [x]  EDGE - palp   ascites. Mass:   [x]   ABSENT                          []  PRESENT  Extr:    [x]  Lymphedema             []   Cyanosis      []  Clubbing  Edema:     []   NONE       [x]   PRESENT  Skin:  Intact [x]           Purpura []        Rash: []   ABSENT       []  PRESENT  Neuro:  [x]        Normal  [x]        Confused      Available labs reviewed:  Labs:    Recent Results (from the past 24 hour(s))   CBC WITH AUTOMATED DIFF    Collection Time: 09/22/22  3:59 AM   Result Value Ref Range    WBC 2.8 (L) 3.6 - 11.0 K/uL    RBC 2.72 (L) 3.80 - 5.20 M/uL    HGB 9.0 (L) 11.5 - 16.0 g/dL    HCT 26.9 (L) 35.0 - 47.0 %    MCV 98.9 80.0 - 99.0 FL    MCH 33.1 26.0 - 34.0 PG    MCHC 33.5 30.0 - 36.5 g/dL    RDW 19.7 (H) 11.5 - 14.5 %    PLATELET 96 (L) 326 - 400 K/uL    MPV 10.8 8.9 - 12.9 FL    NRBC 0.0 0  WBC    ABSOLUTE NRBC 0.00 0.00 - 0.01 K/uL    NEUTROPHILS 87 (H) 32 - 75 %    BAND NEUTROPHILS 1 0 - 6 %    LYMPHOCYTES 8 (L) 12 - 49 %    MONOCYTES 3 (L) 5 - 13 %    EOSINOPHILS 0 0 - 7 %    BASOPHILS 0 0 - 1 %    METAMYELOCYTES 1 (H) 0 %    IMMATURE GRANULOCYTES 0 %    ABS. NEUTROPHILS 2.5 1.8 - 8.0 K/UL    ABS. LYMPHOCYTES 0.2 (L) 0.8 - 3.5 K/UL    ABS. MONOCYTES 0.1 0.0 - 1.0 K/UL    ABS. EOSINOPHILS 0.0 0.0 - 0.4 K/UL    ABS. BASOPHILS 0.0 0.0 - 0.1 K/UL    ABS. IMM. GRANS. 0.0 K/UL    DF MANUAL      RBC COMMENTS ANISOCYTOSIS  2+        RBC COMMENTS MACROCYTOSIS  1+        WBC COMMENTS REACTIVE LYMPHS     METABOLIC PANEL, COMPREHENSIVE    Collection Time: 09/22/22  3:59 AM   Result Value Ref Range    Sodium 135 (L) 136 - 145 mmol/L    Potassium 4.0 3.5 - 5.1 mmol/L    Chloride 105 97 - 108 mmol/L    CO2 24 21 - 32 mmol/L    Anion gap 6 5 - 15 mmol/L    Glucose 145 (H) 65 - 100 mg/dL    BUN 16 6 - 20 MG/DL    Creatinine 0.40 (L) 0.55 - 1.02 MG/DL    BUN/Creatinine ratio 40 (H) 12 - 20      GFR est AA >60 >60 ml/min/1.73m2    GFR est non-AA >60 >60 ml/min/1.73m2    Calcium 6.1 (LL) 8.5 - 10.1 MG/DL    Bilirubin, total 6.0 (H) 0.2 - 1.0 MG/DL    ALT (SGPT) 84 (H) 12 - 78 U/L    AST (SGOT) 376 (H) 15 - 37 U/L    Alk. phosphatase 223 (H) 45 - 117 U/L    Protein, total 4.3 (L) 6.4 - 8.2 g/dL    Albumin 2.0 (L) 3.5 - 5.0 g/dL    Globulin 2.3 2.0 - 4.0 g/dL    A-G Ratio 0.9 (L) 1.1 - 2.2     MAGNESIUM    Collection Time: 09/22/22  3:59 AM   Result Value Ref Range    Magnesium 2.7 (H) 1.6 - 2.4 mg/dL   PHOSPHORUS    Collection Time: 09/22/22  3:59 AM   Result Value Ref Range    Phosphorus 1.7 (L) 2.6 - 4.7 MG/DL       Available Xrays reviewed:    Chemotherapy monitored and toxicities assessed:    Assessment and Plan   Metastatic breast cancer with progressive liver mets. .  she has been through all available hormornal therapies as well as xeloda, and picqray. Sunday 9/18 received cycle I carbo/Taxol with full dose carbo and 1/2 dose of her weekly Taxol. Will try to order her next dose of Taxol tomorrow . Thrombocytopenia - 96 today and leukopenia, but ANC is 2.5. Carbo causes thrombocytopenia, but we can transfuse if necessary. Follow daily counts  Hypotension. Lowish; urinating about twice per day. I think she's on the dry side.  Will add some glucose and MVI  Malnutrition; soon will have to decide whether to do feeding tube or TPN versus hospice. Profound anorexia. Megace added. She is on prophylactic enoxaparin. Nutrition consult. She needs popsickles, soups, and good-tasting supplements.  to bring things in also. I urged her to sip on the clear supplements  Ascites - still leaking through fistula tract into ostomy bag. Debility- PT/OT consult     asked if they should be talking to hospice. I believe it is premature for that, but they should be talking about hospice. We discussed that her disease is not curable. We'll know in the next week if she is going to have enough response to chemotherapy to improve and eat and get back on her feet and get home. If this doesn't happen, then her life expectancy will be quite short and hospice will be appropriate. CA27-29 is down 400+ to 330 from last week, but it was different lab. ? Does that mean anything. MEGGAN Bilangely stable.      Shama East MD

## 2022-09-22 NOTE — PROGRESS NOTES
Spiritual Care Partner Volunteer visited patient at [facility] [address] in [facility] on 9/22/2022   Documented by:    Rev. Chadwick Florentino MDiv, MS, [facility]  Staff

## 2022-09-22 NOTE — PROGRESS NOTES
DARIUS- Pending referral for DME to [facility]    ARSEN received a call from a rep from [facility] stating that they do not have a hospital bed in stock and they do not know when they will have any. She also said that the [facility] is not covered but they could provide a walker. ARSEN called Eli with [facility] ([phone]) to ask about the availability of a hospital bed. They should have beds in 2 weeks. She said that they should be able to have pt's BSC and RW covered by pt's insurance. ARSEN faxed this referral for all of the DME to [facility]. Will follow.  Troy Force

## 2022-09-23 NOTE — PROGRESS NOTES
Problem: Self Care Deficits Care Plan (Adult)  Goal: *Acute Goals and Plan of Care (Insert Text)  Description: FUNCTIONAL STATUS PRIOR TO ADMISSION: Patient was independent and active without use of DME, however, per , pt progressively weaker over past 4 weeks. HOME SUPPORT: The patient lived with  but did not require assist.    Occupational Therapy Goals  Initiated 9/20/2022  1. Patient will perform grooming standing at sink with supervision/set-up within 7 day(s). 2.  Patient will perform bathing with supervision/set-up within 7 day(s). 3.  Patient will perform upper body dressing and lower body dressing with supervision/set-up within 7 day(s). 4.  Patient will perform toilet transfers with supervision/set-up within 7 day(s). 5.  Patient will perform all aspects of toileting with supervision/set-up within 7 day(s). 6.  Patient will participate in upper extremity therapeutic exercise/activities with modified independence for 10 minutes within 7 day(s). 7.  Patient will utilize energy conservation techniques during functional activities with verbal cues within 7 day(s). Outcome: Not Progressing Towards Goal   OCCUPATIONAL THERAPY TREATMENT  Patient: Camryn Graves (11 y.o. female)  Date: 9/23/2022  Diagnosis: Dehydration [E86.0] <principal problem not specified>      Precautions: Fall, Bed Alarm (chemo med precautions)  Chart, occupational therapy assessment, plan of care, and goals were reviewed. ASSESSMENT  Patient continues with skilled OT services and is not progressing towards goals. Pt's performance of ADL/IADL tasks continues to be limited at this time by impaired cognition (command following, processing, orientation, attention), generalized/global weakness, impaired balance, and poor activity tolerance. Pt received with bed alarm alarming and pt transferring onto toilet with . Pt with poor sitting balance, requiring BUE support on grab bars and RW.  Pt requiring new brief and donned with max A d/t poor sitting balance, increased fatigue, and impaired volition/active participation in task. Multiple sit<>stand transfers trialed with use of BUE on grab bars, RW, and on knees for support however pt ultimately unable to achieve standing posture without total A and total manual cues for hip/trunk extension. Squat pivot performed from toilet to chair with total A. Pt repositioned in bedside chair (total A) and left seated, set up with lunch tray, and all needs met. Noted pt and family would prefer for pt to return home. If d/c plan is to return home, pt will require hospital bed, bedside commode, RW, and physical assistance of 2 people for all functional transfers/OOB mobility and up to total assist for ADL tasks to ensure pt safety. Current Level of Function Impacting Discharge (ADLs): max to total A toileting, LB dressing, and functional transfers in prep for ADL tasks     Other factors to consider for discharge: PLOF, would like to return home          PLAN :  Patient continues to benefit from skilled intervention to address the above impairments. Continue treatment per established plan of care to address goals. Recommendation for discharge: (in order for the patient to meet his/her long term goals)  Continue to recommend d/c to rehab. However, noted pt and family would prefer for pt to return home. If d/c plan is to return home, pt will require hospital bed, bedside commode, RW, and physical assistance of 2 people for all functional transfers/OOB mobility and up to total assist for ADL tasks. This discharge recommendation:  Has been made in collaboration with the attending provider and/or case management    IF patient discharges home will need the following DME: bedside commode, gait belt, hospital bed, and walker: rolling       SUBJECTIVE:   Patient stated I can't.     OBJECTIVE DATA SUMMARY:   Cognitive/Behavioral Status:  Neurologic State: Alert  Orientation Level: Oriented to person;Oriented to place; Disoriented to time;Disoriented to situation  Cognition: Follows commands;Decreased attention/concentration  Perception: Appears intact  Perseveration: No perseveration noted  Safety/Judgement: Decreased awareness of environment;Decreased awareness of need for assistance;Decreased awareness of need for safety;Decreased insight into deficits    Functional Mobility and Transfers for ADLs:  Bed Mobility:  Scooting: Total assistance (in bedside chair)    Transfers:  Sit to Stand: Maximum assistance; Total assistance  Functional Transfers  Toilet Transfer : Maximum assistance; Total assistance  Cues: Tactile cues provided;Verbal cues provided;Physical assistance       Balance:  Sitting: Impaired  Sitting - Static: Fair (occasional)  Sitting - Dynamic: Fair (occasional); Poor (constant support)  Standing: Impaired; With support  Standing - Static: Poor;Constant support  Standing - Dynamic : Poor;Constant support    ADL Intervention:                                Lower Body Dressing Assistance  Underpants: Maximum assistance; Total assistance (dependent)  Position Performed:  (seated on toilet)  Cues: Verbal cues provided;Physical assistance    Toileting  Toileting Assistance: Maximum assistance  Bladder Hygiene: Contact guard assistance  Clothing Management: Maximum assistance  Cues: Verbal cues provided; Tactile cues provided;Visual cues provided;Physical assistance for pants up;Physical assistance for pants down  Adaptive Equipment: Grab bars; Walker    Cognitive Retraining  Safety/Judgement: Decreased awareness of environment;Decreased awareness of need for assistance;Decreased awareness of need for safety;Decreased insight into deficits      Pain:  None reported     Activity Tolerance:   Poor    After treatment patient left in no apparent distress:   Sitting in chair, Call bell within reach, Bed / chair alarm activated, and Caregiver / family present    COMMUNICATION/COLLABORATION:   The patients plan of care was discussed with: Registered nurse.      Stacy Morelos OT  Time Calculation: 23 mins

## 2022-09-23 NOTE — PROGRESS NOTES
Pt is laying in bed with even and unlabored respirations on room air. No complaints of pain at this time. No distress noted. Iv fluids were changed during the shift. Paracentesis site is draining yellow fluid and the urostomy bag over top of the site is intact. Plan is for the pt to receive chemotherapy tomorrow. Pt has a poor appetite and ate between 0-25% of each meal throughout the shift. Physical therapy worked with the pt. Pts urine has a pink tinge to it. Safety precautions are in place. Bed in low position and locked. Call bell within reach. Problem: Falls - Risk of  Goal: *Absence of Falls  Description: Document Nicolle Lancaster Fall Risk and appropriate interventions in the flowsheet.   Outcome: Progressing Towards Goal  Note: Fall Risk Interventions:  Mobility Interventions: Bed/chair exit alarm, Communicate number of staff needed for ambulation/transfer, Patient to call before getting OOB    Mentation Interventions: Bed/chair exit alarm, Adequate sleep, hydration, pain control, Door open when patient unattended, Evaluate medications/consider consulting pharmacy    Medication Interventions: Bed/chair exit alarm, Evaluate medications/consider consulting pharmacy    Elimination Interventions: Bed/chair exit alarm, Call light in reach, Patient to call for help with toileting needs    History of Falls Interventions: Bed/chair exit alarm, Door open when patient unattended, Evaluate medications/consider consulting pharmacy         Problem: Patient Education: Go to Patient Education Activity  Goal: Patient/Family Education  Outcome: Progressing Towards Goal     Problem: Patient Education: Go to Patient Education Activity  Goal: Patient/Family Education  Outcome: Progressing Towards Goal     Problem: Pain  Goal: *Control of Pain  Outcome: Progressing Towards Goal  Goal: *PALLIATIVE CARE:  Alleviation of Pain  Outcome: Progressing Towards Goal     Problem: Patient Education: Go to Patient Education Activity  Goal: Patient/Family Education  Outcome: Progressing Towards Goal     Problem: General Medical Care Plan  Goal: *Vital signs within specified parameters  Outcome: Progressing Towards Goal  Goal: *Labs within defined limits  Outcome: Progressing Towards Goal  Goal: *Absence of infection signs and symptoms  Outcome: Progressing Towards Goal  Goal: *Optimal pain control at patient's stated goal  Outcome: Progressing Towards Goal  Goal: *Skin integrity maintained  Outcome: Progressing Towards Goal  Goal: *Fluid volume balance  Outcome: Progressing Towards Goal  Goal: *Optimize nutritional status  Outcome: Progressing Towards Goal  Goal: *Anxiety reduced or absent  Outcome: Progressing Towards Goal  Goal: *Progressive mobility and function (eg: ADL's)  Outcome: Progressing Towards Goal     Problem: Patient Education: Go to Patient Education Activity  Goal: Patient/Family Education  Outcome: Progressing Towards Goal     Problem: Patient Education: Go to Patient Education Activity  Goal: Patient/Family Education  Outcome: Progressing Towards Goal     Problem: Patient Education: Go to Patient Education Activity  Goal: Patient/Family Education  Outcome: Progressing Towards Goal     Problem: Patient Education: Go to Patient Education Activity  Goal: Patient/Family Education  Outcome: Progressing Towards Goal     Problem: Pressure Injury - Risk of  Goal: *Prevention of pressure injury  Description: Document Bernabe Scale and appropriate interventions in the flowsheet.   Outcome: Progressing Towards Goal     Problem: Patient Education: Go to Patient Education Activity  Goal: Patient/Family Education  Outcome: Progressing Towards Goal

## 2022-09-23 NOTE — PROGRESS NOTES
Hematology-Oncology Progress Note    Charles Gloria  1960  [id number]  9/23/2022    Follow-up for: metastatic breast cancer     [x]        Chart notes since last visit reviewed   [x]        Medications reviewed for allergies and interactions       Case discussed with the following:         []                            [x]        Nursing Staff esperanza                                                                        []        Pathologist                                                                        [x]        FAMILY       Subjective:     Having some nausea today, mild abd.  Discomfort, sleepy, answers questions    Objective:   Patient Vitals for the past 24 hrs:   BP Temp Pulse Resp SpO2   09/23/22 1430 99/66 97.3 °F (36.3 °C) 95 16 99 %   09/23/22 0810 100/67 97.8 °F (36.6 °C) 83 16 98 %   09/23/22 0240 112/71 97.6 °F (36.4 °C) 74 18 97 %   09/22/22 1905 105/65 97.8 °F (36.6 °C) 84 17 97 %         REVIEW OF SYSTEMS:    Constitutional: negative fever, negative chills, negative weight loss  Eyes:   negative visual changes  ENT:   negative sore throat, tongue or lip swelling  Respiratory:  negative cough, negative dyspnea  Cards:  negative for chest pain, palpitations, lower extremity edema  GI:   negative for nausea, vomiting, diarrhea, and abdominal pain  Neuro:  negative for headaches, dizziness, vertigo  []                        Full ROS o/w normal/non contributor    Constitutional:  Patient looks  [x]        Sick  [x]        Frail  []        Better                                                 []        Depressed    HEENT:  [x]   NC                         []   AT               []    ALOPECIA           Eyes: []   Normal               [x]    Icteric  Oropharynx: []    Normal                  []  Thrush               []   Dry  Mucositis: [x]    None                 Grade: []        I  []        II  []        III  []        IV  Neck:   [x]   Supple                  []  Rigid JVD:    [x]   ABSENT       []   PRESENT  Lymphadenopathy:   []   None Noted            []   PRESENT    Chest:  [x]   Clear               []    Rhonchi                      Dec'd @     []  Right Base           []   Left Base    CV:             [x]   Regular              []  Irregular               []   Tachy                []   Murmur  Abdominal:   []    Soft              [x]   NON-tender               []   Tender    distended w ascites  BS:    []   ABSENT                   [x]   PRESENT  Liver:     []  NON-palp                  [x]   EDGE- palp  Spleen: [x]   NON-palp                   [x]  EDGE - palp   ascites. Mass:   [x]   ABSENT                          []  PRESENT  Extr:    [x]  Lymphedema             []   Cyanosis      []  Clubbing  Edema:     []   NONE       [x]   PRESENT  Skin:  Intact [x]           Purpura []        Rash: []   ABSENT       []  PRESENT  Neuro:  [x]        Normal  [x]        Confused      Available labs reviewed:  Labs:    Recent Results (from the past 24 hour(s))   CBC WITH AUTOMATED DIFF    Collection Time: 09/23/22  3:34 AM   Result Value Ref Range    WBC 1.8 (L) 3.6 - 11.0 K/uL    RBC 2.79 (L) 3.80 - 5.20 M/uL    HGB 9.3 (L) 11.5 - 16.0 g/dL    HCT 27.2 (L) 35.0 - 47.0 %    MCV 97.5 80.0 - 99.0 FL    MCH 33.3 26.0 - 34.0 PG    MCHC 34.2 30.0 - 36.5 g/dL    RDW 19.0 (H) 11.5 - 14.5 %    PLATELET 94 (L) 954 - 400 K/uL    MPV 10.7 8.9 - 12.9 FL    NRBC 0.0 0  WBC    ABSOLUTE NRBC 0.00 0.00 - 0.01 K/uL    NEUTROPHILS 72 32 - 75 %    LYMPHOCYTES 26 12 - 49 %    MONOCYTES 1 (L) 5 - 13 %    EOSINOPHILS 0 0 - 7 %    BASOPHILS 0 0 - 1 %    IMMATURE GRANULOCYTES 1 (H) 0.0 - 0.5 %    ABS. NEUTROPHILS 1.3 (L) 1.8 - 8.0 K/UL    ABS. LYMPHOCYTES 0.5 (L) 0.8 - 3.5 K/UL    ABS. MONOCYTES 0.0 0.0 - 1.0 K/UL    ABS. EOSINOPHILS 0.0 0.0 - 0.4 K/UL    ABS. BASOPHILS 0.0 0.0 - 0.1 K/UL    ABS. IMM.  GRANS. 0.0 0.00 - 0.04 K/UL    DF SMEAR SCANNED      RBC COMMENTS ANISOCYTOSIS  1+        RBC COMMENTS MACROCYTOSIS  1+        RBC COMMENTS SPHEROCYTES  PRESENT       METABOLIC PANEL, COMPREHENSIVE    Collection Time: 09/23/22  3:34 AM   Result Value Ref Range    Sodium 133 (L) 136 - 145 mmol/L    Potassium 3.9 3.5 - 5.1 mmol/L    Chloride 104 97 - 108 mmol/L    CO2 24 21 - 32 mmol/L    Anion gap 5 5 - 15 mmol/L    Glucose 152 (H) 65 - 100 mg/dL    BUN 15 6 - 20 MG/DL    Creatinine 0.54 (L) 0.55 - 1.02 MG/DL    BUN/Creatinine ratio 28 (H) 12 - 20      GFR est AA >60 >60 ml/min/1.73m2    GFR est non-AA >60 >60 ml/min/1.73m2    Calcium 5.9 (LL) 8.5 - 10.1 MG/DL    Bilirubin, total 6.0 (H) 0.2 - 1.0 MG/DL    ALT (SGPT) 98 (H) 12 - 78 U/L    AST (SGOT) 350 (H) 15 - 37 U/L    Alk. phosphatase 282 (H) 45 - 117 U/L    Protein, total 4.4 (L) 6.4 - 8.2 g/dL    Albumin 2.0 (L) 3.5 - 5.0 g/dL    Globulin 2.4 2.0 - 4.0 g/dL    A-G Ratio 0.8 (L) 1.1 - 2.2     PHOSPHORUS    Collection Time: 09/23/22  3:34 AM   Result Value Ref Range    Phosphorus 1.0 (L) 2.6 - 4.7 MG/DL       Available Xrays reviewed:    Chemotherapy monitored and toxicities assessed:    Assessment and Plan   Metastatic breast cancer with progressive liver mets. .  she has been through all available hormornal therapies as well as xeloda, and picqray. Sunday 9/18 received cycle I carbo/Taxol with full dose carbo and 1/2 dose of her weekly Taxol. Day 7 on Saturday   she needs premeds given this evening  (d/w nursing) with dose adjusted for her counts and bilirubin. Hypotension. Resolved; continue 75 cc per hour NS  Awake this afternoon; out of bed a bit this am  Profound anorexia. Megace added. She is on prophylactic enoxaparin. Nutrition consult. She needs popsickles, soups, and good-tasting supplements.  to bring things in also. Anemia. .9.3; plts 94;; stool heme pos. Follow daily counts  Ascites. 3 more liters removed. Abd more flat; drain fell out; draining through fistula tract. Apply ostomy bag.   5.   Debility- PT/OT consult  6. Nausea. Juan R [place] secondary to liver mets/ascites. .. will add compazine/dex prn to Sandra Doll MD

## 2022-09-23 NOTE — PROGRESS NOTES
ARSEN received a call from Margarito ([phone]) with [employer]. She was wondering about pt's d/c. ARSEN informed her that pt will likely be d/c'd early next week. She will call the unit to get an update on Monday.  Candace Jain, KIRAN,ACM-SW

## 2022-09-24 NOTE — PROGRESS NOTES
Hematology-Oncology Progress Note    Siobhan Luciano  1960  [id number]  9/24/2022    Follow-up for: metastatic breast cancer     [x]        Chart notes since last visit reviewed   [x]        Medications reviewed for allergies and interactions       Case discussed with the following:         []                            [x]        Nursing Staff esperanza                                                                        []        Pathologist                                                                        [x]        FAMILY       Subjective:     Pt is very weak, holding taxol for neutropenia; she denies any pain,     Objective:   Patient Vitals for the past 24 hrs:   BP Temp Pulse Resp SpO2   09/24/22 0759 98/65 98.3 °F (36.8 °C) 90 18 98 %   09/24/22 0302 97/62 98.1 °F (36.7 °C) 89 18 97 %   09/23/22 1917 103/70 98.4 °F (36.9 °C) 93 17 97 %   09/23/22 1430 99/66 97.3 °F (36.3 °C) 95 16 99 %         REVIEW OF SYSTEMS:    Constitutional: negative fever, negative chills, negative weight loss  Eyes:   negative visual changes  ENT:   negative sore throat, tongue or lip swelling  Respiratory:  negative cough, negative dyspnea  Cards:  negative for chest pain, palpitations, lower extremity edema  GI:   negative for nausea, vomiting, diarrhea, and abdominal pain  Neuro:  negative for headaches, dizziness, vertigo  []                        Full ROS o/w normal/non contributor    Constitutional:  Patient looks  [x]        Sick  [x]        Frail  []        Better                                                 []        Depressed    HEENT:  []   NC                         []   AT               []    ALOPECIA           Eyes: []   Normal               [x]    Icteric  Oropharynx: []    Normal                  []  Thrush               []   Dry  Mucositis: []    None                 Grade: []        I  []        II  []        III  []        IV  Neck:   [x]   Supple                  []  Rigid JVD:    []   ABSENT       []   PRESENT  Lymphadenopathy:   []   None Noted            []   PRESENT    Chest:  [x]   Clear               []    Rhonchi                      Dec'd @     []  Right Base           []   Left Base    CV:             [x]   Regular              []  Irregular               []   Tachy                []   Murmur  Abdominal:   []    Soft              [x]   NON-tender               []   Tender    distended w ascites  BS:    []   ABSENT                   [x]   PRESENT  Liver:     []  NON-palp                  [x]   EDGE- palp  Spleen: [x]   NON-palp                   [x]  EDGE - palp   ascites. Mass:   [x]   ABSENT                          []  PRESENT  Extr:    [x]  Lymphedema             []   Cyanosis      []  Clubbing  Edema:     []   NONE       [x]   PRESENT  Skin:  Intact [x]           Purpura []        Rash: []   ABSENT       []  PRESENT  Neuro:  [x]        Normal  []        Confused      Available labs reviewed:  Labs:    Recent Results (from the past 24 hour(s))   CBC WITH AUTOMATED DIFF    Collection Time: 09/24/22  1:55 AM   Result Value Ref Range    WBC 1.2 (L) 3.6 - 11.0 K/uL    RBC 2.59 (L) 3.80 - 5.20 M/uL    HGB 8.6 (L) 11.5 - 16.0 g/dL    HCT 25.2 (L) 35.0 - 47.0 %    MCV 97.3 80.0 - 99.0 FL    MCH 33.2 26.0 - 34.0 PG    MCHC 34.1 30.0 - 36.5 g/dL    RDW 19.1 (H) 11.5 - 14.5 %    PLATELET 74 (L) 650 - 400 K/uL    MPV 11.3 8.9 - 12.9 FL    NRBC 0.0 0  WBC    ABSOLUTE NRBC 0.00 0.00 - 0.01 K/uL    NEUTROPHILS 64 32 - 75 %    BAND NEUTROPHILS 7 (H) 0 - 6 %    LYMPHOCYTES 24 12 - 49 %    MONOCYTES 4 (L) 5 - 13 %    EOSINOPHILS 0 0 - 7 %    BASOPHILS 0 0 - 1 %    METAMYELOCYTES 1 (H) 0 %    IMMATURE GRANULOCYTES 0 %    ABS. NEUTROPHILS 0.9 (L) 1.8 - 8.0 K/UL    ABS. LYMPHOCYTES 0.3 (L) 0.8 - 3.5 K/UL    ABS. MONOCYTES 0.0 0.0 - 1.0 K/UL    ABS. EOSINOPHILS 0.0 0.0 - 0.4 K/UL    ABS. BASOPHILS 0.0 0.0 - 0.1 K/UL    ABS. IMM.  GRANS. 0.0 K/UL    DF MANUAL      RBC COMMENTS ANISOCYTOSIS  1+        RBC COMMENTS MACROCYTOSIS  1+       METABOLIC PANEL, COMPREHENSIVE    Collection Time: 09/24/22  1:55 AM   Result Value Ref Range    Sodium 132 (L) 136 - 145 mmol/L    Potassium 4.2 3.5 - 5.1 mmol/L    Chloride 101 97 - 108 mmol/L    CO2 24 21 - 32 mmol/L    Anion gap 7 5 - 15 mmol/L    Glucose 211 (H) 65 - 100 mg/dL    BUN 12 6 - 20 MG/DL    Creatinine 0.46 (L) 0.55 - 1.02 MG/DL    BUN/Creatinine ratio 26 (H) 12 - 20      GFR est AA >60 >60 ml/min/1.73m2    GFR est non-AA >60 >60 ml/min/1.73m2    Calcium 5.9 (LL) 8.5 - 10.1 MG/DL    Bilirubin, total 4.9 (H) 0.2 - 1.0 MG/DL    ALT (SGPT) 97 (H) 12 - 78 U/L    AST (SGOT) 314 (H) 15 - 37 U/L    Alk. phosphatase 264 (H) 45 - 117 U/L    Protein, total 4.1 (L) 6.4 - 8.2 g/dL    Albumin 1.8 (L) 3.5 - 5.0 g/dL    Globulin 2.3 2.0 - 4.0 g/dL    A-G Ratio 0.8 (L) 1.1 - 2.2     PHOSPHORUS    Collection Time: 09/24/22  1:55 AM   Result Value Ref Range    Phosphorus 1.8 (L) 2.6 - 4.7 MG/DL       Available Xrays reviewed:    Chemotherapy monitored and toxicities assessed:    Assessment and Plan   Metastatic breast cancer with progressive liver mets. .  she has been through all available hormornal therapies as well as xeloda, and picqray. Sunday 9/18 received cycle I carbo/Taxol with full dose carbo and 1/2 dose of her weekly Taxol. Day 7 on Saturday   she needs premeds given this evening  (d/w nursing) with dose adjusted for her counts and bilirubin. Holding taxol today 9/24 for ANC of 900    Hypotension. Resolved; continue 75 cc per hour N  Profound anorexia. Megace added. She is on prophylactic enoxaparin. Nutrition consult. She needs popsickles, soups, and good-tasting supplements.  to bring things in also. Anemia. .hgb is 8.6;  plts 74 ; stool heme pos. Follow daily counts  Ascites. 3 more liters removed. Abd more flat; drain fell out; draining through fistula tract. Apply ostomy bag.   5.   Debility- PT/OT consult  6. Nausea. Christie Red  secondary to liver mets/ascites. .. will add compazine/dex prn to Lorenzo Fernandez MD

## 2022-09-24 NOTE — PROGRESS NOTES
Problem: Falls - Risk of  Goal: *Absence of Falls  Description: Document Clearence Skates Fall Risk and appropriate interventions in the flowsheet.   Outcome: Progressing Towards Goal  Note: Fall Risk Interventions:  Mobility Interventions: Patient to call before getting OOB    Mentation Interventions: Adequate sleep, hydration, pain control    Medication Interventions: Bed/chair exit alarm    Elimination Interventions: Call light in reach    History of Falls Interventions: Bed/chair exit alarm         Problem: Patient Education: Go to Patient Education Activity  Goal: Patient/Family Education  Outcome: Progressing Towards Goal     Problem: Pain  Goal: *Control of Pain  Outcome: Progressing Towards Goal  Goal: *PALLIATIVE CARE:  Alleviation of Pain  Outcome: Progressing Towards Goal     Problem: Patient Education: Go to Patient Education Activity  Goal: Patient/Family Education  Outcome: Progressing Towards Goal     Problem: General Medical Care Plan  Goal: *Vital signs within specified parameters  Outcome: Progressing Towards Goal  Goal: *Labs within defined limits  Outcome: Progressing Towards Goal  Goal: *Absence of infection signs and symptoms  Outcome: Progressing Towards Goal  Goal: *Optimal pain control at patient's stated goal  Outcome: Progressing Towards Goal  Goal: *Skin integrity maintained  Outcome: Progressing Towards Goal  Goal: *Fluid volume balance  Outcome: Progressing Towards Goal  Goal: *Optimize nutritional status  Outcome: Progressing Towards Goal  Goal: *Anxiety reduced or absent  Outcome: Progressing Towards Goal  Goal: *Progressive mobility and function (eg: ADL's)  Outcome: Progressing Towards Goal

## 2022-09-24 NOTE — PROGRESS NOTES
Holding chemo today per Dr Lucero Razo orders on Thursday - Hold Taxol if ANC <1.3 or Plts <75 - ANC today 0.9 and plts 74. Made Danial [facility] in pharmacy aware.   Will update pt and spouse

## 2022-09-25 NOTE — PROGRESS NOTES
Hematology-Oncology Progress Note    Chaim Bojorquez  1960  [id number]  9/25/2022    Follow-up for: metastatic breast cancer     [x]        Chart notes since last visit reviewed   [x]        Medications reviewed for allergies and interactions       Case discussed with the following:         []                            [x]        Nursing Staff esperanza                                                                        []        Pathologist                                                                        [x]        FAMILY       Subjective:     Pt denies pain, is very weak;   [address] is 900 today, holding chemotherapy     Objective:   Patient Vitals for the past 24 hrs:   BP Temp Pulse Resp SpO2   09/25/22 0902 112/66 97.6 °F (36.4 °C) 68 18 95 %   09/25/22 0318 -- -- -- -- 95 %   09/25/22 0254 112/67 97.4 °F (36.3 °C) 93 18 (!) 79 %   09/24/22 1917 109/78 98 °F (36.7 °C) 72 18 97 %   09/24/22 1439 (!) 90/51 97.6 °F (36.4 °C) 86 18 98 %         REVIEW OF SYSTEMS:    Constitutional: negative fever, negative chills, negative weight loss  Eyes:   negative visual changes  ENT:   negative sore throat, tongue or lip swelling  Respiratory:  negative cough, negative dyspnea  Cards:  negative for chest pain, palpitations, lower extremity edema  GI:   negative for nausea, vomiting, diarrhea, and abdominal pain  Neuro:  negative for headaches, dizziness, vertigo  []                        Full ROS o/w normal/non contributor    Constitutional:  Patient looks  [x]        Sick  [x]        Frail  []        Better                                                 []        Depressed    HEENT:  []   NC                         []   AT               []    ALOPECIA           Eyes: []   Normal               [x]    Icteric  Oropharynx: []    Normal                  []  Thrush               []   Dry  Mucositis: []    None                 Grade: []        I  []        II  []        III  []        IV  Neck:   [x] Supple                  []  Rigid               JVD:    []   ABSENT       []   PRESENT  Lymphadenopathy:   []   None Noted            []   PRESENT    Chest:  [x]   Clear               []    Rhonchi                      Dec'd @     []  Right Base           []   Left Base    CV:             [x]   Regular              []  Irregular               []   Tachy                []   Murmur  Abdominal:   []    Soft              [x]   NON-tender               []   Tender    distended w ascites  BS:    []   ABSENT                   [x]   PRESENT  Liver:     []  NON-palp                  [x]   EDGE- palp  Spleen: [x]   NON-palp                   [x]  EDGE - palp   ascites. Mass:   [x]   ABSENT                          []  PRESENT  Extr:    [x]  Lymphedema             []   Cyanosis      []  Clubbing  Edema:     []   NONE       [x]   PRESENT  Skin:  Intact [x]           Purpura []        Rash: []   ABSENT       []  PRESENT  Neuro:  [x]        Normal  []        Confused      Available labs reviewed:  Labs:    Recent Results (from the past 24 hour(s))   CBC WITH AUTOMATED DIFF    Collection Time: 09/25/22  5:34 AM   Result Value Ref Range    WBC 1.5 (L) 3.6 - 11.0 K/uL    RBC 2.52 (L) 3.80 - 5.20 M/uL    HGB 8.1 (L) 11.5 - 16.0 g/dL    HCT 24.5 (L) 35.0 - 47.0 %    MCV 97.2 80.0 - 99.0 FL    MCH 32.1 26.0 - 34.0 PG    MCHC 33.1 30.0 - 36.5 g/dL    RDW 18.9 (H) 11.5 - 14.5 %    PLATELET 62 (L) 115 - 400 K/uL    MPV 9.9 8.9 - 12.9 FL    NRBC 1.3 (H) 0  WBC    ABSOLUTE NRBC 0.02 (H) 0.00 - 0.01 K/uL    NEUTROPHILS 61 32 - 75 %    LYMPHOCYTES 26 12 - 49 %    MONOCYTES 11 5 - 13 %    EOSINOPHILS 0 0 - 7 %    BASOPHILS 1 0 - 1 %    IMMATURE GRANULOCYTES 1 (H) 0.0 - 0.5 %    ABS. NEUTROPHILS 0.9 (L) 1.8 - 8.0 K/UL    ABS. LYMPHOCYTES 0.4 (L) 0.8 - 3.5 K/UL    ABS. MONOCYTES 0.2 0.0 - 1.0 K/UL    ABS. EOSINOPHILS 0.0 0.0 - 0.4 K/UL    ABS. BASOPHILS 0.0 0.0 - 0.1 K/UL    ABS. IMM.  GRANS. 0.0 0.00 - 0.04 K/UL    DF SMEAR SCANNED RBC COMMENTS MACROCYTOSIS  1+        RBC COMMENTS ANISOCYTOSIS  1+        RBC COMMENTS POLYCHROMASIA  PRESENT       METABOLIC PANEL, COMPREHENSIVE    Collection Time: 09/25/22  5:34 AM   Result Value Ref Range    Sodium 135 (L) 136 - 145 mmol/L    Potassium 4.0 3.5 - 5.1 mmol/L    Chloride 105 97 - 108 mmol/L    CO2 24 21 - 32 mmol/L    Anion gap 6 5 - 15 mmol/L    Glucose 199 (H) 65 - 100 mg/dL    BUN 9 6 - 20 MG/DL    Creatinine 0.48 (L) 0.55 - 1.02 MG/DL    BUN/Creatinine ratio 19 12 - 20      GFR est AA >60 >60 ml/min/1.73m2    GFR est non-AA >60 >60 ml/min/1.73m2    Calcium 6.3 (LL) 8.5 - 10.1 MG/DL    Bilirubin, total 3.6 (H) 0.2 - 1.0 MG/DL    ALT (SGPT) 83 (H) 12 - 78 U/L    AST (SGOT) 222 (H) 15 - 37 U/L    Alk. phosphatase 272 (H) 45 - 117 U/L    Protein, total 3.9 (L) 6.4 - 8.2 g/dL    Albumin 1.6 (L) 3.5 - 5.0 g/dL    Globulin 2.3 2.0 - 4.0 g/dL    A-G Ratio 0.7 (L) 1.1 - 2.2     PHOSPHORUS    Collection Time: 09/25/22  5:34 AM   Result Value Ref Range    Phosphorus 1.2 (L) 2.6 - 4.7 MG/DL       Available Xrays reviewed:    Chemotherapy monitored and toxicities assessed:    Assessment and Plan   Metastatic breast cancer with progressive liver mets. .  she has been through all available hormornal therapies as well as xeloda, and picqray. Sunday 9/18 received cycle I carbo/Taxol with full dose carbo and 1/2 dose of her weekly Taxol. Day 7 on Saturday   she needs premeds given this evening  (d/w nursing) with dose adjusted for her counts and bilirubin. Holding taxol today 9/24 for ANC of 900, holding taxol 9/25 for ANC of 900, bilirubin is improving    Hypotension. Resolved; continue 75 cc per hour N  Profound anorexia. Megace added. She is on prophylactic enoxaparin. Nutrition consult. She needs popsickles, soups, and good-tasting supplements. Anemia. .hgb is 8.1;  plts 62k ; stool heme pos. Follow daily counts  Ascites. 3 more liters removed.  Abd more flat; drain fell out; draining through fistula tract. Apply ostomy bag.   5.   Debility- PT/OT consult  6. Nausea. Vearl Pippins secondary to liver mets/ascites. .. will add compazine/dex prn to zofran  7.    Hypocalcemia severe, repleted today, check daily     William Ogden MD

## 2022-09-26 NOTE — PROGRESS NOTES
Problem: Mobility Impaired (Adult and Pediatric)  Goal: *Acute Goals and Plan of Care (Insert Text)  Description: FUNCTIONAL STATUS PRIOR TO ADMISSION: Patient was independent and active without use of DME up until last week or so prior to admission with gradual decline in activity. Since admission was up ad marquez until today and fell in room. HOME SUPPORT PRIOR TO ADMISSION: The patient lived with spouse but did not require assist.    Physical Therapy Goals  Initiated 9/20/2022  1. Patient will move from supine to sit and sit to supine  and roll side to side in bed with supervision/set-up within 7 day(s). 2.  Patient will transfer from bed to chair and chair to bed with supervision/set-up using the least restrictive device within 7 day(s). 3.  Patient will perform sit to stand with supervision/set-up within 7 day(s). 4.  Patient will ambulate with supervision/set-up for 50 feet with the least restrictive device within 7 day(s). 5.  Patient will ascend/descend 12 stairs with 1 handrail(s) with minimal assistance/contact guard assist within 7 day(s). Outcome: Not Progressing Towards Goal   PHYSICAL THERAPY TREATMENT  Patient: Gregg Ayala (85 y.o. female)  Date: 9/26/2022  Diagnosis: Dehydration [E86.0] <principal problem not specified>      Precautions: Fall, Bed Alarm (chemo med precautions)  Chart, physical therapy assessment, plan of care and goals were reviewed. ASSESSMENT  Patient continues with skilled PT services and is not progressing towards goals. Patient received in bed and agreeable to therapy with light encouragement. Reviewed exercises and technique for sit to stand. With bed slightly elevated and patient with hands on thighs was able to stand 2x with minimal assistance with use of rocking  motion. 3rd attempt max assist.  Today stood at bedside with marching in place and side stepping to head of bed. She remains very weak and low endurance.   States did some exercises with a friend. Overall more interactive today and participated well. Remains too weak to manage any mobility on her own and spouse is now home with COVID. Pinky Angles Current Level of Function Impacting Discharge (mobility/balance): mod to max assist for majority of mobility    Other factors to consider for discharge: spouse with covid which makes discharge home difficult but do not feel appropriate for SNF         PLAN :  Patient continues to benefit from skilled intervention to address the above impairments. Continue treatment per established plan of care. to address goals. Recommendation for discharge: (in order for the patient to meet his/her long term goals)  Physical therapy at least 2 days/week in the home AND ensure assist and/or supervision for safety with mobility   Hospice? This discharge recommendation:  Has been made in collaboration with the attending provider and/or case management    IF patient discharges home will need the following DME: hospital bed, shower chair, and wheelchair (orders have been placed)       SUBJECTIVE:   Patient stated I am so tired.     OBJECTIVE DATA SUMMARY:   Critical Behavior:  Neurologic State: Alert  Orientation Level: Oriented X4  Cognition: Follows commands  Safety/Judgement: Decreased awareness of environment, Decreased awareness of need for assistance, Decreased awareness of need for safety, Decreased insight into deficits  Functional Mobility Training:  Bed Mobility:     Supine to Sit: Moderate assistance; Additional time  Sit to Supine: Moderate assistance  Scooting: Additional time;Minimum assistance        Transfers:  Sit to Stand: Minimum assistance (max after 2 trials)  Stand to Sit: Contact guard assistance;Minimum assistance                             Balance:  Sitting: Impaired; With support  Sitting - Static: Good (unsupported)  Sitting - Dynamic: None  Standing: Impaired; With support  Standing - Static: Constant support; Fair  Ambulation/Gait Training: Stood at bedside with brief marching in place                        Therapeutic Exercises:   Heel slides, Long arc quads, squats  Pain Ratin    Activity Tolerance:   Poor and requires frequent rest breaks    After treatment patient left in no apparent distress:   Supine in bed, Call bell within reach, Bed / chair alarm activated, and Side rails x 3    COMMUNICATION/COLLABORATION:   The patients plan of care was discussed with: Registered nurse.      Allegra Abad, PT   Time Calculation: 32 mins

## 2022-09-26 NOTE — PROGRESS NOTES
Problem: Self Care Deficits Care Plan (Adult)  Goal: *Acute Goals and Plan of Care (Insert Text)  Description: FUNCTIONAL STATUS PRIOR TO ADMISSION: Patient was independent and active without use of DME, however, per , pt progressively weaker over past 4 weeks. HOME SUPPORT: The patient lived with  but did not require assist.    Occupational Therapy Goals  Initiated 9/20/2022  1. Patient will perform grooming standing at sink with supervision/set-up within 7 day(s). 2.  Patient will perform bathing with supervision/set-up within 7 day(s). 3.  Patient will perform upper body dressing and lower body dressing with supervision/set-up within 7 day(s). 4.  Patient will perform toilet transfers with supervision/set-up within 7 day(s). 5.  Patient will perform all aspects of toileting with supervision/set-up within 7 day(s). 6.  Patient will participate in upper extremity therapeutic exercise/activities with modified independence for 10 minutes within 7 day(s). 7.  Patient will utilize energy conservation techniques during functional activities with verbal cues within 7 day(s). Outcome: Not Met    OCCUPATIONAL THERAPY TREATMENT  Patient: Sanford Oakes (51 y.o. female)  Date: 9/26/2022  Diagnosis: Dehydration [E86.0] <principal problem not specified>      Precautions: Fall, Bed Alarm (chemo med precautions)  Chart, occupational therapy assessment, plan of care, and goals were reviewed. ASSESSMENT  Patient continues with skilled OT services and is minimally progressing towards goals. Patient ADLs continue to be limited by impaired balance, generalized weakness, limited lower body access and decreased insight into deficits. Patient received in bed and agreeable to EOB session. Patient required mod A to come to sitting EOB. Patient required min A for upper body bathing and to change gown. Patient with max A to change socks. Patient SBA to comb hair.  Patient required mod A to lift backside from EOB in order to scoot laterally to [facility] in sitting. Returned to supine with mod A. Patient left in R sidelying with call bell in reach, RN aware and all needs met. Will continue to follow, continue to recommend rehab once cleared for D/C to maximize functional IND and decrease caregiver burden. If patient were to return home, she would require skilled 2 person assist for functional mobility and ADLs as well as a hospital bed and BSC. Current Level of Function Impacting Discharge (ADLs): SBA-min A for upper body, max A for lower body and mod-max A for mobility    Other factors to consider for discharge: prognosis? PLAN :  Patient continues to benefit from skilled intervention to address the above impairments. Continue treatment per established plan of care to address goals. Recommend with staff: Recommend with nursing, ADLs with supervision/setup,  OOB to chair 3x/day via rolling walker and toileting via beside commode with 2 person assistance. Thank you for completing as able in order to maintain patient strength, endurance and independence. Recommend next OT session: toileting on [facility]    Recommendation for discharge: (in order for the patient to meet his/her long term goals)  Therapy up to 5 days/week in SNF setting    This discharge recommendation:  Has been made in collaboration with the attending provider and/or case management    IF patient discharges home will need the following DME: bedside commode and hospital bed       SUBJECTIVE:   Patient stated Ravi Russell can usually do it but I'm weaker than usual.    OBJECTIVE DATA SUMMARY:   Cognitive/Behavioral Status:  Neurologic State: Alert  Orientation Level: Oriented X4  Cognition: Follows commands  Perception: Appears intact  Perseveration: No perseveration noted  Safety/Judgement: Decreased awareness of need for assistance;Decreased awareness of need for safety;Decreased insight into deficits; Fall prevention    Functional Mobility and Transfers for ADLs:  Bed Mobility:  Supine to Sit: Moderate assistance; Additional time  Sit to Supine: Moderate assistance; Additional time  Scooting: Moderate assistance; Additional time (in sitting to scoot to [facility])    Transfers:  Sit to Stand: Minimum assistance (max after 2 trials)    Balance:  Sitting: Impaired  Sitting - Static: Good (unsupported)  Sitting - Dynamic: Fair (occasional)  Standing: Impaired; With support  Standing - Static: Constant support; Fair    ADL Intervention:    Upper Body Bathing  Bathing Assistance: Minimum assistance  Position Performed: Seated edge of bed  Cues: Physical assistance;Verbal cues provided    Upper Body [address]: Moderate assistance  Cues: Physical assistance;Verbal cues provided    Lower Body Dressing Assistance  Socks: Maximum assistance  Leg Crossed Method Used: No  Position Performed: Seated edge of bed  Cues: Don;Physical assistance;Verbal cues provided    Cognitive Retraining  Safety/Judgement: Decreased awareness of need for assistance;Decreased awareness of need for safety;Decreased insight into deficits; Fall prevention    Pain:  Reporting no pain    Activity Tolerance:   Fair    After treatment patient left in no apparent distress:   Supine in bed, Call bell within reach, Bed / chair alarm activated, Side rails x 3, and RN aware    COMMUNICATION/COLLABORATION:   The patients plan of care was discussed with: Physical therapist and Registered nurse.      Mellisa Nelson OT  Time Calculation: 23 mins

## 2022-09-26 NOTE — PROGRESS NOTES
Comprehensive Nutrition Assessment    Type and Reason for Visit: Reassess    Nutrition Recommendations/Plan:   Continue regular diet, though pt is not eating  Modified ons- magic cup once daily    Recommend continuing with megace, however megace can take weeks to increase appetite. Recommend initiation of tube feeds during this time. Pt is severely malnourished, has severe taste changes making food unappetizing, and is unable to meet protein/calorie needs orally. NGT vs. PEG pending pt goals of care? NGT may be more appropriate as megace will hopefully increase appetite. For either, recommend Nocturnal Jevity 1.5 @ 100 ml/hr x 12 hours (8p-8a) + 60 ml water flush q4h    Recommend adding thiamine 100 mg x 10 days to lessen risk of refeeding  Monitor Mg, Phos, K daily for refeeding risk  Phos 1.0 today and trending down- recommend repletion  Please obtain updated weight       Malnutrition Assessment:  Malnutrition Status:  Severe malnutrition (09/22/22 1502)    Context:  Chronic illness     Findings of the 6 clinical characteristics of malnutrition:   Energy Intake:  75% or less est energy requirements for 1 month or longer  Weight Loss:  Greater than 5% over 1 month     Body Fat Loss:  Mild body fat loss, Orbital, Buccal region   Muscle Mass Loss:  Severe muscle mass loss, Clavicles (pectoralis &deltoids), Temples (temporalis), Hand (interosseous)  Fluid Accumulation:  Severe, Ascites   Strength:  Not performed        Nutrition Assessment:    Past Medical History:   Diagnosis Date    Breast cancer ([facility] [address]     breast, mets to bone    Colitis     Hypertension     Hypothyroidism     Multiple thyroid nodules      9/26: Follow up. Visited pt at bedside where she stated her appetite has not improved at all. She has not been drinking ensure clear or eating magic cup supplements- they are too sweet. She'll take \"a few bites\" of a magic cup daily- will continue to send once/day. She'd like to d/c ensure clear.  She stated her friends have been bringing in [facility] I want\"- stated they brought her soup today but did not expand further. I ordered her white bread with butter during interview. Pt stated she is open to starting a TF as it is obvious she is unable to meet her needs via po. Phos 1.0 and trending down. Na 133.     9/22: Consult appreciated. 63 y.o. female admitted with metastatic breast cancer diagnosed 2018 with progressive liver mets currently on chemotherapy. Pt has ascites- has had 3L of fluid removed. Visited pt at bedside,  Meet Olson present. Pt/ stated that she's been unable to eat more than 2-3 bites of any food at a time. She's lost 7-9# over the last 1-3 months which is significant. Her sense of taste is altered d/t chemo and most foods taste very salty or bad. She was unable to provide me with any food choices that sound appealing. She doesn't like regular ensure but was unable to say why. She's open to ensure clear and magic cup to assist with kcal/pro intake, though doubtful she'll be able to eat everything I've ordered. She denied v/c/d today, was given zofran this morning for nausea. Noted that pt was started on megace this admission which will hopefully help but can take weeks to take effect on appetite. Given severity of pts malnutrition, recommend starting TF. We talked about doing a nocturnal feed so pt can still eat during the day. PEG vs. NGT pending pt goals? NGT may be more appropriate as megace will hopefully increase appetite. Recommendation if needed: Nocturnal Jevity 1.5 @ 100 ml/hr x 12 hours (8p-8a) + 60 ml water flush q4h. This will provide 1800 kcals (100% of needs), 77 g pro (92% of needs), 1152 ml free water (TF+flush), 259 g CHO. This recommendation will work for NGT or PEG. Labs: phos 1.7, Mg 2.7. Recommend thiamine 100mg x 10 days to prevent refeeding if feeds are initiated.      Recent Labs     09/26/22  0146 09/25/22  0534 09/24/22  0155   * 199* 211* BUN 12 9 12   CREA 0.48* 0.48* 0.46*   * 135* 132*   K 3.9 4.0 4.2    105 101   CO2 25 24 24   CA 6.6* 6.3* 5.9*   PHOS 1.0* 1.2* 1.8*         Nutritionally Significant Medications:  Synthroid, megace, protonix, IV NaCl, zofran prn      Estimated Daily Nutrient Needs:  Energy Requirements Based On: Kcal/kg  Weight Used for Energy Requirements: Current  Energy (kcal/day): 2036-8395 (30-35 kcal/kg)  Weight Used for Protein Requirements: Current  Protein (g/day):  g (1.5-2 g/kg)  Method Used for Fluid Requirements: 1 ml/kcal  Fluid (ml/day): 1800    Nutrition Related Findings:   Edema: ascites  Last BM: 09/19/22 (per pt), Loose    Wounds: None      Current Nutrition Therapies:  Diet: regular  Supplements: none  Meal intake: Patient Vitals for the past 168 hrs:   % Diet Eaten   09/22/22 0946 1 - 25%     Supplement intake: No data found. Nutrition Support: none      Anthropometric Measures:  Height: 5' 3\" (160 cm)  Ideal Body Weight (IBW): 115 lbs (52 kg)     Current Body Wt:  56.2 kg (123 lb 14.4 oz), 107.7 % IBW.  Standing scale  Current BMI (kg/m2): 22  Usual Body Weight: 59.9 kg (132 lb)  % Weight Change (Calculated): -6.1  Weight Adjustment: No adjustment        Wt Readings from Last 10 Encounters:   09/16/22 56.2 kg (123 lb 14.4 oz)   08/08/22 59 kg (130 lb)   06/28/22 59.9 kg (132 lb)   04/12/21 53.1 kg (117 lb)   10/28/19 66.5 kg (146 lb 9.7 oz)   05/22/19 68 kg (150 lb)   08/06/18 65.8 kg (145 lb)   08/06/18 63.5 kg (140 lb)           Nutrition Diagnosis:   Severe malnutrition, In context of chronic illness related to catabolic illness as evidenced by Criteria as identified in malnutrition assessment  Inadequate oral intake related to altered taste perception as evidenced by weight loss greater than or equal to 5% in 1 month, intake 0-25%    Nutrition Interventions:   Food and/or Nutrient Delivery: Continue current diet, Modify oral nutrition supplement  Nutrition Education/Counseling: Education not indicated  Coordination of Nutrition Care: Continue to monitor while inpatient, Coordination of care       Goals:  Previous Goal Met: No progress toward goal(s)  Goals: Initiate nutrition support, by next RD assessment       Nutrition Monitoring and Evaluation:   Behavioral-Environmental Outcomes: None identified  Food/Nutrient Intake Outcomes: Food and nutrient intake, Supplement intake, Enteral nutrition intake/tolerance  Physical Signs/Symptoms Outcomes: Biochemical data, Meal time behavior, Nutrition focused physical findings, Weight, GI status    Discharge Planning:     Too soon to determine    John Britton RD  Available via [address] [place] [address]

## 2022-09-26 NOTE — PROGRESS NOTES
Hematology-Oncology Progress Note    Susan Lake  1960  [id number]  9/26/2022    Follow-up for: metastatic breast cancer     [x]        Chart notes since last visit reviewed   [x]        Medications reviewed for allergies and interactions       Case discussed with the following:         []                            [x]        Nursing Staff esperanza                                                                        []        Pathologist                                                                        []        FAMILY       Subjective:     Pt denies pain, is very weak;   [address] is 800 today, not able to sit in chair or walk due to deconditioning    Objective:   Patient Vitals for the past 24 hrs:   BP Temp Pulse Resp SpO2   09/26/22 0918 103/67 97.8 °F (36.6 °C) 70 18 96 %   09/26/22 0141 (!) 112/57 97.8 °F (36.6 °C) 70 18 96 %   09/25/22 1954 109/65 98.6 °F (37 °C) 74 19 97 %   09/25/22 1428 117/68 97.9 °F (36.6 °C) 70 18 97 %         REVIEW OF SYSTEMS:    Constitutional: negative fever, negative chills, negative weight loss  Eyes:   negative visual changes  ENT:   negative sore throat, tongue or lip swelling  Respiratory:  negative cough, negative dyspnea  Cards:  negative for chest pain, palpitations, lower extremity edema  GI:   negative for nausea, vomiting, diarrhea, and abdominal pain  Neuro:  negative for headaches, dizziness, vertigo  []                        Full ROS o/w normal/non contributor    Constitutional:  Patient looks  [x]        Sick  [x]        Frail  []        Better                                                 []        Depressed    HEENT:  []   NC                         []   AT               []    ALOPECIA           Eyes: []   Normal               [x]    Icteric  Oropharynx: []    Normal                  []  Thrush               []   Dry  Mucositis: []    None                 Grade: []        I  []        II  []        III  []        IV  Neck:   [x]   Supple []  Rigid               JVD:    []   ABSENT       []   PRESENT  Lymphadenopathy:   []   None Noted            []   PRESENT    Chest:  [x]   Clear               []    Rhonchi                      Dec'd @     []  Right Base           []   Left Base    CV:             [x]   Regular              []  Irregular               []   Tachy                []   Murmur  Abdominal:   []    Soft              [x]   NON-tender               []   Tender    distended w ascites  BS:    []   ABSENT                   [x]   PRESENT  Liver:     []  NON-palp                  [x]   EDGE- palp  Spleen: [x]   NON-palp                   [x]  EDGE - palp   ascites. Mass:   [x]   ABSENT                          []  PRESENT  Extr:    [x]  Lymphedema             []   Cyanosis      []  Clubbing  Edema:     []   NONE       [x]   PRESENT  Skin:  Intact [x]           Purpura []        Rash: []   ABSENT       []  PRESENT  Neuro:  [x]        Normal  []        Confused      Available labs reviewed:  Labs:    Recent Results (from the past 24 hour(s))   CBC WITH AUTOMATED DIFF    Collection Time: 09/26/22  1:46 AM   Result Value Ref Range    WBC 1.6 (L) 3.6 - 11.0 K/uL    RBC 2.56 (L) 3.80 - 5.20 M/uL    HGB 8.3 (L) 11.5 - 16.0 g/dL    HCT 25.1 (L) 35.0 - 47.0 %    MCV 98.0 80.0 - 99.0 FL    MCH 32.4 26.0 - 34.0 PG    MCHC 33.1 30.0 - 36.5 g/dL    RDW 19.1 (H) 11.5 - 14.5 %    PLATELET 75 (L) 789 - 400 K/uL    MPV 10.1 8.9 - 12.9 FL    NRBC 3.2 (H) 0  WBC    ABSOLUTE NRBC 0.05 (H) 0.00 - 0.01 K/uL    NEUTROPHILS 49 32 - 75 %    LYMPHOCYTES 35 12 - 49 %    MONOCYTES 16 (H) 5 - 13 %    EOSINOPHILS 0 0 - 7 %    BASOPHILS 0 0 - 1 %    IMMATURE GRANULOCYTES 0 %    ABS. NEUTROPHILS 0.7 (L) 1.8 - 8.0 K/UL    ABS. LYMPHOCYTES 0.6 (L) 0.8 - 3.5 K/UL    ABS. MONOCYTES 0.3 0.0 - 1.0 K/UL    ABS. EOSINOPHILS 0.0 0.0 - 0.4 K/UL    ABS. BASOPHILS 0.0 0.0 - 0.1 K/UL    ABS. IMM.  GRANS. 0.0 K/UL    DF MANUAL      RBC COMMENTS ANISOCYTOSIS  1+ RBC COMMENTS MACROCYTOSIS  1+       METABOLIC PANEL, COMPREHENSIVE    Collection Time: 09/26/22  1:46 AM   Result Value Ref Range    Sodium 133 (L) 136 - 145 mmol/L    Potassium 3.9 3.5 - 5.1 mmol/L    Chloride 103 97 - 108 mmol/L    CO2 25 21 - 32 mmol/L    Anion gap 5 5 - 15 mmol/L    Glucose 168 (H) 65 - 100 mg/dL    BUN 12 6 - 20 MG/DL    Creatinine 0.48 (L) 0.55 - 1.02 MG/DL    BUN/Creatinine ratio 25 (H) 12 - 20      GFR est AA >60 >60 ml/min/1.73m2    GFR est non-AA >60 >60 ml/min/1.73m2    Calcium 6.6 (L) 8.5 - 10.1 MG/DL    Bilirubin, total 3.4 (H) 0.2 - 1.0 MG/DL    ALT (SGPT) 89 (H) 12 - 78 U/L    AST (SGOT) 198 (H) 15 - 37 U/L    Alk. phosphatase 393 (H) 45 - 117 U/L    Protein, total 4.5 (L) 6.4 - 8.2 g/dL    Albumin 1.7 (L) 3.5 - 5.0 g/dL    Globulin 2.8 2.0 - 4.0 g/dL    A-G Ratio 0.6 (L) 1.1 - 2.2     PHOSPHORUS    Collection Time: 09/26/22  1:46 AM   Result Value Ref Range    Phosphorus 1.0 (L) 2.6 - 4.7 MG/DL       Available Xrays reviewed:    Chemotherapy monitored and toxicities assessed:    Assessment and Plan   Metastatic breast cancer with progressive liver mets. .  she has been through all available hormornal therapies as well as xeloda, and picqray. Sunday 9/18 received cycle I carbo/Taxol with full dose carbo and 1/2 dose of her weekly Taxol. Day 7 on Saturday   holding day 8  due to neutropenia    Hypotension. Resolved; continue 75 cc per hour N  Profound anorexia. Megace added. She is on prophylactic enoxaparin. Nutrition consult. She needs popsickles, soups, and good-tasting supplements. Anemia. .hgb is 8.3;  plts 75k ; stool heme pos. Follow daily counts  Ascites. 3 more liters removed. On Saturday. following  5. Debility- PT/OT consult  unable to participate  6. Nausea. Erleen Horta secondary to liver mets/ascites. .. will add compazine/dex prn to zofran  improved today. 7.   Hypocalcemia severe, replete again today, check daily      sick at home with covid. . call in to discuss pt situation    Rohan Mcbride MD

## 2022-09-27 NOTE — PROGRESS NOTES
Comprehensive Nutrition Assessment    Type and Reason for Visit: Reassess    Nutrition Recommendations/Plan:   Continue regular diet, though pt is not eating  Modified ons- magic cup once daily    Recommend continuing with megace, however megace can take weeks to increase appetite. Recommend initiation of tube feeds during this time. Pt is severely malnourished, has severe taste changes making food unappetizing, and is unable to meet protein/calorie needs orally. NGT vs. PEG pending pt goals of care? NGT may be more appropriate as megace will hopefully increase appetite. For either, recommend Nocturnal Jevity 1.5 @ 100 ml/hr x 12 hours (8p-8a) + 60 ml water flush q4h    Recommend adding thiamine 100 mg x 10 days to lessen risk of refeeding  Monitor Mg, Phos, K daily for refeeding risk  Phos 1.0 today and trending down- recommend repletion  Please obtain updated weight       Malnutrition Assessment:  Malnutrition Status:  Severe malnutrition (09/22/22 1502)    Context:  Chronic illness     Findings of the 6 clinical characteristics of malnutrition:   Energy Intake:  75% or less est energy requirements for 1 month or longer  Weight Loss:  Greater than 5% over 1 month     Body Fat Loss:  Mild body fat loss, Orbital, Buccal region   Muscle Mass Loss:  Severe muscle mass loss, Clavicles (pectoralis &deltoids), Temples (temporalis), Hand (interosseous)  Fluid Accumulation:  Severe, Ascites   Strength:  Not performed        Nutrition Assessment:    Past Medical History:   Diagnosis Date    Breast cancer ([facility] [address]     breast, mets to bone    Colitis     Hypertension     Hypothyroidism     Multiple thyroid nodules      9/27: Follow up. Per RN pt had \"a few bites\" of mac and cheese today that a friend brought in. Anorexia persists. Attempted to call [facility] x2 to speak with attending physician regarding initiation of TF- left VM. Phos 1.2, Na 134.      Addendum: Got a call back- spoke with attending MD's RN, Drew Bartlett, who stated that attending MD is Dr. Yan Henry, and she messaged him regarding initiation of TF.     9/26: Follow up. Visited pt at bedside where she stated her appetite has not improved at all. She has not been drinking ensure clear or eating magic cup supplements- they are too sweet. She'll take \"a few bites\" of a magic cup daily- will continue to send once/day. She'd like to d/c ensure clear. She stated her friends have been bringing in [place] I want\"- stated they brought her soup today but did not expand further. I ordered her white bread with butter during interview. Pt stated she is open to starting a TF as it is obvious she is unable to meet her needs via po. Phos 1.0 and trending down. Na 133.     9/22: Consult appreciated. 63 y.o. female admitted with metastatic breast cancer diagnosed 2018 with progressive liver mets currently on chemotherapy. Pt has ascites- has had 3L of fluid removed. Visited pt at bedside,  Malu Arango present. Pt/ stated that she's been unable to eat more than 2-3 bites of any food at a time. She's lost 7-9# over the last 1-3 months which is significant. Her sense of taste is altered d/t chemo and most foods taste very salty or bad. She was unable to provide me with any food choices that sound appealing. She doesn't like regular ensure but was unable to say why. She's open to ensure clear and magic cup to assist with kcal/pro intake, though doubtful she'll be able to eat everything I've ordered. She denied v/c/d today, was given zofran this morning for nausea. Noted that pt was started on megace this admission which will hopefully help but can take weeks to take effect on appetite. Given severity of pts malnutrition, recommend starting TF. We talked about doing a nocturnal feed so pt can still eat during the day. PEG vs. NGT pending pt goals? NGT may be more appropriate as megace will hopefully increase appetite.      Recommendation if needed: Nocturnal Jevity 1.5 @ 100 ml/hr x 12 hours (8p-8a) + 60 ml water flush q4h. This will provide 1800 kcals (100% of needs), 77 g pro (92% of needs), 1152 ml free water (TF+flush), 259 g CHO. This recommendation will work for NGT or PEG. Labs: phos 1.7, Mg 2.7. Recommend thiamine 100mg x 10 days to prevent refeeding if feeds are initiated. Recent Labs     09/27/22  0310 09/26/22  0146 09/25/22  0534   * 168* 199*   BUN 10 12 9   CREA 0.46* 0.48* 0.48*   * 133* 135*   K 4.0 3.9 4.0    103 105   CO2 23 25 24   CA 6.3* 6.6* 6.3*   PHOS 1.2* 1.0* 1.2*           Nutritionally Significant Medications:  Synthroid, megace, protonix, IV NaCl, zofran prn      Estimated Daily Nutrient Needs:  Energy Requirements Based On: Kcal/kg  Weight Used for Energy Requirements: Current  Energy (kcal/day): 2771-1081 (30-35 kcal/kg)  Weight Used for Protein Requirements: Current  Protein (g/day):  g (1.5-2 g/kg)  Method Used for Fluid Requirements: 1 ml/kcal  Fluid (ml/day): 1800    Nutrition Related Findings:   Edema: ascites  Last BM: 09/26/22, Formed    Wounds: None      Current Nutrition Therapies:  Diet: regular  Supplements: none  Meal intake: Patient Vitals for the past 168 hrs:   % Diet Eaten   09/22/22 0946 1 - 25%       Supplement intake: No data found. Nutrition Support: none      Anthropometric Measures:  Height: 5' 3\" (160 cm)  Ideal Body Weight (IBW): 115 lbs (52 kg)     Current Body Wt:  56.2 kg (123 lb 14.4 oz), 107.7 % IBW.  Standing scale  Current BMI (kg/m2): 22  Usual Body Weight: 59.9 kg (132 lb)  % Weight Change (Calculated): -6.1  Weight Adjustment: No adjustment        Wt Readings from Last 10 Encounters:   09/16/22 56.2 kg (123 lb 14.4 oz)   08/08/22 59 kg (130 lb)   06/28/22 59.9 kg (132 lb)   04/12/21 53.1 kg (117 lb)   10/28/19 66.5 kg (146 lb 9.7 oz)   05/22/19 68 kg (150 lb)   08/06/18 65.8 kg (145 lb)   08/06/18 63.5 kg (140 lb)           Nutrition Diagnosis:   Severe malnutrition, In context of chronic illness related to catabolic illness as evidenced by Criteria as identified in malnutrition assessment  Inadequate oral intake related to altered taste perception as evidenced by weight loss greater than or equal to 5% in 1 month, intake 0-25%    Nutrition Interventions:   Food and/or Nutrient Delivery: Continue current diet, Modify oral nutrition supplement  Nutrition Education/Counseling: Education not indicated  Coordination of Nutrition Care: Continue to monitor while inpatient, Coordination of care       Goals:  Previous Goal Met: No progress toward goal(s)  Goals: Initiate nutrition support, by next RD assessment       Nutrition Monitoring and Evaluation:   Behavioral-Environmental Outcomes: None identified  Food/Nutrient Intake Outcomes: Food and nutrient intake, Supplement intake, Enteral nutrition intake/tolerance  Physical Signs/Symptoms Outcomes: Biochemical data, Meal time behavior, Nutrition focused physical findings, Weight, GI status    Discharge Planning:     Too soon to determine    Yadi Sanon RD  Available via [address] [place] [address]

## 2022-09-27 NOTE — PROGRESS NOTES
Hematology-Oncology Progress Note    Anders Peabody  1960  [id number]  9/27/2022    Follow-up for: metastatic breast cancer     [x]        Chart notes since last visit reviewed   [x]        Medications reviewed for allergies and interactions       Case discussed with the following:         []                            []        Nursing Staff esperanza                                                                        []        Pathologist                                                                        []        FAMILY       Subjective:     Pt denies pain, is very weak;   [address] is 800 today, was able to sit in chair briefly yesterday    Objective:   Patient Vitals for the past 24 hrs:   BP Temp Pulse Resp SpO2   09/27/22 0842 122/60 97.9 °F (36.6 °C) 71 18 98 %   09/27/22 0259 117/71 97.8 °F (36.6 °C) 72 18 95 %   09/26/22 2217 -- -- -- -- 96 %   09/26/22 1916 121/80 97.5 °F (36.4 °C) 84 18 96 %   09/26/22 1425 119/75 97.5 °F (36.4 °C) 84 18 96 %         REVIEW OF SYSTEMS:    Constitutional: negative fever, negative chills, negative weight loss  Eyes:   negative visual changes  ENT:   negative sore throat, tongue or lip swelling  Respiratory:  negative cough, negative dyspnea  Cards:  negative for chest pain, palpitations, lower extremity edema  GI:   negative for nausea, vomiting, diarrhea, and abdominal pain  Neuro:  negative for headaches, dizziness, vertigo  []                        Full ROS o/w normal/non contributor    Constitutional:  Patient looks  [x]        Sick  [x]        Frail  []        Better                                                 []        Depressed    HEENT:  []   NC                         []   AT               []    ALOPECIA           Eyes: []   Normal               [x]    Icteric  Oropharynx: []    Normal                  []  Thrush               []   Dry  Mucositis: []    None                 Grade: []        I  []        II  []        III  []        IV  Neck:   [x] Supple                  []  Rigid               JVD:    []   ABSENT       []   PRESENT  Lymphadenopathy:   []   None Noted            []   PRESENT    Chest:  [x]   Clear               []    Rhonchi                      Dec'd @     []  Right Base           []   Left Base    CV:             [x]   Regular              []  Irregular               []   Tachy                []   Murmur  Abdominal:   []    Soft              [x]   NON-tender               []   Tender    distended w ascites  BS:    []   ABSENT                   [x]   PRESENT  Liver:     []  NON-palp                  [x]   EDGE- palp  Spleen: [x]   NON-palp                   [x]  EDGE - palp   ascites. Mass:   [x]   ABSENT                          []  PRESENT  Extr:    [x]  Lymphedema             []   Cyanosis      []  Clubbing  Edema:     []   NONE       [x]   PRESENT  Skin:  Intact [x]           Purpura []        Rash: []   ABSENT       []  PRESENT  Neuro:  [x]        Normal  []        Confused      Available labs reviewed:  Labs:    Recent Results (from the past 24 hour(s))   CBC WITH AUTOMATED DIFF    Collection Time: 09/27/22  3:10 AM   Result Value Ref Range    WBC 1.7 (L) 3.6 - 11.0 K/uL    RBC 2.48 (L) 3.80 - 5.20 M/uL    HGB 8.1 (L) 11.5 - 16.0 g/dL    HCT 24.6 (L) 35.0 - 47.0 %    MCV 99.2 (H) 80.0 - 99.0 FL    MCH 32.7 26.0 - 34.0 PG    MCHC 32.9 30.0 - 36.5 g/dL    RDW 19.3 (H) 11.5 - 14.5 %    PLATELET 72 (L) 629 - 400 K/uL    MPV 11.4 8.9 - 12.9 FL    NRBC 5.9 (H) 0  WBC    ABSOLUTE NRBC 0.10 (H) 0.00 - 0.01 K/uL    NEUTROPHILS 36 32 - 75 %    BAND NEUTROPHILS 6 0 - 6 %    LYMPHOCYTES 37 12 - 49 %    MONOCYTES 18 (H) 5 - 13 %    EOSINOPHILS 0 0 - 7 %    BASOPHILS 1 0 - 1 %    METAMYELOCYTES 1 (H) 0 %    MYELOCYTES 1 (H) 0 %    IMMATURE GRANULOCYTES 0 %    ABS. NEUTROPHILS 0.7 (L) 1.8 - 8.0 K/UL    ABS. LYMPHOCYTES 0.6 (L) 0.8 - 3.5 K/UL    ABS. MONOCYTES 0.3 0.0 - 1.0 K/UL    ABS. EOSINOPHILS 0.0 0.0 - 0.4 K/UL    ABS.  BASOPHILS 0.0 0.0 - 0.1 K/UL    ABS. IMM. GRANS. 0.0 K/UL    DF MANUAL      RBC COMMENTS ANISOCYTOSIS  1+        RBC COMMENTS MACROCYTOSIS  1+        RBC COMMENTS        ATYPICAL LYMPHOCYTES PRESENT  POLYCHROMASIA  PRESENT     METABOLIC PANEL, COMPREHENSIVE    Collection Time: 09/27/22  3:10 AM   Result Value Ref Range    Sodium 134 (L) 136 - 145 mmol/L    Potassium 4.0 3.5 - 5.1 mmol/L    Chloride 104 97 - 108 mmol/L    CO2 23 21 - 32 mmol/L    Anion gap 7 5 - 15 mmol/L    Glucose 191 (H) 65 - 100 mg/dL    BUN 10 6 - 20 MG/DL    Creatinine 0.46 (L) 0.55 - 1.02 MG/DL    BUN/Creatinine ratio 22 (H) 12 - 20      GFR est AA >60 >60 ml/min/1.73m2    GFR est non-AA >60 >60 ml/min/1.73m2    Calcium 6.3 (LL) 8.5 - 10.1 MG/DL    Bilirubin, total 3.1 (H) 0.2 - 1.0 MG/DL    ALT (SGPT) 86 (H) 12 - 78 U/L    AST (SGOT) 173 (H) 15 - 37 U/L    Alk. phosphatase 481 (H) 45 - 117 U/L    Protein, total 4.1 (L) 6.4 - 8.2 g/dL    Albumin 1.6 (L) 3.5 - 5.0 g/dL    Globulin 2.5 2.0 - 4.0 g/dL    A-G Ratio 0.6 (L) 1.1 - 2.2     PHOSPHORUS    Collection Time: 09/27/22  3:10 AM   Result Value Ref Range    Phosphorus 1.2 (L) 2.6 - 4.7 MG/DL       Available Xrays reviewed:    Chemotherapy monitored and toxicities assessed:    Assessment and Plan   Metastatic breast cancer with progressive liver mets. .  she has been through all available hormornal therapies as well as xeloda, and picqray. Sunday 9/18 received cycle I carbo/Taxol with full dose carbo and 1/2 dose of her weekly Taxol. Day 7 was due on 9/23 but we are holding day 8  due to neutropenia    Hypotension. Resolved; continue 75 cc per hour N  Profound anorexia. Megace added. She is on prophylactic enoxaparin. Nutrition consult. She needs popsickles, soups, and good-tasting supplements. Anemia. .hgb is 8.1  plts 72k ; stool heme pos. Follow daily counts  Ascites. 3 more liters removed. On Saturday. following  5. Debility- PT/OT consult  unable to participate  6. Nausea. Sherryle Moder  secondary to liver mets/ascites. .. will add compazine/dex prn to zofran  improved today. 7.   Hypocalcemia severe, replete again today, check daily      sick at home with covid. . called yesterday to review situation    Deepa Leo MD

## 2022-09-27 NOTE — PROGRESS NOTES
Problem: Self Care Deficits Care Plan (Adult)  Goal: *Acute Goals and Plan of Care (Insert Text)  Description: FUNCTIONAL STATUS PRIOR TO ADMISSION: Patient was independent and active without use of DME, however, per , pt progressively weaker over past 4 weeks. HOME SUPPORT: The patient lived with  but did not require assist.    Occupational Therapy Goals  Initiated 9/20/2022. 9/27/2022 Weekly [id number] updated below  1. Patient will perform grooming standing at sink with supervision/set-up within 7 day(s). Continue   2. Patient will perform bathing with supervision/set-up within 7 day(s). Downgrade to [address]   3. Patient will perform upper body dressing and lower body dressing with supervision/set-up within 7 day(s). Downgrade to [address]   4. Patient will perform toilet transfers with supervision/set-up within 7 day(s). Downgrade to [address]   5. Patient will perform all aspects of toileting with supervision/set-up within 7 day(s). Downgrade to [address]   6. Patient will participate in upper extremity therapeutic exercise/activities with modified independence for 10 minutes within 7 day(s). Continue   7. Patient will utilize energy conservation techniques during functional activities with verbal cues within 7 day(s). Continue     9/27/2022 1413 by Mary Aparicio OT  Outcome: Not Progressing Towards Goal     OCCUPATIONAL THERAPY TREATMENT/WEEKLY RE-ASSESSMENT  Patient: Tabatha Hall (63 y.o. female)  Date: 9/27/2022  Diagnosis: Dehydration [E86.0] <principal problem not specified>      Precautions: Fall, Bed Alarm (chemo med precautions)  Chart, occupational therapy assessment, plan of care, and goals were reviewed. ASSESSMENT  Patient continues with skilled OT services and is not progressing towards goals. Weekly re-assessment completed and goals downgraded. Pt cleared for therapy by nursing and received supine in bed agreeable to therapy.  Pt with increased alertness and conversational this session. Completed bed mobility to sit EOB (Mod A) and attempted sit>stand. Pt unable to complete sit>stand reporting she felt too weak from recently getting out of bed to [facility]. Completed grooming tasks at EOB with set up. Pt participated in BUE AROM exercises before returning to supine with Mod A. Discharge complicated due to  now with Covid. If pt returns home will need 24/7 assistance for ADLs and functional mobility. Current Level of Function Impacting Discharge (ADLs): Mod A for bed mobility, Set up -Min A for UB ADLs, Max A for LB ADLs    Other factors to consider for discharge: Independent at baseline,  Covid+          PLAN :  Goals have been updated based on progression since last assessment. Patient continues to benefit from skilled intervention to address the above impairments. Continue to follow patient 5 times a week to address goals. Recommend with staff: OOB to [facility] with Ax1, Pt participation in self care     Recommendation for discharge: (in order for the patient to meet his/her long term goals)  Occupational therapy at least 2 days/week in the home AND ensure assist and/or supervision for safety with ADLs and mobility    This discharge recommendation:  Has been made in collaboration with the attending provider and/or case management    IF patient discharges home will need the following DME: tbd       SUBJECTIVE:   Patient stated I feel more energetic today.     OBJECTIVE DATA SUMMARY:   Cognitive/Behavioral Status:  Neurologic State: Alert  Orientation Level: Oriented X4  Cognition: Follows commands  Perception: Appears intact  Perseveration: No perseveration noted  Safety/Judgement: Awareness of environment    Functional Mobility and Transfers for ADLs:  Bed Mobility:  Supine to Sit: Moderate assistance  Sit to Supine:  Moderate assistance  Scooting: Maximum assistance    Balance:  Sitting: Impaired  Sitting - Static: Good (unsupported)  Sitting - Dynamic: Fair (occasional)  Standing:  (unable to stand this session)    ADL Intervention:       Grooming  Position Performed: Seated edge of bed  Brushing Teeth: Set-up         Type of Bath: Chlorhexidine (CHG) (line cleansed with CHG)                        Cognitive Retraining  Safety/Judgement: Awareness of environment    Therapeutic Exercises:   AROM BUE (fingers, wrist, elbows, shoulders, neck)     Pain:  At shoulders with AROM     Activity Tolerance:   Fair    After treatment patient left in no apparent distress:   Supine in bed, Call bell within reach, Bed / chair alarm activated, and Side rails x 3    COMMUNICATION/COLLABORATION:   The patients plan of care was discussed with: Physical therapist and Registered nurse.      Leigh Ann Moncada OT  Time Calculation: 24 mins

## 2022-09-27 NOTE — PROGRESS NOTES
Pt is laying in bed with even and unlabored respirations on room air. No complaints of pain at this time. No distress noted. Physical therapy and occupational therapy worked with the pt during the shift. Safety precautions are in place. Bed in low position and locked. Call bell within reach. Problem: Falls - Risk of  Goal: *Absence of Falls  Description: Document Keaton Mckenzie Fall Risk and appropriate interventions in the flowsheet.   Outcome: Progressing Towards Goal  Note: Fall Risk Interventions:  Mobility Interventions: Bed/chair exit alarm, Communicate number of staff needed for ambulation/transfer, Patient to call before getting OOB    Mentation Interventions: Adequate sleep, hydration, pain control, Bed/chair exit alarm, Evaluate medications/consider consulting pharmacy    Medication Interventions: Bed/chair exit alarm, Evaluate medications/consider consulting pharmacy, Patient to call before getting OOB    Elimination Interventions: Bed/chair exit alarm, Call light in reach, Patient to call for help with toileting needs, Toileting schedule/hourly rounds    History of Falls Interventions: Bed/chair exit alarm, Evaluate medications/consider consulting pharmacy         Problem: Patient Education: Go to Patient Education Activity  Goal: Patient/Family Education  Outcome: Progressing Towards Goal     Problem: Patient Education: Go to Patient Education Activity  Goal: Patient/Family Education  Outcome: Progressing Towards Goal     Problem: Pain  Goal: *Control of Pain  Outcome: Progressing Towards Goal  Goal: *PALLIATIVE CARE:  Alleviation of Pain  Outcome: Progressing Towards Goal     Problem: Patient Education: Go to Patient Education Activity  Goal: Patient/Family Education  Outcome: Progressing Towards Goal     Problem: General Medical Care Plan  Goal: *Vital signs within specified parameters  Outcome: Progressing Towards Goal  Goal: *Labs within defined limits  Outcome: Progressing Towards Goal  Goal: *Absence of infection signs and symptoms  Outcome: Progressing Towards Goal  Goal: *Optimal pain control at patient's stated goal  Outcome: Progressing Towards Goal  Goal: *Skin integrity maintained  Outcome: Progressing Towards Goal  Goal: *Fluid volume balance  Outcome: Progressing Towards Goal  Goal: *Optimize nutritional status  Outcome: Progressing Towards Goal  Goal: *Anxiety reduced or absent  Outcome: Progressing Towards Goal  Goal: *Progressive mobility and function (eg: ADL's)  Outcome: Progressing Towards Goal     Problem: Patient Education: Go to Patient Education Activity  Goal: Patient/Family Education  Outcome: Progressing Towards Goal     Problem: Patient Education: Go to Patient Education Activity  Goal: Patient/Family Education  Outcome: Progressing Towards Goal     Problem: Patient Education: Go to Patient Education Activity  Goal: Patient/Family Education  Outcome: Progressing Towards Goal     Problem: Patient Education: Go to Patient Education Activity  Goal: Patient/Family Education  Outcome: Progressing Towards Goal     Problem: Pressure Injury - Risk of  Goal: *Prevention of pressure injury  Description: Document Bernabe Scale and appropriate interventions in the flowsheet. Outcome: Progressing Towards Goal     Problem: Patient Education: Go to Patient Education Activity  Goal: Patient/Family Education  Outcome: Progressing Towards Goal     Problem: Risk for Spread of Infection  Goal: Prevent transmission of infectious organism to others  Description: Prevent the transmission of infectious organisms to other patients, staff members, and visitors.   Outcome: Progressing Towards Goal     Problem: Patient Education:  Go to Education Activity  Goal: Patient/Family Education  Outcome: Progressing Towards Goal

## 2022-09-28 NOTE — PROGRESS NOTES
DARIUS: anticipate d/c to SNF; referrals pending to:   1st choice: [facility] pending  2nd choices: [facility] declined   [facility] declined  [facility] pending clinical progression    Pt was accepted by [facility] to provide [address] bed pending availability/delivery    Follow up with PCP & Specialist    [facility]    Primary family contact: David Mcwilliams [phone]  RUR: 18%  -Metastatic breast cancer with progressive liver mets  -Pt is on neutropenic precautions  -Nutrition consulted, poor appeite  -Pt/Ot following, min/moderate assistance, pt's spouse has Covid  -s/p parcentesis with ostomy bag draining  -1100-CM reviewed pt chart & met with pt at bedside to discuss transitional planning. Pt discussed that she had fallen prior to admission and feels very weak. CM discussed that [place] has been arranged, however pt is agreeable to SNF given her weakness. Referrals placed as noted above. CM also spoke with pt's spouse, Leona Ley, who advised that Attending stated to him that if next treatment plan does not work, they may have to consider hospice. Spouse agreeable to SNF referrals but stated he prefers pt to come home. CM to follow.  -1400-CM received a phone call from pt's spouse who advised that pt and him want [facility] as their first choice. CM to follow. Fiorella Stoddard RN BSN CCM  Transition of Care Plan:     The Plan for Transition of Care is related to the following treatment goals: SNFs, [facility]    The Patient and/or patient representative  was provided with a choice of provider and agrees  with the discharge plan. Yes [x] No []    A Freedom of choice list was provided with basic dialogue that supports the patient's individualized plan of care/goals and shares the quality data associated with the providers.        Yes [x] No []

## 2022-09-28 NOTE — PROGRESS NOTES
Comprehensive Nutrition Assessment    Type and Reason for Visit: Reassess    Nutrition Recommendations/Plan:   Continue regular diet, though pt is not eating  Continue magic cup once daily    Recommend continuing with megace, however megace can take weeks to increase appetite. Recommend initiation of tube feeds during this time. Pt is severely malnourished, has severe taste changes making food unappetizing, and is unable to meet protein/calorie needs orally. NGT vs. PEG pending pt goals of care? NGT may be more appropriate as megace will hopefully increase appetite. For either, recommend Nocturnal Jevity 1.5 @ 100 ml/hr x 12 hours (8p-8a) + 60 ml water flush q4h    Recommend adding thiamine 100 mg x 10 days to lessen risk of refeeding  Monitor Mg, Phos, K daily for refeeding risk  Please obtain updated weight       Malnutrition Assessment:  Malnutrition Status:  Severe malnutrition (09/22/22 1502)    Context:  Chronic illness     Findings of the 6 clinical characteristics of malnutrition:   Energy Intake:  75% or less est energy requirements for 1 month or longer  Weight Loss:  Greater than 5% over 1 month     Body Fat Loss:  Mild body fat loss, Orbital, Buccal region   Muscle Mass Loss:  Severe muscle mass loss, Clavicles (pectoralis &deltoids), Temples (temporalis), Hand (interosseous)  Fluid Accumulation:  Severe, Ascites   Strength:  Not performed        Nutrition Assessment:    Past Medical History:   Diagnosis Date    Breast cancer ([address]     breast, mets to bone    Colitis     Hypertension     Hypothyroidism     Multiple thyroid nodules      9/28: Follow up. Visited pt at bedside. She had a few bites of cheese cake and a few bites of pimento cheese today that a friend brought in. Feeling very weak today. I offered to order her food- refused other snacks, supplements, other foods. Phos 1.5.     9/27: Follow up. Per RN pt had \"a few bites\" of mac and cheese today that a friend brought in.  Anorexia persists. Attempted to call [facility] x2 to speak with attending physician regarding initiation of TF- left VM. Phos 1.2, Na 134. Addendum: Got a call back- spoke with attending MD's RN, Joslyn Vincent, who stated that attending MD is Dr. Russ Ceja, and she messaged him regarding initiation of TF.     9/26: Follow up. Visited pt at bedside where she stated her appetite has not improved at all. She has not been drinking ensure clear or eating magic cup supplements- they are too sweet. She'll take \"a few bites\" of a magic cup daily- will continue to send once/day. She'd like to d/c ensure clear. She stated her friends have been bringing in [place] I want\"- stated they brought her soup today but did not expand further. I ordered her white bread with butter during interview. Pt stated she is open to starting a TF as it is obvious she is unable to meet her needs via po. Phos 1.0 and trending down. Na 133.     9/22: Consult appreciated. 63 y.o. female admitted with metastatic breast cancer diagnosed 2018 with progressive liver mets currently on chemotherapy. Pt has ascites- has had 3L of fluid removed. Visited pt at bedside,  Ruddy Shore present. Pt/ stated that she's been unable to eat more than 2-3 bites of any food at a time. She's lost 7-9# over the last 1-3 months which is significant. Her sense of taste is altered d/t chemo and most foods taste very salty or bad. She was unable to provide me with any food choices that sound appealing. She doesn't like regular ensure but was unable to say why. She's open to ensure clear and magic cup to assist with kcal/pro intake, though doubtful she'll be able to eat everything I've ordered. She denied v/c/d today, was given zofran this morning for nausea. Noted that pt was started on megace this admission which will hopefully help but can take weeks to take effect on appetite. Given severity of pts malnutrition, recommend starting TF.  We talked about doing a nocturnal feed so pt can still eat during the day. PEG vs. NGT pending pt goals? NGT may be more appropriate as megace will hopefully increase appetite. Recommendation if needed: Nocturnal Jevity 1.5 @ 100 ml/hr x 12 hours (8p-8a) + 60 ml water flush q4h. This will provide 1800 kcals (100% of needs), 77 g pro (92% of needs), 1152 ml free water (TF+flush), 259 g CHO. This recommendation will work for NGT or PEG. Labs: phos 1.7, Mg 2.7. Recommend thiamine 100mg x 10 days to prevent refeeding if feeds are initiated. Recent Labs     09/28/22  0318 09/27/22  0310 09/26/22  0146   * 191* 168*   BUN 11 10 12   CREA 0.44* 0.46* 0.48*   * 134* 133*   K 4.0 4.0 3.9    104 103   CO2 25 23 25   CA 6.5* 6.3* 6.6*   PHOS 1.5* 1.2* 1.0*           Nutritionally Significant Medications:  Synthroid, megace, protonix, IV NaCl, zofran prn      Estimated Daily Nutrient Needs:  Energy Requirements Based On: Kcal/kg  Weight Used for Energy Requirements: Current  Energy (kcal/day): 5985-9834 (30-35 kcal/kg)  Weight Used for Protein Requirements: Current  Protein (g/day):  g (1.5-2 g/kg)  Method Used for Fluid Requirements: 1 ml/kcal  Fluid (ml/day): 1800    Nutrition Related Findings:   Edema: ascites  Last BM: 09/28/22, Formed, Soft    Wounds: None      Current Nutrition Therapies:  Diet: regular  Supplements: none  Meal intake: Patient Vitals for the past 168 hrs:   % Diet Eaten   09/22/22 0946 1 - 25%       Supplement intake: No data found. Nutrition Support: none      Anthropometric Measures:  Height: 5' 3\" (160 cm)  Ideal Body Weight (IBW): 115 lbs (52 kg)     Current Body Wt:  56.2 kg (123 lb 14.4 oz), 107.7 % IBW.  Standing scale  Current BMI (kg/m2): 22  Usual Body Weight: 59.9 kg (132 lb)  % Weight Change (Calculated): -6.1  Weight Adjustment: No adjustment        Wt Readings from Last 10 Encounters:   09/16/22 56.2 kg (123 lb 14.4 oz)   08/08/22 59 kg (130 lb)   06/28/22 59.9 kg (132 lb)   04/12/21 53.1 kg (117 lb)   10/28/19 66.5 kg (146 lb 9.7 oz)   05/22/19 68 kg (150 lb)   08/06/18 65.8 kg (145 lb)   08/06/18 63.5 kg (140 lb)           Nutrition Diagnosis:   Severe malnutrition, In context of chronic illness related to catabolic illness as evidenced by Criteria as identified in malnutrition assessment  Inadequate oral intake related to altered taste perception as evidenced by weight loss greater than or equal to 5% in 1 month, intake 0-25%    Nutrition Interventions:   Food and/or Nutrient Delivery: Continue current diet, Modify oral nutrition supplement  Nutrition Education/Counseling: Education not indicated  Coordination of Nutrition Care: Continue to monitor while inpatient, Coordination of care       Goals:  Previous Goal Met: No progress toward goal(s)  Goals: Initiate nutrition support, by next RD assessment       Nutrition Monitoring and Evaluation:   Behavioral-Environmental Outcomes: None identified  Food/Nutrient Intake Outcomes: Food and nutrient intake, Supplement intake, Enteral nutrition intake/tolerance  Physical Signs/Symptoms Outcomes: Biochemical data, Meal time behavior, Nutrition focused physical findings, Weight, GI status    Discharge Planning:     Too soon to determine    Yassine Mendoza RD  Available via [address] [place] [address]

## 2022-09-28 NOTE — PROGRESS NOTES
Problem: Mobility Impaired (Adult and Pediatric)  Goal: *Acute Goals and Plan of Care (Insert Text)  Description: FUNCTIONAL STATUS PRIOR TO ADMISSION: Patient was independent and active without use of DME up until last week or so prior to admission with gradual decline in activity. Since admission was up ad marquez until today and fell in room. HOME SUPPORT PRIOR TO ADMISSION: The patient lived with spouse but did not require assist.    Physical Therapy Goals  Weekly re-assessment 9/28/2022  1. Patient will move from supine to sit and sit to supine  and roll side to side in bed with minimal within 7 day(s). 2.  Patient will transfer from bed to chair and chair to bed with minimal using the least restrictive device within 7 day(s). 3.  Patient will perform sit to stand with contact guard 50% of trials within 7 day(s). 4.  Patient will ambulate with supervision/set-up for 30 feet with the least restrictive device within 7 day(s). Initiated 9/20/2022  1. Patient will move from supine to sit and sit to supine  and roll side to side in bed with supervision/set-up within 7 day(s). 2.  Patient will transfer from bed to chair and chair to bed with supervision/set-up using the least restrictive device within 7 day(s). 3.  Patient will perform sit to stand with supervision/set-up within 7 day(s). 4.  Patient will ambulate with supervision/set-up for 50 feet with the least restrictive device within 7 day(s). 5.  Patient will ascend/descend 12 stairs with 1 handrail(s) with minimal assistance/contact guard assist within 7 day(s). HOLD    Outcome: Progressing Towards Goal   PHYSICAL THERAPY TREATMENT: WEEKLY REASSESSMENT  Patient: James Patel (83 y.o. female)  Date: 9/28/2022  Primary Diagnosis: Dehydration [E86.0]       Precautions: neutropenic  Fall, Bed Alarm (chemo med precautions)      ASSESSMENT  Patient continues with skilled PT services and is not progressing towards goals.  Patient has had a decline this week with general mobility. She has had  minimal oral intake but slightly improved with friend bringing outside food. She is more alert and interactive as well. Time spent educating her on the importance of her doing exercises outside of therapy in order to build strength. Have provided paper exercises as well as written on her communication board. Encouraged her friend to work with her as well. Continues to have significant weakness throughout. Worked on sit to stand and again educated patient and nursing on technique for hands  on knees and rocking 3x. Left sitting up in chair and recommend 2 person assist back to bed due to lower height of chair. Discharge in question; needs rehab if not going hospice. If home, home health; Milly Leon Patient's progression toward goals since last assessment: goals downgraded due to lack of progress    Current Level of Function Impacting Discharge (mobility/balance): mod assist    Other factors to consider for discharge: spouse has COVID         PLAN :  Goals have been updated based on progression since last assessment. Patient continues to benefit from skilled intervention to address the above impairments. Recommendations and Planned Interventions: bed mobility training, transfer training, gait training, therapeutic exercises, patient and family training/education, and therapeutic activities      Frequency/Duration: Patient will be followed by physical therapy:  5 times a week to address goals.     Recommendation for discharge: (in order for the patient to meet his/her long term goals)  Therapy up to 5 days/week in SNF setting vs home with home health once  cleared from [facility]    This discharge recommendation:  Has been made in collaboration with the attending provider and/or case management    IF patient discharges home will need the following DME: bedside commode, hospital bed, transfer bench, rolling walker, and wheelchair         SUBJECTIVE: Patient stated I'm okay.     OBJECTIVE DATA SUMMARY:   HISTORY:    Past Medical History:   Diagnosis Date    Breast cancer ([address]     breast, mets to bone    Colitis     Hypertension     Hypothyroidism     Multiple thyroid nodules      Past Surgical History:   Procedure Laterality Date    HX HYSTERECTOMY      w/BSO    HX MASTECTOMY Bilateral     HX ORTHOPAEDIC      wrist    HX TONSILLECTOMY         Personal factors and/or comorbidities impacting plan of care:     Home Situation  Home Environment: Private residence  # Steps to Enter: 4  Rails to Enter: Yes  One/Two Story Residence: Two story  Living Alone: No  Support Systems: Spouse/Significant Other  Patient Expects to be Discharged to[de-identified] Skilled nursing facility  Current DME Used/Available at Home: None  Tub or Shower Type: Shower    EXAMINATION/PRESENTATION/DECISION MAKING:   Critical Behavior:  Neurologic State: Alert  Orientation Level: Oriented X4  Cognition: Follows commands  Safety/Judgement: Awareness of environment  Functional Mobility:  Bed Mobility:  Rolling: Minimum assistance; Additional time  Supine to Sit: Moderate assistance     Scooting: Minimum assistance  Transfers:  Sit to Stand: Minimum assistance; Moderate assistance  Stand to Sit: Contact guard assistance        Bed to Chair: Minimum assistance              Balance:   Sitting: Impaired  Sitting - Static: Good (unsupported)  Sitting - Dynamic: Good (unsupported)  Standing: Impaired; With support  Standing - Static: Constant support; Fair  Standing - Dynamic : Constant support; Fair  Ambulation/Gait Training:  Distance (ft): 12 Feet (ft)  Assistive Device: Gait belt;Walker, rolling  Ambulation - Level of Assistance: Contact guard assistance        Gait Abnormalities: Decreased step clearance              Speed/Arleen: Pace decreased (<100 feet/min)  Step Length: Right shortened;Left shortened           Therapeutic Exercises:   Heel slided, assisted SLR, hip abd/add; glut and quad isometric  Activity Tolerance:   Fair and requires rest breaks    After treatment patient left in no apparent distress:   Sitting in chair, Call bell within reach, and Caregiver / family present    COMMUNICATION/EDUCATION:   The patients plan of care was discussed with: Registered nurse. Fall prevention education was provided and the patient/caregiver indicated understanding., Patient/family have participated as able in goal setting and plan of care. , and Patient/family agree to work toward stated goals and plan of care.     Thank you for this referral.  Allegra Abad, PT   Time Calculation: 30 mins

## 2022-09-28 NOTE — PROGRESS NOTES
Pt is laying in bed with even and unlabored respirations on room air. No complaints of pain at this time. No distress noted. Continuing IV hydration. Pt ate 0-25% of each meal. OT and PT worked with the pt during the shift. Port was cleansed with CHG. Continuing to monitor lab values. Safety precautions are in place. Bed in low position and locked. Call bell within reach. Problem: Falls - Risk of  Goal: *Absence of Falls  Description: Document Brooklyn Cavazos Fall Risk and appropriate interventions in the flowsheet.   Outcome: Progressing Towards Goal  Note: Fall Risk Interventions:  Mobility Interventions: Bed/chair exit alarm, Communicate number of staff needed for ambulation/transfer    Mentation Interventions: Bed/chair exit alarm, Adequate sleep, hydration, pain control, Door open when patient unattended, Evaluate medications/consider consulting pharmacy    Medication Interventions: Bed/chair exit alarm, Evaluate medications/consider consulting pharmacy    Elimination Interventions: Call light in reach, Bed/chair exit alarm, Patient to call for help with toileting needs, Toileting schedule/hourly rounds    History of Falls Interventions: Bed/chair exit alarm, Door open when patient unattended, Evaluate medications/consider consulting pharmacy, Investigate reason for fall         Problem: Patient Education: Go to Patient Education Activity  Goal: Patient/Family Education  Outcome: Progressing Towards Goal     Problem: Patient Education: Go to Patient Education Activity  Goal: Patient/Family Education  Outcome: Progressing Towards Goal     Problem: Pain  Goal: *Control of Pain  Outcome: Progressing Towards Goal  Goal: *PALLIATIVE CARE:  Alleviation of Pain  Outcome: Progressing Towards Goal     Problem: Patient Education: Go to Patient Education Activity  Goal: Patient/Family Education  Outcome: Progressing Towards Goal     Problem: General Medical Care Plan  Goal: *Vital signs within specified parameters  Outcome: Progressing Towards Goal  Goal: *Labs within defined limits  Outcome: Progressing Towards Goal  Goal: *Absence of infection signs and symptoms  Outcome: Progressing Towards Goal  Goal: *Optimal pain control at patient's stated goal  Outcome: Progressing Towards Goal  Goal: *Skin integrity maintained  Outcome: Progressing Towards Goal  Goal: *Fluid volume balance  Outcome: Progressing Towards Goal  Goal: *Optimize nutritional status  Outcome: Progressing Towards Goal  Goal: *Anxiety reduced or absent  Outcome: Progressing Towards Goal  Goal: *Progressive mobility and function (eg: ADL's)  Outcome: Progressing Towards Goal     Problem: Patient Education: Go to Patient Education Activity  Goal: Patient/Family Education  Outcome: Progressing Towards Goal     Problem: Patient Education: Go to Patient Education Activity  Goal: Patient/Family Education  Outcome: Progressing Towards Goal     Problem: Patient Education: Go to Patient Education Activity  Goal: Patient/Family Education  Outcome: Progressing Towards Goal     Problem: Patient Education: Go to Patient Education Activity  Goal: Patient/Family Education  Outcome: Progressing Towards Goal     Problem: Pressure Injury - Risk of  Goal: *Prevention of pressure injury  Description: Document Bernabe Scale and appropriate interventions in the flowsheet. Outcome: Progressing Towards Goal     Problem: Patient Education: Go to Patient Education Activity  Goal: Patient/Family Education  Outcome: Progressing Towards Goal     Problem: Risk for Spread of Infection  Goal: Prevent transmission of infectious organism to others  Description: Prevent the transmission of infectious organisms to other patients, staff members, and visitors.   Outcome: Progressing Towards Goal     Problem: Patient Education:  Go to Education Activity  Goal: Patient/Family Education  Outcome: Progressing Towards Goal

## 2022-09-28 NOTE — PROGRESS NOTES
Hematology-Oncology Progress Note    James Patel  1960  [id number]  9/28/2022    Follow-up for: metastatic breast cancer     [x]        Chart notes since last visit reviewed   [x]        Medications reviewed for allergies and interactions       Case discussed with the following:         []                            []        Nursing Staff esperanza                                                                        []        Pathologist                                                                        []        FAMILY       Subjective:     Pt denies pain, is very weak;   Eating very little; up to chair very little.   Sleeping a lot    Objective:   Patient Vitals for the past 24 hrs:   BP Temp Pulse Resp SpO2   09/28/22 1303 134/78 98 °F (36.7 °C) 85 16 98 %   09/28/22 0936 132/76 98.1 °F (36.7 °C) 86 16 96 %   09/28/22 0335 118/78 98.1 °F (36.7 °C) 67 16 96 %   09/27/22 1901 125/79 98.5 °F (36.9 °C) 72 17 97 %         REVIEW OF SYSTEMS:    Constitutional: negative fever, negative chills, negative weight loss  Eyes:   negative visual changes  ENT:   negative sore throat, tongue or lip swelling  Respiratory:  negative cough, negative dyspnea  Cards:  negative for chest pain, palpitations, lower extremity edema  GI:   negative for nausea, vomiting, diarrhea, and abdominal pain  Neuro:  negative for headaches, dizziness, vertigo  []                        Full ROS o/w normal/non contributor    Constitutional:  Patient looks  [x]        Sick  [x]        Frail  []        Better                                                 []        Depressed    HEENT:  []   NC                         []   AT               []    ALOPECIA           Eyes: []   Normal               [x]    Icteric  Oropharynx: []    Normal                  []  Thrush               []   Dry  Mucositis: []    None                 Grade: []        I  []        II  []        III  []        IV  Neck:   [x]   Supple                  [] Rigid               JVD:    []   ABSENT       []   PRESENT  Lymphadenopathy:   []   None Noted            []   PRESENT    Chest:  [x]   Clear               []    Rhonchi                      Dec'd @     []  Right Base           []   Left Base    CV:             [x]   Regular              []  Irregular               []   Tachy                []   Murmur  Abdominal:   []    Soft              [x]   NON-tender               []   Tender    distended w ascites  BS:    []   ABSENT                   [x]   PRESENT  Liver:     []  NON-palp                  [x]   EDGE- palp  Spleen: [x]   NON-palp                   [x]  EDGE - palp   ascites. Mass:   [x]   ABSENT                          []  PRESENT  Extr:    [x]  Lymphedema             []   Cyanosis      []  Clubbing  Edema:     []   NONE       [x]   PRESENT  Skin:  Intact [x]           Purpura []        Rash: []   ABSENT       []  PRESENT  Neuro:  [x]        Normal  []        Confused      Available labs reviewed:  Labs:    Recent Results (from the past 24 hour(s))   CBC WITH AUTOMATED DIFF    Collection Time: 09/28/22  3:18 AM   Result Value Ref Range    WBC 1.7 (L) 3.6 - 11.0 K/uL    RBC 2.44 (L) 3.80 - 5.20 M/uL    HGB 8.1 (L) 11.5 - 16.0 g/dL    HCT 24.0 (L) 35.0 - 47.0 %    MCV 98.4 80.0 - 99.0 FL    MCH 33.2 26.0 - 34.0 PG    MCHC 33.8 30.0 - 36.5 g/dL    RDW 19.2 (H) 11.5 - 14.5 %    PLATELET 72 (L) 780 - 400 K/uL    MPV 10.5 8.9 - 12.9 FL    NRBC 7.1 (H) 0  WBC    ABSOLUTE NRBC 0.12 (H) 0.00 - 0.01 K/uL    NEUTROPHILS 30 (L) 32 - 75 %    LYMPHOCYTES 31 12 - 49 %    MONOCYTES 25 (H) 5 - 13 %    EOSINOPHILS 0 0 - 7 %    BASOPHILS 1 0 - 1 %    METAMYELOCYTES 8 (H) 0 %    MYELOCYTES 4 (H) 0 %    PROMYELOCYTES 1 (H) 0 %    IMMATURE GRANULOCYTES 0 %    ABS. NEUTROPHILS 0.5 (L) 1.8 - 8.0 K/UL    ABS. LYMPHOCYTES 0.5 (L) 0.8 - 3.5 K/UL    ABS. MONOCYTES 0.4 0.0 - 1.0 K/UL    ABS. EOSINOPHILS 0.0 0.0 - 0.4 K/UL    ABS. BASOPHILS 0.0 0.0 - 0.1 K/UL    ABS. IMM. GRANS. 0.0 K/UL    DF MANUAL      RBC COMMENTS ANISOCYTOSIS  1+        RBC COMMENTS MACROCYTOSIS  1+       PHOSPHORUS    Collection Time: 09/28/22  3:18 AM   Result Value Ref Range    Phosphorus 1.5 (L) 2.6 - 4.7 MG/DL   METABOLIC PANEL, COMPREHENSIVE    Collection Time: 09/28/22  3:18 AM   Result Value Ref Range    Sodium 134 (L) 136 - 145 mmol/L    Potassium 4.0 3.5 - 5.1 mmol/L    Chloride 103 97 - 108 mmol/L    CO2 25 21 - 32 mmol/L    Anion gap 6 5 - 15 mmol/L    Glucose 201 (H) 65 - 100 mg/dL    BUN 11 6 - 20 MG/DL    Creatinine 0.44 (L) 0.55 - 1.02 MG/DL    BUN/Creatinine ratio 25 (H) 12 - 20      GFR est AA >60 >60 ml/min/1.73m2    GFR est non-AA >60 >60 ml/min/1.73m2    Calcium 6.5 (L) 8.5 - 10.1 MG/DL    Bilirubin, total 3.0 (H) 0.2 - 1.0 MG/DL    ALT (SGPT) 79 (H) 12 - 78 U/L    AST (SGOT) 152 (H) 15 - 37 U/L    Alk. phosphatase 489 (H) 45 - 117 U/L    Protein, total 4.0 (L) 6.4 - 8.2 g/dL    Albumin 1.6 (L) 3.5 - 5.0 g/dL    Globulin 2.4 2.0 - 4.0 g/dL    A-G Ratio 0.7 (L) 1.1 - 2.2         Available Xrays reviewed:    Chemotherapy monitored and toxicities assessed:    Assessment and Plan   Metastatic breast cancer with progressive liver mets. .  she has been through all available hormornal therapies as well as xeloda, and picqray. Sunday 9/18 received cycle I carbo/Taxol with full dose carbo and 1/2 dose of her weekly Taxol. Day 7 was due on 9/23 but we are holding day 8  due to neutropenia and thrombocytopenia  \"She has pancytopenia due to chemotherapy\"    Hypotension. Resolved; continue 75 cc per hour N  Profound anorexia. Megace added. She is on prophylactic enoxaparin. Nutrition consult. She needs popsickles, soups, and good-tasting supplements. Anemia. .hgb is 8.1  plts 72k ; stool heme pos. Follow daily counts  Ascites. 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 Ostomy bag in place  5. Debility- PT/OT consult  unable to participate  6. Nausea is better  7. Hypocalcemia severe  8. Liver failure. Alk phos up to 489; T bili was 6.0, down to 3.0 after chemotherapy. I had a long talk with Ms. Gurrola about her situation and what she wants. This is probably the most coherent she's been in the hospital. She definitely does not want a feeding tube. She does not want to go to rehab, she wants to go home. She does not want more chemotherapy. She would like hospice at home. I will ask for a hospice consult tomorrow. I think her counts are at their andre. If her  is feeling better and afebrile, she may be able to get home Friday.  sick at home with covid - not sure when he was diagnosed, but 5 days of quarantining should be enough if his symptoms are better and he is afebrile.      Angela Fowler MD

## 2022-09-29 NOTE — HOSPICE
[facility]  [phone]     Patient Name: Rayne Andrade  YOB: 1960  Age: 63 y.o. [address] Sherwin Chavis RN Note:  Hospice consult received, reviewing chart. Will follow up with Unit Nurse and Care Manager to discuss plan of care, patient status and discharge disposition within the hour. Thank you for the opportunity to be of service to this patient. Erik Chavis has confirmed availability to services this patient and her family at her current home address.     Agustín Hernandez RN, [place] [id number] Nurse Liaison  [phone] Mobile  [phone] Office  Available on [employer]

## 2022-09-29 NOTE — PROGRESS NOTES
Hematology-Oncology Progress Note    Mitchel Srivastava  1960  [id number]  9/29/2022    Follow-up for: metastatic breast cancer     [x]        Chart notes since last visit reviewed   [x]        Medications reviewed for allergies and interactions       Case discussed with the following:         []                            []        Nursing Staff esperanza                                                                        []        Pathologist                                                                        []        FAMILY       Subjective:     Pt denies pain, is very weak;   Wants to go home    Objective:   Patient Vitals for the past 24 hrs:   BP Temp Pulse Resp SpO2   09/29/22 0912 118/74 99 °F (37.2 °C) 76 15 97 %   09/29/22 0148 123/77 98.3 °F (36.8 °C) 72 15 97 %   09/28/22 1926 133/79 98 °F (36.7 °C) 76 16 96 %   09/28/22 1303 134/78 98 °F (36.7 °C) 85 16 98 %         REVIEW OF SYSTEMS:    Constitutional: negative fever, negative chills, negative weight loss  Eyes:   negative visual changes  ENT:   negative sore throat, tongue or lip swelling  Respiratory:  negative cough, negative dyspnea  Cards:  negative for chest pain, palpitations, lower extremity edema  GI:   negative for nausea, vomiting, diarrhea, and abdominal pain  Neuro:  negative for headaches, dizziness, vertigo  []                        Full ROS o/w normal/non contributor    Constitutional:  Patient looks  [x]        Sick  [x]        Frail  []        Better                                                 []        Depressed    HEENT:  []   NC                         []   AT               []    ALOPECIA           Eyes: []   Normal               [x]    Icteric  Oropharynx: []    Normal                  []  Thrush               []   Dry  Mucositis: []    None                 Grade: []        I  []        II  []        III  []        IV  Neck:   [x]   Supple                  []  Rigid               JVD:    []   ABSENT       [] PRESENT  Lymphadenopathy:   []   None Noted            []   PRESENT    Chest:  [x]   Clear               []    Rhonchi                      Dec'd @     []  Right Base           []   Left Base    CV:             [x]   Regular              []  Irregular               []   Tachy                []   Murmur  Abdominal:   []    Soft              [x]   NON-tender               []   Tender    distended w ascites  BS:    []   ABSENT                   [x]   PRESENT  Liver:     []  NON-palp                  [x]   EDGE- palp  Spleen: [x]   NON-palp                   [x]  EDGE - palp   ascites. Mass:   [x]   ABSENT                          []  PRESENT  Extr:    [x]  Lymphedema             []   Cyanosis      []  Clubbing  Edema:     []   NONE       [x]   PRESENT  Skin:  Intact [x]           Purpura []        Rash: []   ABSENT       []  PRESENT  Neuro:  [x]        Normal  []        Confused      Available labs reviewed:  Labs:    Recent Results (from the past 24 hour(s))   CBC WITH AUTOMATED DIFF    Collection Time: 09/29/22  1:51 AM   Result Value Ref Range    WBC 2.1 (L) 3.6 - 11.0 K/uL    RBC 2.53 (L) 3.80 - 5.20 M/uL    HGB 8.2 (L) 11.5 - 16.0 g/dL    HCT 25.1 (L) 35.0 - 47.0 %    MCV 99.2 (H) 80.0 - 99.0 FL    MCH 32.4 26.0 - 34.0 PG    MCHC 32.7 30.0 - 36.5 g/dL    RDW 19.5 (H) 11.5 - 14.5 %    PLATELET 62 (L) 621 - 400 K/uL    MPV 12.4 8.9 - 12.9 FL    NRBC 10.1 (H) 0  WBC    ABSOLUTE NRBC 0.21 (H) 0.00 - 0.01 K/uL    NEUTROPHILS 42 32 - 75 %    BAND NEUTROPHILS 4 0 - 6 %    LYMPHOCYTES 29 12 - 49 %    MONOCYTES 14 (H) 5 - 13 %    EOSINOPHILS 0 0 - 7 %    BASOPHILS 1 0 - 1 %    METAMYELOCYTES 3 (H) 0 %    MYELOCYTES 6 (H) 0 %    PROMYELOCYTES 1 (H) 0 %    IMMATURE GRANULOCYTES 0 %    ABS. NEUTROPHILS 1.0 (L) 1.8 - 8.0 K/UL    ABS. LYMPHOCYTES 0.6 (L) 0.8 - 3.5 K/UL    ABS. MONOCYTES 0.3 0.0 - 1.0 K/UL    ABS. EOSINOPHILS 0.0 0.0 - 0.4 K/UL    ABS. BASOPHILS 0.0 0.0 - 0.1 K/UL    ABS. IMM.  GRANS. 0.0 K/UL    DF MANUAL      RBC COMMENTS ANISOCYTOSIS  1+        RBC COMMENTS MACROCYTOSIS  1+        RBC COMMENTS POLYCHROMASIA  PRESENT       METABOLIC PANEL, COMPREHENSIVE    Collection Time: 09/29/22  1:51 AM   Result Value Ref Range    Sodium 132 (L) 136 - 145 mmol/L    Potassium 4.0 3.5 - 5.1 mmol/L    Chloride 104 97 - 108 mmol/L    CO2 23 21 - 32 mmol/L    Anion gap 5 5 - 15 mmol/L    Glucose 239 (H) 65 - 100 mg/dL    BUN 10 6 - 20 MG/DL    Creatinine 0.51 (L) 0.55 - 1.02 MG/DL    BUN/Creatinine ratio 20 12 - 20      GFR est AA >60 >60 ml/min/1.73m2    GFR est non-AA >60 >60 ml/min/1.73m2    Calcium 6.7 (L) 8.5 - 10.1 MG/DL    Bilirubin, total 2.8 (H) 0.2 - 1.0 MG/DL    ALT (SGPT) 89 (H) 12 - 78 U/L    AST (SGOT) 145 (H) 15 - 37 U/L    Alk. phosphatase 577 (H) 45 - 117 U/L    Protein, total 4.4 (L) 6.4 - 8.2 g/dL    Albumin 1.6 (L) 3.5 - 5.0 g/dL    Globulin 2.8 2.0 - 4.0 g/dL    A-G Ratio 0.6 (L) 1.1 - 2.2     PHOSPHORUS    Collection Time: 09/29/22  1:51 AM   Result Value Ref Range    Phosphorus 1.1 (L) 2.6 - 4.7 MG/DL       Available Xrays reviewed:    Chemotherapy monitored and toxicities assessed:    Assessment and Plan   Metastatic breast cancer with progressive liver mets. .  she has been through all available hormornal therapies as well as xeloda, and picqray. Sunday 9/18 received cycle I carbo/Taxol with full dose carbo and 1/2 dose of her weekly Taxol. Day 7 was due on 9/23 but we are holding day 8  due to neutropenia and thrombocytopenia  \"She has pancytopenia due to chemotherapy\"    Hypotension. Resolved; continue 75 cc per hour N  Profound anorexia. Megace added. She is on prophylactic enoxaparin. Nutrition consult. She needs popsickles, soups, and good-tasting supplements. Anemia. .hgb is 8.2  plts 62k ; stool heme pos. Ascites. 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 Ostomy bag in place  5. Debility- PT/OT consult  unable to participate  6. Nausea is better  7. Hypocalcemia severe  8. Liver failure. Alk phos up to 489; T bili was 6.0, down to 3.0 after chemotherapy. Pt wants to go home as soon as she can. Will have hospice follow. Will hold off on further labs     sick at home with covid - not sure when he was diagnosed, but 5 days of quarantining should be enough if his symptoms are better and he is afebrile.      Lucius Mcfarlane MD

## 2022-09-29 NOTE — PROGRESS NOTES
Hospice Medical Director    Chantell Candelaria, hospice liaison and nurse talked with patient and her . We received a consult for hospice care. Family has agreed to proceed with hospice. We are investigating the possibility of permanent abdominal drain for recurrent ascites to see if possibly could be done prior to discharge. If not, we will plan on discharge with current ostomy bag over the opening. CODE STATUS also was reviewed with patient and her  and she agrees to no attempts at resuscitation. Durable DO NOT RESUSCITATE form has been signed. I have changed her CODE STATUS in the hospital record as well. Thank you for the referral and plan on admission tomorrow at home.

## 2022-09-29 NOTE — PROGRESS NOTES
Comprehensive Nutrition Assessment    Type and Reason for Visit: Reassess    Nutrition Recommendations/Plan:   Continue regular diet  Continue magic cup once daily       Malnutrition Assessment:  Malnutrition Status:  Severe malnutrition (09/22/22 1502)    Context:  Chronic illness     Findings of the 6 clinical characteristics of malnutrition:   Energy Intake:  75% or less est energy requirements for 1 month or longer  Weight Loss:  Greater than 5% over 1 month     Body Fat Loss:  Mild body fat loss, Orbital, Buccal region   Muscle Mass Loss:  Severe muscle mass loss, Clavicles (pectoralis &deltoids), Temples (temporalis), Hand (interosseous)  Fluid Accumulation:  Severe, Ascites   Strength:  Not performed        Nutrition Assessment:    Past Medical History:   Diagnosis Date    Breast cancer ([facility] [address]     breast, mets to bone    Colitis     Hypertension     Hypothyroidism     Multiple thyroid nodules      9/29: Follow up. Per MD note pt does not want TF, does not want to continue chemo. Hospice consult placed. 9/28: Follow up. Visited pt at bedside. She had a few bites of cheese cake and a few bites of pimento cheese today that a friend brought in. Feeling very weak today. I offered to order her food- refused other snacks, supplements, other foods. Phos 1.5.     9/27: Follow up. Per RN pt had \"a few bites\" of mac and cheese today that a friend brought in. Anorexia persists. Attempted to call [facility] x2 to speak with attending physician regarding initiation of TF- left VM. Phos 1.2, Na 134. Addendum: Got a call back- spoke with attending MD's RN, Norberto Balderas, who stated that attending MD is Dr. Jasvir Maxwell, and she messaged him regarding initiation of TF.     9/26: Follow up. Visited pt at bedside where she stated her appetite has not improved at all. She has not been drinking ensure clear or eating magic cup supplements- they are too sweet. She'll take \"a few bites\" of a magic cup daily- will continue to send once/day. She'd like to d/c ensure clear. She stated her friends have been bringing in [place] I want\"- stated they brought her soup today but did not expand further. I ordered her white bread with butter during interview. Pt stated she is open to starting a TF as it is obvious she is unable to meet her needs via po. Phos 1.0 and trending down. Na 133.     9/22: Consult appreciated. 63 y.o. female admitted with metastatic breast cancer diagnosed 2018 with progressive liver mets currently on chemotherapy. Pt has ascites- has had 3L of fluid removed. Visited pt at bedside,  Haylee Welch present. Pt/ stated that she's been unable to eat more than 2-3 bites of any food at a time. She's lost 7-9# over the last 1-3 months which is significant. Her sense of taste is altered d/t chemo and most foods taste very salty or bad. She was unable to provide me with any food choices that sound appealing. She doesn't like regular ensure but was unable to say why. She's open to ensure clear and magic cup to assist with kcal/pro intake, though doubtful she'll be able to eat everything I've ordered. She denied v/c/d today, was given zofran this morning for nausea. Noted that pt was started on megace this admission which will hopefully help but can take weeks to take effect on appetite. Given severity of pts malnutrition, recommend starting TF. We talked about doing a nocturnal feed so pt can still eat during the day. PEG vs. NGT pending pt goals? NGT may be more appropriate as megace will hopefully increase appetite. Recommendation if needed: Nocturnal Jevity 1.5 @ 100 ml/hr x 12 hours (8p-8a) + 60 ml water flush q4h. This will provide 1800 kcals (100% of needs), 77 g pro (92% of needs), 1152 ml free water (TF+flush), 259 g CHO. This recommendation will work for NGT or PEG. Labs: phos 1.7, Mg 2.7. Recommend thiamine 100mg x 10 days to prevent refeeding if feeds are initiated.      Recent Labs     09/29/22  0151 09/28/22  5081 09/27/22  0310   * 201* 191*   BUN 10 11 10   CREA 0.51* 0.44* 0.46*   * 134* 134*   K 4.0 4.0 4.0    103 104   CO2 23 25 23   CA 6.7* 6.5* 6.3*   PHOS 1.1* 1.5* 1.2*         Nutritionally Significant Medications:  Synthroid, megace, protonix, IV NaCl, zofran prn      Estimated Daily Nutrient Needs:  Energy Requirements Based On: Kcal/kg  Weight Used for Energy Requirements: Current  Energy (kcal/day): 8915-2834 (30-35 kcal/kg)  Weight Used for Protein Requirements: Current  Protein (g/day):  g (1.5-2 g/kg)  Method Used for Fluid Requirements: 1 ml/kcal  Fluid (ml/day): 1800    Nutrition Related Findings:   Edema: ascites  Last BM: 09/28/22, Formed, Soft    Wounds: None      Current Nutrition Therapies:  Diet: regular  Supplements: magic cup once/day  Meal intake: Patient Vitals for the past 168 hrs:   % Diet Eaten   09/22/22 0946 1 - 25%     Supplement intake: No data found. Nutrition Support: none      Anthropometric Measures:  Height: 5' 3\" (160 cm)  Ideal Body Weight (IBW): 115 lbs (52 kg)     Current Body Wt:  56.2 kg (123 lb 14.4 oz), 107.7 % IBW.  Standing scale  Current BMI (kg/m2): 22  Usual Body Weight: 59.9 kg (132 lb)  % Weight Change (Calculated): -6.1  Weight Adjustment: No adjustment        Wt Readings from Last 10 Encounters:   09/16/22 56.2 kg (123 lb 14.4 oz)   08/08/22 59 kg (130 lb)   06/28/22 59.9 kg (132 lb)   04/12/21 53.1 kg (117 lb)   10/28/19 66.5 kg (146 lb 9.7 oz)   05/22/19 68 kg (150 lb)   08/06/18 65.8 kg (145 lb)   08/06/18 63.5 kg (140 lb)           Nutrition Diagnosis:   Severe malnutrition, In context of chronic illness related to catabolic illness as evidenced by Criteria as identified in malnutrition assessment  Inadequate oral intake related to altered taste perception as evidenced by weight loss greater than or equal to 5% in 1 month, intake 0-25%    Nutrition Interventions:   Food and/or Nutrient Delivery: Continue current diet, Modify oral nutrition supplement  Nutrition Education/Counseling: Education not indicated  Coordination of Nutrition Care: Continue to monitor while inpatient, Coordination of care       Goals:  Previous Goal Met: No progress toward goal(s)  Goals: PO intake 50% or greater, by next RD assessment       Nutrition Monitoring and Evaluation:   Behavioral-Environmental Outcomes: None identified  Food/Nutrient Intake Outcomes: Food and nutrient intake, Supplement intake  Physical Signs/Symptoms Outcomes: Biochemical data, Meal time behavior, Nutrition focused physical findings, Weight, GI status    Discharge Planning:    Continue oral nutrition supplement    Ashlee Sparrow RD  Available via [employer]

## 2022-09-29 NOTE — PROGRESS NOTES
Pt is laying in bed with even and unlabored respirations on room air. No complaints of pain at this time. No distress noted. Plan is for the pt to go home on hospice tomorrow. RN talked to the pt about going home with a graham but she wanted to wait until tomorrow to decide if she wanted a graham placed. RN spoke to Ashlee Curiel from hospice about not being sure if she wanted a graham. Safety precautions are in place. Bed in low position and locked. Call bell within reach. Problem: Falls - Risk of  Goal: *Absence of Falls  Description: Document [facility] Fall Risk and appropriate interventions in the flowsheet.   Outcome: Progressing Towards Goal  Note: Fall Risk Interventions:  Mobility Interventions: Communicate number of staff needed for ambulation/transfer    Mentation Interventions: Door open when patient unattended, Adequate sleep, hydration, pain control, Evaluate medications/consider consulting pharmacy    Medication Interventions: Evaluate medications/consider consulting pharmacy, Patient to call before getting OOB    Elimination Interventions: Call light in reach, Patient to call for help with toileting needs, Stay With Me (per policy)    History of Falls Interventions: Consult care management for discharge planning, Door open when patient unattended, Evaluate medications/consider consulting pharmacy         Problem: Patient Education: Go to Patient Education Activity  Goal: Patient/Family Education  Outcome: Progressing Towards Goal     Problem: Patient Education: Go to Patient Education Activity  Goal: Patient/Family Education  Outcome: Progressing Towards Goal     Problem: Pain  Goal: *Control of Pain  Outcome: Progressing Towards Goal  Goal: *PALLIATIVE CARE:  Alleviation of Pain  Outcome: Progressing Towards Goal     Problem: Patient Education: Go to Patient Education Activity  Goal: Patient/Family Education  Outcome: Progressing Towards Goal     Problem: General Medical Care Plan  Goal: *Vital signs within specified parameters  Outcome: Progressing Towards Goal  Goal: *Labs within defined limits  Outcome: Progressing Towards Goal  Goal: *Absence of infection signs and symptoms  Outcome: Progressing Towards Goal  Goal: *Optimal pain control at patient's stated goal  Outcome: Progressing Towards Goal  Goal: *Skin integrity maintained  Outcome: Progressing Towards Goal  Goal: *Fluid volume balance  Outcome: Progressing Towards Goal  Goal: *Optimize nutritional status  Outcome: Progressing Towards Goal  Goal: *Anxiety reduced or absent  Outcome: Progressing Towards Goal  Goal: *Progressive mobility and function (eg: ADL's)  Outcome: Progressing Towards Goal     Problem: Patient Education: Go to Patient Education Activity  Goal: Patient/Family Education  Outcome: Progressing Towards Goal     Problem: Patient Education: Go to Patient Education Activity  Goal: Patient/Family Education  Outcome: Progressing Towards Goal     Problem: Patient Education: Go to Patient Education Activity  Goal: Patient/Family Education  Outcome: Progressing Towards Goal     Problem: Patient Education: Go to Patient Education Activity  Goal: Patient/Family Education  Outcome: Progressing Towards Goal     Problem: Pressure Injury - Risk of  Goal: *Prevention of pressure injury  Description: Document Bernabe Scale and appropriate interventions in the flowsheet. Outcome: Progressing Towards Goal     Problem: Patient Education: Go to Patient Education Activity  Goal: Patient/Family Education  Outcome: Progressing Towards Goal     Problem: Risk for Spread of Infection  Goal: Prevent transmission of infectious organism to others  Description: Prevent the transmission of infectious organisms to other patients, staff members, and visitors.   Outcome: Progressing Towards Goal     Problem: Patient Education:  Go to Education Activity  Goal: Patient/Family Education  Outcome: Progressing Towards Goal

## 2022-09-29 NOTE — PROGRESS NOTES
DARIUS: anticipate d/c home with [facility] [place] [facility]\""TL; Hospice Agency added to AVS;  Spouse intends to hire private pay caregivers, list provided    Pt contacted Vikas Warren of [employer] to inform of plan for transition to hospice and that pt will not need DME items as they will be covered under hospice benefit    Spouse currently has a BSC & w/c for pt & has also recently started renting a hospital bed through [employer]    Spouse is Covid positive, tested on 9/25, he is asymptomatic and is fully vaccinated and boosted  As per spouse's report, pt is also fully vaccinated and boosted for Covid    BLS Transport scheduled for 10am with [facility]    Primary family contact: Matt Persons [phone]    RUR: 18%  -Metastatic breast cancer with progressive liver mets  -Pt is on neutropenic precautions  -Nutrition consulted, poor appeite  -Pt/Ot following, min/moderate assistance, pt's spouse has Covid  -s/p parcentesis with ostomy bag draining  -0900-CM reviewed pt chart & noted CM consult for hospice evaluation. CM contacted spouse, Jessica Painting, to discuss hospice choices. Spouse agreeable to [facility] \[facility] [place] [facility]\""TL. CM placed referral in cclink and notified Pepe Rogers of [facility] of referral [phone] and she advised she intends to speak with spouse soon. CM sent email to spouse with private pay caregiver list. CM to follow.  -1400-CM was informed by Pepe Rogers of [facility] of plan for hospice admission for 11am and she is requesting transport for 10am. CM placed request and confirmed transport as noted above. CM to follow.   Lilibeth Ingram RN BSN CCM

## 2022-09-29 NOTE — HOSPICE
[facility]  [phone]    Patient Name: Camryn Graves  YOB: 1960  Age: 63 y.o. [facility] LCSW Note:  Hospice consult noted. Chart reviewed. Plan of care discussed with patients nurse & care manager. This LCSW and Jessica Ibanez RN in to meet with pt, who is lethargic and weak, and her sister Ladoris Pontiff. Pt's  Radha Altamirano was on speaker phone as he is COVID +. Per discussion,  is asymptomatic and tested positive on 9/25, will be in quarantine for 5 days. Pt reports she is tired and \"ready to die\". Discussed Hospice philosophy, general plan of care, levels of care, services and on call procedures. The goal for pt is to return home with large extended family. LCSW will send consents via Doc U Sign once insurance authorization has been completed as pt is too weak to sign consents. Hospice team, pt, and family discussed home hospice services and supports at length. LCSW discussed code status with pt and . DDNR will be completed per pts wishes via Doc U Sign. Consents Reviewed: Yes  Person Reviewed/Signed with: pts  Roge Bai   Right to NCD Reviewed:   NCD Requested: Yes   Admission Nurse/Intake Notified:   Planned Start of Care Date: 9/29/2022  Hospice Witness Representative: MSG Mona    Name  [facility]   [phone]     Family information packet provided & reviewed with pt. Thank you for the opportunity to be of service to Mrs. Gurrola and her family.      Maura Howe [facility], [address] [place] [address]  [phone]

## 2022-09-29 NOTE — HOSPICE
[facility]  [phone]    Hospice Nursing PRE-Admission   Discharge Summary  Patient Name: Renny Matt  YOB: 1960  Age: 63 y.o. Date of PLANNED Hospice Admission: 2022  Hospice Attending: Dr. Ankita Haq  Primary Care Physician: Dr. Juju Rodriguez Address:  [address] [place] [address]    Primary Contact and Phone:  [phone] (Home) *Preferred*   Spouse: Jerica Marrero: [phone]    ADVANCE CARE PLANNING    Code Status:   Durable DNR: []  Yes  []  No  Advance Care Planning 10/27/2019   Confirm Advance Directive Yes, not on file       Lutheran: NO PREFERENCE   Home: TBD    HOSPICE SUMMARY     Verbal CTI of terminal diagnosis with life expectancy of 6 months or less received from: Dr. Ankita Haq    For the Hospice Diagnosis of: Metastatic Breast CA    NCD: Requested/Declined      CLINICAL INFORMATION   Allergies: Allergies   Allergen Reactions    Codeine Nausea and Vomiting    Erythromycin Nausea and Vomiting         Currently this patient has:  [] Supplemental O2 [] Peripheral IV  [] PICC    [x] PORT   [] Peraza Catheter [] NG Tube   [] PEG Tube [x] Ostomy bag over paracentesis drain site    [] AICD: Has ICD been deactivated? [] Yes [] Wounds      COVID Screening: Patient's  tested Positive         ASSESSMENT & PLAN     1. Symptom Issues Identified: Patient denies pain or shortness of breath. Complains of intermittent nausea and extreme fatigue. No appetite. Very weak; not ambulating without Max assist. Patient is on Neutropenic Precautions. Educated  to continue to wear N-95 style mask for 5 more days; limit visitors in the home in best attempt to isolate patient. 2. Spiritual Issues  Identified: None    3. Psych/ Social/ Emotional Issues Identified: Pt lives with her , Janeth Fregoso. Pt has large family willing to assist with home care.  Pt has been surviving breast cancer for over 12 years.            CARE COORDINATION           Hospice Consents: And DDNR completed via DocuSign. Patient is alert and oriented. She is very weak, and requests that her  sign all Consent forms. Pt home with  COVID. 2. DME Ordered/Company/Delivery Plan: Pt has in the home: WC and BSC  Thank you for your order [id number]. It is scheduled to be Delivered by Thu Sep 29 03:00 PM - 07:00 PM EST. 1. OVER THE BED TABLE USED W/FRAME   2. 5L CONCENTRATOR    3. OXYGEN (O2) E TANK    4. E-TANKS GAS BACK-UP   5. ECART  6. STANDARD REGULATOR  7. O2 KIT Sale     8. FULL 3/4 LENGTH RAIL & BED #[id number]    9. RC GEL WC CUSHION    3. Unique home needs for safety: Bariatric patient  NO    4. Symptom Kit and other Medications Needs: Symptom Kit with Dilaudid ordered from [facility] to send home with family today. Included: Decadron 4mg tabs (can be crushed and dissolved) & Zofran ODT. Confirmation #: [id number]    9. Home Admission Reservation: Friday 9/30/2022 11:00 am    6. Transportation by: [facility]    Scheduled for: 10:00         7. Verbal Report/Handoff given to: Hospice Home Team    8. Phone number to MARIBEL ALLEN BIRD: [phone]    9. Supplies/Wound Care: Starter Kit and supplies for drain to go home with patient. Wound care to visit with patient in the am for assessment.     Anton Patterson RN, [place] [address] Nurse Liaison  [phone] Mobile  [phone] Office  Available on [employer]

## 2022-09-29 NOTE — HOSPICE
[address] [facility] to Those in Need  [phone]    Patient Name: Jack Persaud  YOB: 1960  Age: 63 y.o. [facility] MERYLTL RN Note:  Hospice consult noted. Chart reviewed. Plan of care discussed with patients nurse & care manager. In to meet with patient and her sister, Rishabh Barber Pt , Sergio Sifuentes is able to participate in conversation via phone. WAN VillavicencioW also bedside. Discussed Hospice philosophy, general plan of care, levels of care, services and on call procedures. Family information packet provided & reviewed with family. Patient is very weak and pale. She denies symptoms of pain or shortness of breath. Complains of intermittent nausea and fatigue. Patient and her family are in agreement with Hospice philosophy and request to admit into hospice services once discharged home. [facility] is holding a home admission for Friday, 9/30/2022 at 11:00 am. Plan to request transportation from [facility] to home be arranged by [facility] CM around 10:00 am.    Reviewed with Dr. Vipul Mcgowan for CTI: Hospice diagnosis of Metastatic Breast Cancer. Also, reviewed with Dr. Vipul Mcgowan patient's need for replacing paracentesis drain that was dislodged during this hospital stay. Dr. Vipul Mcgowan agrees that replacing drain would be in patient's best interest prior to discharge home. Patient and family in agreement with this plan. Dr. Vipul Mcgowan to contact medical team to review plan. Will need to complete DNR. Thank you for the opportunity to be of service to this patient. 12:50: Attempting to reach Dr. Joseph Domingo and/or Dr. Luis Cheek via Perfect Serve and phone messages to review Hospice plan of care, and re-insertion of paracentesis drain. 14:45: Order entered from Dr. Vipul Mcgowan for replacement of Paracentesis tunneled cath/drain STAT. IR confirmed order received, however, unable to do procedure today.  IR unable to commit to procedure on Friday, but will attempt to have drain placed Friday. Offered these options to patient's  Ofe Schumacher over the phone. Ofe Schumacher states that he would prefer patient come home Friday, and not wait over weekend for drain placement. Bedside with patient and her sister and son Lu Martin. Patient in agreement; she would like to have drain placed, however, if Friday morning, IR can not do procedure, she said she wants to discharge home. Hospice will monitor patient for symptoms that may require return to hospital to have drain placed. Family in agreement. 15:15: Spoke with Dr. Tirso Hartmann nurse. Dr. Purnima Singh is covering for Dr. Eugene Mendoza. Reviewed patient has completed a DDNR and request for CODE status change begin now. Offered Hospice plan of care to discharge 9/30, and admit into Hospice services tomorrow once she is home. Reviewed with Dr. Luke Rush.     Catherine Hutchinson RN, [place] [address] Nurse Liaison  [phone] Mobile  [phone] Office  Available on Perfect Serve

## 2022-09-29 NOTE — PROGRESS NOTES
Problem: Falls - Risk of  Goal: *Absence of Falls  Description: Document Gege Embs Fall Risk and appropriate interventions in the flowsheet. Outcome: Progressing Towards Goal  Note: Fall Risk Interventions:  Mobility Interventions: Communicate number of staff needed for ambulation/transfer, OT consult for ADLs, Patient to call before getting OOB, PT Consult for mobility concerns, PT Consult for assist device competence, Strengthening exercises (ROM-active/passive), Utilize walker, cane, or other assistive device    Mentation Interventions:  Toileting rounds, Update white board    Medication Interventions: Evaluate medications/consider consulting pharmacy, Patient to call before getting OOB, Teach patient to arise slowly    Elimination Interventions: Call light in reach, Patient to call for help with toileting needs, Toileting schedule/hourly rounds    History of Falls Interventions: Consult care management for discharge planning, Door open when patient unattended, Evaluate medications/consider consulting pharmacy, Investigate reason for fall         Problem: Patient Education: Go to Patient Education Activity  Goal: Patient/Family Education  Outcome: Progressing Towards Goal     Problem: Patient Education: Go to Patient Education Activity  Goal: Patient/Family Education  Outcome: Progressing Towards Goal     Problem: Pain  Goal: *Control of Pain  Outcome: Progressing Towards Goal  Goal: *PALLIATIVE CARE:  Alleviation of Pain  Outcome: Progressing Towards Goal     Problem: Patient Education: Go to Patient Education Activity  Goal: Patient/Family Education  Outcome: Progressing Towards Goal     Problem: General Medical Care Plan  Goal: *Vital signs within specified parameters  Outcome: Progressing Towards Goal  Goal: *Labs within defined limits  Outcome: Progressing Towards Goal  Goal: *Absence of infection signs and symptoms  Outcome: Progressing Towards Goal  Goal: *Optimal pain control at patient's stated goal  Outcome: Progressing Towards Goal  Goal: *Skin integrity maintained  Outcome: Progressing Towards Goal  Goal: *Fluid volume balance  Outcome: Progressing Towards Goal  Goal: *Optimize nutritional status  Outcome: Progressing Towards Goal  Goal: *Anxiety reduced or absent  Outcome: Progressing Towards Goal  Goal: *Progressive mobility and function (eg: ADL's)  Outcome: Progressing Towards Goal     Problem: Patient Education: Go to Patient Education Activity  Goal: Patient/Family Education  Outcome: Progressing Towards Goal     Problem: Patient Education: Go to Patient Education Activity  Goal: Patient/Family Education  Outcome: Progressing Towards Goal     Problem: Patient Education: Go to Patient Education Activity  Goal: Patient/Family Education  Outcome: Progressing Towards Goal     Problem: Patient Education: Go to Patient Education Activity  Goal: Patient/Family Education  Outcome: Progressing Towards Goal     Problem: Pressure Injury - Risk of  Goal: *Prevention of pressure injury  Description: Document Bernabe Scale and appropriate interventions in the flowsheet.   Outcome: Progressing Towards Goal  Note: Pressure Injury Interventions:  Sensory Interventions: Assess changes in LOC, Keep linens dry and wrinkle-free, Maintain/enhance activity level, Minimize linen layers, Pressure redistribution bed/mattress (bed type)    Moisture Interventions: Absorbent underpads, Apply protective barrier, creams and emollients, Check for incontinence Q2 hours and as needed, Minimize layers, Maintain skin hydration (lotion/cream), Moisture barrier, Offer toileting Q_hr    Activity Interventions: Increase time out of bed, Pressure redistribution bed/mattress(bed type), PT/OT evaluation    Mobility Interventions: HOB 30 degrees or less, Pressure redistribution bed/mattress (bed type), PT/OT evaluation    Nutrition Interventions: Document food/fluid/supplement intake, Offer support with meals,snacks and hydration, Discuss nutritional consult with provider    Friction and Shear Interventions: Apply protective barrier, creams and emollients, HOB 30 degrees or less, Lift sheet, Minimize layers                Problem: Patient Education: Go to Patient Education Activity  Goal: Patient/Family Education  Outcome: Progressing Towards Goal     Problem: Risk for Spread of Infection  Goal: Prevent transmission of infectious organism to others  Description: Prevent the transmission of infectious organisms to other patients, staff members, and visitors.   Outcome: Progressing Towards Goal     Problem: Patient Education:  Go to Education Activity  Goal: Patient/Family Education  Outcome: Progressing Towards Goal

## 2022-09-30 NOTE — WOUND CARE
Patient seen for paracentesis site that is draining. Ostomy appliance in place was changed. Sister at bedside observed. A little difficult to apply as almost a constant drainage of fluid. Four additional bags given to patient as well as graham back and two connections. Educated patient on turning from time to time to prevent pressure injuries and keeping heels offloaded. Patient discharged home on Hospice.     Reba Burton \"Lizeth\" KELLI Huerta, RN, [facility]  Office x [phone]  Fasted way to contact me is [address] [place] [address]

## 2022-09-30 NOTE — PROGRESS NOTES
Physical Therapy    Chart reviewed. Note plan for pt d/c home with hospice services. Will sign off at this time.     David Ogden, PT,MPT

## 2022-09-30 NOTE — PROGRESS NOTES
Physician Progress Note      Rajendra Lopez  CSN #:                  [id number]  :                       1960  ADMIT DATE:       9/15/2022 3:19 PM  [address] DATE:  RESPONDING  PROVIDER #:        Alexy Mckinnon MD          QUERY TEXT:    Patient admitted with liver mets . If possible, please document in progress notes and discharge summary if you are evaluating and /or treating any of the following: The medical record reflects the following:  Risk Factors: mets  Clinical Indicators:   nutritional consult  Malnutrition Assessment:  Malnutrition Status:  Severe malnutrition (22 1502)  Context:  Chronic illness  Findings of the 6 clinical characteristics of malnutrition:  Energy Intake:  75% or less est energy requirements for 1 month or longer  Weight Loss:  Greater than 5% over 1 month  Body Fat Loss:  Mild body fat loss, Orbital, Buccal region  Muscle Mass Loss:  Severe muscle mass loss, Clavicles (pectoralis &deltoids), Temples (temporalis), Hand (interosseous)  Fluid Accumulation:  Severe, Ascites   Strength:  Not performed    Treatment: Recommendation if needed: Nocturnal Jevity 1.5 @ 100 ml/hr x 12 hours (8p-8a) + 60 ml water flush q4h. This will provide 1800 kcals (100% of needs), 77 g pro (92% of needs), 1152 ml free water (TF+flush), 259 g CHO. This recommendation will work for NGT or PEG. Nutrition Interventions:  Food and/or Nutrient Delivery: Continue current diet, Start oral nutrition supplement, Start tube feeding    Thank you,  Maricarmen Osorio RN, CDI, CRCR, CCDS  Certified Clinical Documentation Integrity  Specialist  [phone]  You can also contact me via [facility]. ASPEN Criteria:  https://aspenjournals. onlinelibrary. butt. com/doi/full/10.1177/5151701191752996  Options provided:  -- Protein calorie malnutrition severe  -- Protein energy  malnutrition severe  -- Other - I will add my own diagnosis  -- Disagree - Not applicable / Not valid  -- Disagree - Clinically unable to determine / Unknown  -- Refer to Clinical Documentation Reviewer    PROVIDER RESPONSE TEXT:    This patient has severe protein calorie malnutrition.     Query created by: David Sung on 9/23/2022 8:16 AM      Electronically signed by:  Bren Stark MD 9/30/2022 10:08 AM

## 2022-09-30 NOTE — HOSPICE
[facility]  [phone]    Hospice Liaison Nursing Note   Patient Name: Penny Hernandez  YOB: 1960  Age: 63 y.o. Date of Visit: 09/30/22  [facility]  Patient Room: 623/01      Chart reviewed for updates in plan of care. Phone call to patient's , Michael Cheatham. Confirmed DME arrived, confirmed The alejandra, currently with COVID+ infection, is able to care for wife. Reviewed safety and isolation, including patient Nerutropenic precautions. The alejandra states he understands to wear N-95 around patient for the next week. Andrade Leung states family members will also wear N- 95 masks. Visit Vitals  /70   Pulse 70   Temp 98.5 °F (36.9 °C)   Resp 16   Ht 5' 3\" (1.6 m)   Wt 56.2 kg (123 lb 14.4 oz)   SpO2 99%   BMI 21.95 kg/m²      Bedside with patient and her sister, Vivian. Patient alert, oriented with intermittent confusion to details. Wound care nurse bedside providing wound care and education for family. Pt appears stable to transport home. Plan: Pt to discharge today around 10:00am with ambulance transport. Hospice Services to begin today once patient arrives home around 11:00am    Please call Hospice team to offer support for patient, family or staff. Thank you for your coordination with the hospice plan of care    09:55: Verbal order received for patient discharge from Dr. Monson Seek nurse, Luis Carlos Sotelo RN. Pt to discharge home with hospice.       Jane Ojeda RN, [place] [address] Nurse Liaison  [phone] Mobile  [phone] Office

## 2022-09-30 NOTE — PROGRESS NOTES
DARIUS: d/c home with [facility]; Hospice Agency added to AVS;  Spouse intends to hire private pay caregivers, list provided     Pt contacted Willian Kendall of [employer] to inform of plan for transition to hospice and that pt will not need DME items as they will be covered under hospice benefit     Spouse currently has a BSC & w/c for pt & has also recently started renting a hospital bed through [employer]     Spouse is Covid positive, tested on 9/25, he is asymptomatic and is fully vaccinated and boosted  As per spouse's report, pt is also fully vaccinated and boosted for Covid     [facility] Transport scheduled for 10am with [facility]     Primary family contact: Sonja Torrez [phone]     RUR: 18%  -Metastatic breast cancer with progressive liver mets  -Pt is on neutropenic precautions  -Nutrition consulted, poor appeite  -Pt/Ot following, min/moderate assistance, pt's spouse has Covid  -s/p parcentesis with ostomy bag draining  *-0900-CM reviewed pt chart & verified with hospice nurse, Moises Coffman, that we are on track for 11am hospice admission. CM also contacted [facility] and confirm 10am transport.  CM spoke with pt's spouse, Stefani Mack, and he has all needed DME at the home and is expecting pt's arrival.   Patricia Spring RN BSN CCM

## 2022-09-30 NOTE — PROGRESS NOTES
Hematology-Oncology Progress Note    Penny Oms  1960  [id number]  9/30/2022    Follow-up for: metastatic breast cancer     [x]        Chart notes since last visit reviewed   [x]        Medications reviewed for allergies and interactions       Case discussed with the following:         []                            [x]        Nursing Staff esperanza                                                                        []        Pathologist                                                                        []        FAMILY       Subjective:     Pt denies pain, is very weak;   Wants to go home    Objective:   Patient Vitals for the past 24 hrs:   BP Temp Pulse Resp SpO2   09/30/22 0817 117/70 98.5 °F (36.9 °C) 70 16 99 %   09/30/22 0154 121/76 98.4 °F (36.9 °C) 81 15 95 %   09/29/22 1929 123/75 98.5 °F (36.9 °C) 78 16 95 %   09/29/22 1505 118/72 98.7 °F (37.1 °C) 78 16 96 %         REVIEW OF SYSTEMS:    Constitutional: negative fever, negative chills, negative weight loss  Eyes:   negative visual changes  ENT:   negative sore throat, tongue or lip swelling  Respiratory:  negative cough, negative dyspnea  Cards:  negative for chest pain, palpitations, lower extremity edema  GI:   negative for nausea, vomiting, diarrhea, and abdominal pain  Neuro:  negative for headaches, dizziness, vertigo  []                        Full ROS o/w normal/non contributor    Constitutional:  Patient looks  [x]        Sick  [x]        Frail  []        Better                                                 []        Depressed    HEENT:  []   NC                         []   AT               []    ALOPECIA           Eyes: []   Normal               [x]    Icteric  Oropharynx: []    Normal                  []  Thrush               []   Dry  Mucositis: []    None                 Grade: []        I  []        II  []        III  []        IV  Neck:   [x]   Supple                  []  Rigid               JVD:    []   ABSENT []   PRESENT  Lymphadenopathy:   []   None Noted            []   PRESENT    Chest:  [x]   Clear               []    Rhonchi                      Dec'd @     []  Right Base           []   Left Base    CV:             [x]   Regular              []  Irregular               []   Tachy                []   Murmur  Abdominal:   []    Soft              [x]   NON-tender               []   Tender    distended w ascites  BS:    []   ABSENT                   [x]   PRESENT  Liver:     []  NON-palp                  [x]   EDGE- palp  Spleen: [x]   NON-palp                   [x]  EDGE - palp   ascites. Mass:   [x]   ABSENT                          []  PRESENT  Extr:    [x]  Lymphedema             []   Cyanosis      []  Clubbing  Edema:     []   NONE       [x]   PRESENT  Skin:  Intact [x]           Purpura []        Rash: []   ABSENT       []  PRESENT  Neuro:  [x]        Normal  []        Confused      Available labs reviewed:  Labs:    Recent Results (from the past 24 hour(s))   CBC WITH AUTOMATED DIFF    Collection Time: 09/30/22  1:57 AM   Result Value Ref Range    WBC 4.0 3.6 - 11.0 K/uL    RBC 2.62 (L) 3.80 - 5.20 M/uL    HGB 8.5 (L) 11.5 - 16.0 g/dL    HCT 26.0 (L) 35.0 - 47.0 %    MCV 99.2 (H) 80.0 - 99.0 FL    MCH 32.4 26.0 - 34.0 PG    MCHC 32.7 30.0 - 36.5 g/dL    RDW 19.3 (H) 11.5 - 14.5 %    PLATELET 64 (L) 460 - 400 K/uL    MPV 10.2 8.9 - 12.9 FL    NRBC 3.5 (H) 0  WBC    ABSOLUTE NRBC 0.14 (H) 0.00 - 0.01 K/uL    NEUTROPHILS 56 32 - 75 %    BAND NEUTROPHILS 9 (H) 0 - 6 %    LYMPHOCYTES 23 12 - 49 %    MONOCYTES 8 5 - 13 %    EOSINOPHILS 0 0 - 7 %    BASOPHILS 0 0 - 1 %    METAMYELOCYTES 3 (H) 0 %    MYELOCYTES 1 (H) 0 %    IMMATURE GRANULOCYTES 0 %    ABS. NEUTROPHILS 2.6 1.8 - 8.0 K/UL    ABS. LYMPHOCYTES 0.9 0.8 - 3.5 K/UL    ABS. MONOCYTES 0.3 0.0 - 1.0 K/UL    ABS. EOSINOPHILS 0.0 0.0 - 0.4 K/UL    ABS. BASOPHILS 0.0 0.0 - 0.1 K/UL    ABS. IMM.  GRANS. 0.0 K/UL    DF MANUAL      RBC COMMENTS MACROCYTOSIS  1+        RBC COMMENTS ANISOCYTOSIS  1+        RBC COMMENTS NRBC,PST    METABOLIC PANEL, COMPREHENSIVE    Collection Time: 09/30/22  1:57 AM   Result Value Ref Range    Sodium 133 (L) 136 - 145 mmol/L    Potassium 4.0 3.5 - 5.1 mmol/L    Chloride 103 97 - 108 mmol/L    CO2 23 21 - 32 mmol/L    Anion gap 7 5 - 15 mmol/L    Glucose 204 (H) 65 - 100 mg/dL    BUN 9 6 - 20 MG/DL    Creatinine 0.43 (L) 0.55 - 1.02 MG/DL    BUN/Creatinine ratio 21 (H) 12 - 20      GFR est AA >60 >60 ml/min/1.73m2    GFR est non-AA >60 >60 ml/min/1.73m2    Calcium 6.5 (L) 8.5 - 10.1 MG/DL    Bilirubin, total 2.9 (H) 0.2 - 1.0 MG/DL    ALT (SGPT) 83 (H) 12 - 78 U/L    AST (SGOT) 128 (H) 15 - 37 U/L    Alk. phosphatase 538 (H) 45 - 117 U/L    Protein, total 4.4 (L) 6.4 - 8.2 g/dL    Albumin 1.5 (L) 3.5 - 5.0 g/dL    Globulin 2.9 2.0 - 4.0 g/dL    A-G Ratio 0.5 (L) 1.1 - 2.2     PHOSPHORUS    Collection Time: 09/30/22  1:57 AM   Result Value Ref Range    Phosphorus 1.3 (L) 2.6 - 4.7 MG/DL       Available Xrays reviewed:    Chemotherapy monitored and toxicities assessed:    Assessment and Plan   Metastatic breast cancer with progressive liver mets. .  she has been through all available hormornal therapies as well as xeloda, and picqray. Sunday 9/18 received cycle I carbo/Taxol with full dose carbo and 1/2 dose of her weekly Taxol. Day 7 was due on 9/23 but we are holding day 8  due to neutropenia and thrombocytopenia  \"She has pancytopenia due to chemotherapy\"    Hypotension. Resolved; continue 75 cc per hour N  Profound anorexia. Megace added. She is on prophylactic enoxaparin. Nutrition consult. She needs popsickles, soups, and good-tasting supplements. Anemia. .hgb is 8.2  plts 62k ; stool heme pos. Ascites. 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 Ostomy bag in place  5. Debility- PT/OT consult  unable to participate  6. Nausea is better  7. Hypocalcemia severe  8.    Liver failure. Alk phos up to 489; T bili was 6.0, down to 3.0 after chemotherapy.   9 severe protein calori malnutrition/   pt is going home with hospice no intervention    Pt wants to go home   hospice is arranged for ,  will proceed with discharge    Kathleen Ibrahim MD

## 2022-09-30 NOTE — HOSPICE
[facility]: Metastatic Breast Cancer  Diagnoses RELATED to the terminal prognosis: Liver and bone metastases, Protein Calorie Malnutrition  UNRELATED Diagnoses: Thyroid disease  Date of Hospice Admission: 2022  Hospice Attending Elected by Patient: Dr. Arya Norman MD  PCP: Non on file. Referral Dr. Merlinda Pickett MD  Admitting RN: Jl Deutsch, MSN RN  : Lanre Davila, RN  : Melinda Altman  : Silke Sal DNR: Yes   Home: Not discussed    ADMISSION ASSESSMENT/ DIRECT OBSERVATION:   Rebecca Rincon was pale, chronically ill appearing, she was pleasant and cooperative with a brief assessment as she was exhausted and wanted to sleep. Noted for icteric sclera, bilateral heels erythematous rash, which per patient was non pressure related, it was a reaction to a chemotherapy medication. Sacral area intact, unable to recall LBM, she was not uncomfortable and certainly did not want further intervention. Abdomen, LLQ urostomy bag to site of previous paracentesis drain. Draining copious amounts of serous colored ascitic fluid. , Juan R Rodriguez educated and witnessed to successfully connect a [place] drainage bag to the urostomy bag, he was competent with drainage of the bag. Confirmed with Dr. Chestine Apley to leave paracentesis site on continuous drainage. Hypotension persists without physical symptoms. Rebecca Rincon is essentially bed bound, and unable to bear her own weight. SOCIAL:   Lives at home with  Kimberly Koroma, they have 2 adult sons, Suly Celaya 26yrs was present at the admission with his gf, Dolly Chataignier, they live in [address] [place] [address]. Lorrin Mortimer is 30yrs and lives in [place], he is arriving tonight to visit, per Juan R Rodriguez he and his mother are very close. He has tried to prepare them both as best he can. Jami's sister, Dorian Dwyer was also present at the admission.     HOSPITAL COURSE LEADING TO ADMISSION TO HOSPICE:     ED Note: 2022 Dr. Morenita Fajardo   60-year-old female with a past medical history of metastatic breast cancer to the liver and bones who presents to the emergency department due to nausea and vomiting as well as generalized weakness. Patient developed symptoms of nausea and vomiting last weekend. Due to the symptoms, she contacted her oncologist and went to the infusion center on Tuesday where she got IV fluids, steroids, and IV antiemetics. She felt better that day and then then by the next day her symptoms returned. She has not been eating or drinking much and has been lying in bed for most of the day. She says she feels very weak. Patient denies any abdominal pain or diarrhea. There has been no blood in her stool. No headaches. She had a recent MRI of her brain which did not show any metastatic lesions. Her  also states that she recently had a CT scan of her liver that showed worsening metastatic disease. She has since been taken off her oral chemotherapy as it was not working and there were discussions with the patient's oncologist about starting a new chemotherapy medication. No fevers or chills. No blood in her vomit. No chest pain or shortness of breath. She was discharged home. - Returned 9/15/22 and Admitted. Inpatient Note 9/15/2022 Dr. Mello Betancourt  Metastatic breast cancer with progressive liver metasteses, she has been through all available hormornal therapies as well as xeloda, and picqray. Sunday 9/18 received cycle I carbo/Taxol with full dose carbo and 1/2 dose of her weekly Taxol. Day 7 was due on 9/23 but we are holding day 8 due to neutropenia and thrombocytopenia. She has pancytopenia due to chemotherapy. Hypotension, profound anorexia. Megace added. She is on prophylactic enoxaparin. Nutrition consult. Anemia, hgb is 8.2 plts 62k ; stool heme positive. 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  Ostomy bag in place  Debility- PT/OT consult unable to participate  Nausea is better  Hypocalcemia severe  Liver failure. Alk phos up to 489; T bili was 6.0, down to 3.0 after chemotherapy. Pt wants to go home as soon as she can and has elected Hospice Care    LCD Guidelines:   Cancer Diagnoses   A. Disease with distant metastases at presentation OR  B. Progression from an earlier stage of disease to metastatic disease with either:  1. a continued decline in spite of therapy  2. patient declines further disease directed therapy    Dr. Kirsten Henderson MD agrees to serve as the Attending provider for Hospice and provided verbal certification of terminal illness with prognosis of 6 months or less life expectancy. Orders for hospice admission, medications and plan of treatment received.      Medication reconciliation completed  - comfort medications available  DME: [facility]  Supplies: ordered  IDT communication to include MD, SN, SW, CH and support team.

## 2022-10-01 NOTE — HOSPICE
Patient laying in bed, she denies pain and N/V. Patient is complaining of fatigue. Family request half rails, writer called [employer] to delivered on Monday. Patient  request some thing for pain that not as strong as Dilaudid. Writer will call MD and call him after visit. Writer reviewed drain care, medications and care plan. Writer ask family to call with any questions or concerns. Writer called Bee Healy NP order for Tylenol 500 mg every 6 hours as needed, use sparingly due to liver mets. Writer called patient's son and explained order. He stated understanding.

## 2022-10-10 NOTE — HOSPICE
Visiting jointly today with Dr. Irvine Gilford to assess peritoneal drain site to see if it can be reopened to drain abdominal fluid. Received patient resting in bed, awake and alert and oriented x3-4. Patient reports no pain at this time. Observed shallow breathing and abdominal distention. Patient also reports no bowel movement in 4-5 days. Order received from Dr. Irvine Gilford for Senna 8.6 mg tablets twice daily for constipation. Patient also reports that she is still not eating and only taking fluids as tolerated. Dr. Irvine Gilford assessed the drain site and it has not drained any fluid in 4 days. Discussed options with patient and spouse Haylee Welch of attempting to open drain at bedside versus full drain placement procedure in the hospital. Family prefer to try palliative home measures to drain abdminal fluid. Lidocaine injected to site by Dr. Irvine Gilford to numb the area. Needle inserted and fluid was able to be withdrawn with syringe. 30 ml syringe placed and with multiple fillings, patient was drained of a total of 1100 cc yellow peritoneal fluid. At end of procedure opening was still draining fluid. Clean ostomy bag placed over site and graham bag left for spouse to use to drain bag as needed. Dr. Irvine Gilford taught patient and spouse to watch for signs of infections such as redness, pain to abdominal area. Instructed to call hospice for any of these symptoms. Janine Clark both verbalize understanding and agree. Patient tolerated procedure well and expressed feeling better, less abdominal pressure after fluid removed. After visit, Haylee Welch reported to [facility] that patient has a \"goopy\" left eye and redness to her conjunctiva. Order received from Jean Pierre Chin NP for erythromycin ointment for pink eye. MAR updated and medication ordere via [facility] for delivery. Patient has a known oral erythromycin allergy and NP aware, stating ointment it topical and should not cause any issues. No supplies needed at this time.  Encouraged Haylee Welch to call hospice 24/7 with any needs or change in patient's condition. Yaakov Gonzales agrees.

## 2022-10-11 NOTE — DISCHARGE SUMMARY
Marcus Kaur 2906 SUMMARY    Name:  Chuy Singer  MR#:  [id number]  :  1960  ACCOUNT #:  [de-identified]  ADMIT DATE:  09/15/2022  DISCHARGE DATE:  2022    HISTORY:  The patient was admitted on 09/15 by my partner, Dr. Kayode Vásquez, for further evaluation and treatment of her metastatic breast cancer. This patient Is heavily pretreated after having been diagnosed in 2018 with metastatic disease. She had been on hormonal therapy alone up until the diagnosis of rapidly progressive liver mets and was admitted for her first planned cycle of chemotherapy. For details of the H&P, please see the history and physical done by Dr. Anna Ronquillo. HOSPITAL COURSE:  Upon admission the patient was found to have an elevated bilirubin. She had ascites as well. She was nevertheless premedicated and treated with her first dose of Taxol and carboplatin at reduced doses. She appeared to tolerate the chemotherapy well without any significant increase in nausea. She did not develop mucositis. She had persistent fatigue. She had slight improvement in her bilirubin over the next several days, her blood counts did deteriorate. We had hoped to give her day 8 chemotherapy, but her pancytopenia worsened and it was felt to be not safe to go ahead and pursue day 8. She had profound protein-calorie malnutrition during this admission and was being considered for tube feedings, but this ultimately was held. She had paracentesis performed on two occasions with symptomatic relief. She had hypocalcemia which was addressed with supplements. She had hypotension during this admission which was addressed with IV fluids. It became apparent that she was unable to get her second dose of chemotherapy until discussion with Dr. Anna Ronquillo on  and he recommended hospice. She was seen by Hospice on  and was discharged on  with hospice followup. She had no discharge activity or dietary restrictions. She will be sent home on her Unithroid 75 mcg daily, Diflucan 100 mg daily x7, Prilosec 20 mg daily, Desyrel 50 mg, Pepcid 10 mg daily, Zofringe malloy.       Soheila Wadsworth MD      [id number]  D:  10/10/2022 9:29  T:  10/11/2022 6:04  JOB #:  [id number]

## 2022-10-11 NOTE — HOSPICE
Routine nursing visit performed. Patient in bed. Spouse and friend present. Patient denied pain and sob. Some nausea earlier, but not at present. Spouse states patient has secretions that she cannot cough up, and is giving her hyoscyamine. Both spouse and patient report it is effective. Patient does report she has a sore throat and a cough, no fever present. This nurse assessed patient's mouth. There is a small white coating on back of tongue. Suggested she take the Magic Mouthwash again, but she declined. Patient is not taking any of her medications at this time. She states it all takes terrible and causes nausea. This nurse suggested taking the ondansetron before her medications to help with nausea. She states the ondansetron tastes terrible too. She doesn't eat r/t nausea and taste. Assessed the drainage bag at LLQ of abdomen. Approximately 2 mls of serous liquid. Spouse reports her abdomen looks bigger. This nurse measured abdomen for baseline= 83cm. Patient's hands 1+ edema bilaterally. Spouse also states patient has not voided her urine in 2 days, but had a mucous- like discharge this morning. Patient has lack of interest in any type of medication or treatment, and appears resigned to dying. Updated Dr. Isabel Morillo with today's visit and issues.

## 2022-10-12 PROBLEM — Z51.5 HOSPICE CARE: Status: ACTIVE | Noted: 2022-01-01

## 2022-10-12 NOTE — HOSPICE
Patient is a 79-year-old  female who was admitted to hospice services with Primary Dx: Metastatic breast cancer. MSW conducted virtual initial assessment with pt  Brett Whaley. Patient primary caregiver is . The patient has 2 sons, Max He 26yrs live in [place] [facility] [address] is 30yrs lives in [place] and has made a visit since pts hospice admission. Pt has a supportive sibling, Vivian. MSW learned pt has not been eating or drinking much and lying in bed for most of the day. The pts  stated things are going well since bringing hospice on and that the family is appreciative of services.  stated not needing much support from social work but open to [employer]. MSW asked if patient need additional caregiver support.  stated wanted to look into FMLA continuous leave for 12 weeks. MSW offered to assist the  with the completion of FMLA paperwork. The  was provided email so that necessary forms can be forwarded. MSW provided education to other resources and support SW can provide while on hospice. Discussed Hospice philosophy, general plan of care, levels of care, services and on call procedures. Pt is a DDNR.  Home: TBD. MSW will continue to follow for patient and family to provide emotional support.

## 2022-10-14 NOTE — HOSPICE
Spouse Delia Bullard called office to state pt is having problems voiding. On arrival, pt states she often feels she needs to urinate but does not fully empty her bladder. Pt in bed, no signs of acute distress, PPS 30. Inserted 16 Citizen of [place] silicone catheter with 10ml balloon. Pt tolerated well. Immediate return of 1000cc deandre urine. Pt feeling relief. Instructed spouse on how to empty the bed. Peraza secured with leg strap device. Delia Bullard states they have what they need for the weekend. Encouraged to call with any needs.

## 2022-10-18 NOTE — HOSPICE
Performed 0-7 nursing visit. Patient is well palliated. patient lethargic but remains oriented and able to answer questions. No needs at this time. Spouse Radha Altamirano asked questions about what to do when patient passes. Educated him to call [facility] and nurse will come to pronounce and call Clinton Molly for him. Radha Altamirano grateful that [facility] has been supportive and informative regarding the process.

## 2022-10-18 NOTE — HOSPICE
Visiting jointly today with Dr. Alejandra Alarcon to assess need for paracentesis. Patient sleepy but rousable with verbal stimuli. Remains oriented to self, place and situation. Complexion today is jaundiced. Patient reports skin itching. Dr. Alejandra Alarcon educated that it could be from liver dysfunction, medications, or skin stretching from ascites. RNCM unable to get a blood pressure reading after trying 3 times. Dr. Alejandra Alarcon estimates that it is less than 70 systolic. Discussed risks of performing paracentesis due to blood pressure not tolerating procedure. It was decided by all that since the patient is comfortable, we will hold off on procedure and treat symptoms with medications. Peraza catheter draining moderate amount of tea colored urine without difficulty. Patient placed on daily visits secondary to decline, EOL discussed with spouse Emelie Essex. Spouse verbalizes understanding and is sad, but coping well. No supplies requested at this time. Refill: Dilaudid 4 m ml ordered via [facility] for delivery tomorrow. Confirmation [id number]. Encouraged spouse to call hospice / with any needs or change in patient condition. Emelie Essex agrees.

## 2022-10-19 NOTE — HOSPICE
Received patient resting in bed with eyes closed. Responds to verbal stimuli and will answer questions appropriately. Appears well palliated with no complaints of pain or shortness of breath at this time. Drain site to left flank is closed with no signs of infection. Peraza continues draining tea colored urine with sediment without difficulty. Bruising noted to patient's right neck area secondary to liver dysfunction. Hands and feet cool to touch. Pedal pulses are decreased in feet. No supplies or refills requested at this time. Offered  visit to spouse Marquis Ndiaye who was open to idea. After Marquis Ndiaye talked with Ricki Jj she declined the visit. Encouraged Marquis Ndiaye to call hospice 24/7 with any needs or change in patient's condition. Marquis Ndiaye agrees.

## 2022-10-21 NOTE — HOSPICE
0-7 visit. On arrival patient laying in bed unresponsive to tactile. Patient does not appear to be in pain. Caregiver Meche Castrejon present during visit. Meche Castrejon stated dilaudid was given about 0900. Meche Castrejon reported some congestion with weak cough prior to visit. Patient unable to clear secretions. Discussed EOL symptoms. Instructed ways to help with secretions such as turning patient to side and use of Levsin. Meche Castrejon hesitant to use dissolving pills. As ordered per Darius Nunn NP, added Levsin liquid to medication record. Profiled by roman and will be delivered by [facility]. On assessment graham patent and  draining cloudy, concentrated tea-colored urine. Patient has decreased urine output. Bp unatainable during vitals. All questions answered. Caregiver agrees to call [phone] with any questions or concerns.

## 2022-10-22 NOTE — HOSPICE
0-7 days RN visit. Patient resting in bed with eyes closed. Patient non-verbal except for occasional moaning. No response to verbal or tactile stimulation. Patient's eyes remained closed throughout visit. , Elana Gruber, at bedside and reports his children and patient's sisters are here to spend time with patient. Patient with shallow breathing, breaths 20-24 per minute. Spouse reports he observed patient with periods of apnea yesterday. No respiratory distress noted during visit. Signs and symptoms of respiratory distress and use of O2 and PRN PO liquid Dilaudid for SOB was reviewed with Mr. Annette Parra. RN also reviewed non-verbal signs and symptoms of pain or discomfort and administration of PRN PO hydromorphone for pain as patient presented with occasional moaning throughout SN visit. Spouse reports patient without any solids >6 weeks and NPO except medication for > 1 week. Spouse reports patient with < 100 ml of urine output in the last 24 hours. He voiced concerns about patient experiencing dry lips and mouth but unable to tolerate liquids AEB by occasional choking and coughing with liquid medications. RN recommended administering medications to inside of cheek and use of moist oral swabs to moisten mouth and lips. Instructions of use provided to Mr. Gurrola and he verbalized understanding. Mr. Annette Parra with concerns about administration of PO liquid Levsin and questions whether he is giving right amount of medication due to pharmacy label written order states to give 1 drop of medication every 4 hours. RN reviewed order in [facility] and proper dose is 1 dropper full or 1 ml (0.125 mg) every 4 hours for oral secretions. Correct orders were reviewed with Mr. Annette Parra. Mr. Annette Parra requested to defer full skin assessment for patient comfort. Edema noted to left hand and from calves to feet bilaterally.     RN reviewed [facility] medications and symptoms with Mr. Annette Parra to include administration and appropriate doses of PO liquid Levsin for oral secretions, liquid Haloperidol for agitation and/or nausea and vomiting, and PO liquid Dilaudid for pain or SOB. Mr. Trent Sosa verbalized understanding and able to return demonstrate administration of PO liquid Levsin and PO liquid Dilaudid to patient during visit. RN addressed Mr. Gurrola's medication questions and concerns and he was appreciative of today's visit. Mr. Trent Sosa was informed he will receive a call tomorrow by a nurse for wellness check and to offer SN visit. He was advised to contact [phone] [facility] Triage with any questions, concerns or changes in patient's status.

## 2022-10-23 NOTE — HOSPICE
Patient pronounced at 10:42 PM. , Janeth Fregoso, was at bedside and reports death was peaceful.  and younger son are waiting for the older son to return from a play. Postmortem care done and Peraza catheter removed. Medication is wasted per protocol with . [facility] called to remove body. Thierry notified to remove DME. Dr. Ankita Haq notified via email. Offered support to family and waited for older son to come home to offer support to him. Janeth Fregoso and both sons are tearful and seem to be grieving appropriately. All very supportive of each other. Reminded them of our bereavement services and counseling. Encourage them to call hospice any time with any questions or concerns.

## 2022-10-24 ENCOUNTER — HOME CARE VISIT (OUTPATIENT)
Dept: HOSPICE | Facility: HOSPICE | Age: 62
End: 2022-10-24
Payer: COMMERCIAL